# Patient Record
Sex: FEMALE | Race: WHITE | NOT HISPANIC OR LATINO | Employment: OTHER | URBAN - METROPOLITAN AREA
[De-identification: names, ages, dates, MRNs, and addresses within clinical notes are randomized per-mention and may not be internally consistent; named-entity substitution may affect disease eponyms.]

---

## 2017-01-19 ENCOUNTER — GENERIC CONVERSION - ENCOUNTER (OUTPATIENT)
Dept: OTHER | Facility: OTHER | Age: 82
End: 2017-01-19

## 2017-01-27 ENCOUNTER — GENERIC CONVERSION - ENCOUNTER (OUTPATIENT)
Dept: OTHER | Facility: OTHER | Age: 82
End: 2017-01-27

## 2017-01-27 ENCOUNTER — ALLSCRIPTS OFFICE VISIT (OUTPATIENT)
Dept: OTHER | Facility: OTHER | Age: 82
End: 2017-01-27

## 2017-04-11 ENCOUNTER — ALLSCRIPTS OFFICE VISIT (OUTPATIENT)
Dept: OTHER | Facility: OTHER | Age: 82
End: 2017-04-11

## 2017-04-11 DIAGNOSIS — R05.9 COUGH: ICD-10-CM

## 2017-06-28 ENCOUNTER — ALLSCRIPTS OFFICE VISIT (OUTPATIENT)
Dept: OTHER | Facility: OTHER | Age: 82
End: 2017-06-28

## 2017-06-28 ENCOUNTER — GENERIC CONVERSION - ENCOUNTER (OUTPATIENT)
Dept: OTHER | Facility: OTHER | Age: 82
End: 2017-06-28

## 2017-06-28 DIAGNOSIS — R07.9 CHEST PAIN: ICD-10-CM

## 2017-06-28 DIAGNOSIS — I35.0 NONRHEUMATIC AORTIC VALVE STENOSIS: ICD-10-CM

## 2017-06-28 DIAGNOSIS — R06.02 SHORTNESS OF BREATH: ICD-10-CM

## 2017-06-28 DIAGNOSIS — R53.83 OTHER FATIGUE: ICD-10-CM

## 2017-06-28 DIAGNOSIS — R06.09 OTHER FORMS OF DYSPNEA: ICD-10-CM

## 2017-06-28 DIAGNOSIS — R42 DIZZINESS AND GIDDINESS: ICD-10-CM

## 2017-06-28 DIAGNOSIS — R41.3 OTHER AMNESIA: ICD-10-CM

## 2017-07-24 ENCOUNTER — ALLSCRIPTS OFFICE VISIT (OUTPATIENT)
Dept: OTHER | Facility: OTHER | Age: 82
End: 2017-07-24

## 2017-07-26 ENCOUNTER — APPOINTMENT (OUTPATIENT)
Dept: LAB | Facility: OTHER | Age: 82
End: 2017-07-26
Payer: MEDICARE

## 2017-07-26 DIAGNOSIS — R06.09 OTHER FORMS OF DYSPNEA: ICD-10-CM

## 2017-07-26 DIAGNOSIS — I35.0 NONRHEUMATIC AORTIC VALVE STENOSIS: ICD-10-CM

## 2017-07-26 DIAGNOSIS — R07.9 CHEST PAIN: ICD-10-CM

## 2017-07-26 LAB
ALBUMIN SERPL BCP-MCNC: 3.7 G/DL (ref 3.5–5)
ALP SERPL-CCNC: 113 U/L (ref 46–116)
ALT SERPL W P-5'-P-CCNC: 19 U/L (ref 12–78)
ANION GAP SERPL CALCULATED.3IONS-SCNC: 6 MMOL/L (ref 4–13)
AST SERPL W P-5'-P-CCNC: 24 U/L (ref 5–45)
BASOPHILS # BLD AUTO: 0.03 THOUSANDS/ΜL (ref 0–0.1)
BASOPHILS NFR BLD AUTO: 0 % (ref 0–1)
BILIRUB SERPL-MCNC: 0.65 MG/DL (ref 0.2–1)
BUN SERPL-MCNC: 20 MG/DL (ref 5–25)
CALCIUM SERPL-MCNC: 9.3 MG/DL (ref 8.3–10.1)
CHLORIDE SERPL-SCNC: 102 MMOL/L (ref 100–108)
CO2 SERPL-SCNC: 30 MMOL/L (ref 21–32)
CREAT SERPL-MCNC: 0.94 MG/DL (ref 0.6–1.3)
EOSINOPHIL # BLD AUTO: 0.13 THOUSAND/ΜL (ref 0–0.61)
EOSINOPHIL NFR BLD AUTO: 2 % (ref 0–6)
ERYTHROCYTE [DISTWIDTH] IN BLOOD BY AUTOMATED COUNT: 14.2 % (ref 11.6–15.1)
GFR SERPL CREATININE-BSD FRML MDRD: 51 ML/MIN/1.73SQ M
GLUCOSE P FAST SERPL-MCNC: 100 MG/DL (ref 65–99)
HCT VFR BLD AUTO: 42.4 % (ref 34.8–46.1)
HGB BLD-MCNC: 13.9 G/DL (ref 11.5–15.4)
LYMPHOCYTES # BLD AUTO: 2.76 THOUSANDS/ΜL (ref 0.6–4.47)
LYMPHOCYTES NFR BLD AUTO: 37 % (ref 14–44)
MCH RBC QN AUTO: 29.7 PG (ref 26.8–34.3)
MCHC RBC AUTO-ENTMCNC: 32.8 G/DL (ref 31.4–37.4)
MCV RBC AUTO: 91 FL (ref 82–98)
MONOCYTES # BLD AUTO: 0.7 THOUSAND/ΜL (ref 0.17–1.22)
MONOCYTES NFR BLD AUTO: 9 % (ref 4–12)
NEUTROPHILS # BLD AUTO: 3.89 THOUSANDS/ΜL (ref 1.85–7.62)
NEUTS SEG NFR BLD AUTO: 52 % (ref 43–75)
NRBC BLD AUTO-RTO: 0 /100 WBCS
PLATELET # BLD AUTO: 197 THOUSANDS/UL (ref 149–390)
PMV BLD AUTO: 12.5 FL (ref 8.9–12.7)
POTASSIUM SERPL-SCNC: 3.8 MMOL/L (ref 3.5–5.3)
PROT SERPL-MCNC: 7.5 G/DL (ref 6.4–8.2)
RBC # BLD AUTO: 4.68 MILLION/UL (ref 3.81–5.12)
SODIUM SERPL-SCNC: 138 MMOL/L (ref 136–145)
WBC # BLD AUTO: 7.53 THOUSAND/UL (ref 4.31–10.16)

## 2017-07-26 PROCEDURE — 80053 COMPREHEN METABOLIC PANEL: CPT

## 2017-07-26 PROCEDURE — 36415 COLL VENOUS BLD VENIPUNCTURE: CPT

## 2017-07-26 PROCEDURE — 85025 COMPLETE CBC W/AUTO DIFF WBC: CPT

## 2017-08-03 ENCOUNTER — GENERIC CONVERSION - ENCOUNTER (OUTPATIENT)
Dept: OTHER | Facility: OTHER | Age: 82
End: 2017-08-03

## 2017-08-03 ENCOUNTER — HOSPITAL ENCOUNTER (OUTPATIENT)
Dept: NON INVASIVE DIAGNOSTICS | Facility: CLINIC | Age: 82
Discharge: HOME | End: 2017-08-03
Payer: MEDICARE

## 2017-08-03 DIAGNOSIS — I35.0 NONRHEUMATIC AORTIC VALVE STENOSIS: ICD-10-CM

## 2017-08-03 DIAGNOSIS — R06.09 OTHER FORMS OF DYSPNEA: ICD-10-CM

## 2017-08-03 PROCEDURE — 93306 TTE W/DOPPLER COMPLETE: CPT

## 2017-08-04 ENCOUNTER — HOSPITAL ENCOUNTER (OUTPATIENT)
Dept: NON INVASIVE DIAGNOSTICS | Facility: CLINIC | Age: 82
Discharge: HOME | End: 2017-08-04
Payer: MEDICARE

## 2017-08-04 DIAGNOSIS — I35.0 NONRHEUMATIC AORTIC VALVE STENOSIS: ICD-10-CM

## 2017-08-04 DIAGNOSIS — R07.9 CHEST PAIN: ICD-10-CM

## 2017-08-04 DIAGNOSIS — R06.02 SHORTNESS OF BREATH: ICD-10-CM

## 2017-08-04 PROCEDURE — 93017 CV STRESS TEST TRACING ONLY: CPT

## 2017-08-04 PROCEDURE — A9502 TC99M TETROFOSMIN: HCPCS

## 2017-08-04 PROCEDURE — 78452 HT MUSCLE IMAGE SPECT MULT: CPT

## 2017-08-04 RX ORDER — AMINOPHYLLINE DIHYDRATE 25 MG/ML
50 INJECTION, SOLUTION INTRAVENOUS ONCE
Status: COMPLETED | OUTPATIENT
Start: 2017-08-04 | End: 2017-08-04

## 2017-08-04 RX ADMIN — REGADENOSON 0.4 MG: 0.08 INJECTION, SOLUTION INTRAVENOUS at 13:38

## 2017-08-04 RX ADMIN — AMINOPHYLLINE 50 MG: 25 INJECTION, SOLUTION INTRAVENOUS at 14:01

## 2017-08-07 ENCOUNTER — ALLSCRIPTS OFFICE VISIT (OUTPATIENT)
Dept: OTHER | Facility: OTHER | Age: 82
End: 2017-08-07

## 2017-08-07 LAB
MAX DIASTOLIC BP: 84 MMHG
MAX HEART RATE: 71 BPM
MAX PREDICTED HEART RATE: 124 BPM
MAX. SYSTOLIC BP: 136 MMHG
PROTOCOL NAME: NORMAL
REASON FOR TERMINATION: NORMAL
TARGET HR FORMULA: NORMAL
TIME IN EXERCISE PHASE: 180 S

## 2017-08-21 ENCOUNTER — GENERIC CONVERSION - ENCOUNTER (OUTPATIENT)
Dept: OTHER | Facility: OTHER | Age: 82
End: 2017-08-21

## 2017-09-11 ENCOUNTER — ALLSCRIPTS OFFICE VISIT (OUTPATIENT)
Dept: OTHER | Facility: OTHER | Age: 82
End: 2017-09-11

## 2017-10-04 ENCOUNTER — GENERIC CONVERSION - ENCOUNTER (OUTPATIENT)
Dept: OTHER | Facility: OTHER | Age: 82
End: 2017-10-04

## 2017-10-11 ENCOUNTER — GENERIC CONVERSION - ENCOUNTER (OUTPATIENT)
Dept: OTHER | Facility: OTHER | Age: 82
End: 2017-10-11

## 2017-11-02 ENCOUNTER — GENERIC CONVERSION - ENCOUNTER (OUTPATIENT)
Dept: OTHER | Facility: OTHER | Age: 82
End: 2017-11-02

## 2017-11-06 ENCOUNTER — GENERIC CONVERSION - ENCOUNTER (OUTPATIENT)
Dept: OTHER | Facility: OTHER | Age: 82
End: 2017-11-06

## 2017-11-13 ENCOUNTER — GENERIC CONVERSION - ENCOUNTER (OUTPATIENT)
Dept: OTHER | Facility: OTHER | Age: 82
End: 2017-11-13

## 2017-12-04 ENCOUNTER — GENERIC CONVERSION - ENCOUNTER (OUTPATIENT)
Dept: FAMILY MEDICINE CLINIC | Facility: CLINIC | Age: 82
End: 2017-12-04

## 2017-12-04 ENCOUNTER — ALLSCRIPTS OFFICE VISIT (OUTPATIENT)
Dept: OTHER | Facility: OTHER | Age: 82
End: 2017-12-04

## 2017-12-05 NOTE — PROGRESS NOTES
Assessment  Assessed    1  Edema (782 3) (R60 9)   2  Aortic stenosis (424 1) (I35 0)   3  Hypertension (401 9) (I10)   4  Hypertensive heart disease (402 90) (I11 9)   5  Hyperlipidemia (272 4) (E78 5)   6  Diastolic dysfunction (228 5) (I51 9)   7  Non-rheumatic aortic regurgitation (424 1) (I35 1)   8  Non-rheumatic mitral regurgitation (424 0) (I34 0)   9  Non-rheumatic tricuspid valve insufficiency (424 2) (I36 1)    Discussion/Summary  Cardiology Discussion Summary Free Text Note Form [facility]:   2-D echocardiogram Aug 2017 - normal LVEF, mild LVH, grade 1 diastolic dysfunction, mild dilated patient LA, mild MAC, mild MR, mild AS, mild AR, mild TR, mild ND, PASP 35 mmHgnuclear stress Aug 2017 - normal   furosemide 40 mg and potassium chloride 20 mEq daily for now  Once swelling resolves, go prn  Low salt diet  Keep legs elevated while in bed  aggressive risk factor modification and therapeutic lifestyle changes  Low salt, low calorie, low fat, low cholesterol diet  studies were reviewed with the patient in detail  were reviewed and possible side effects discussed  concepts of atherosclerosis, including signs and symptoms of cardiac disease  measures were reviewed  were entertained and answered  was advised to report any problem(s) requiring medical attention  up with appropriate specialists and lab work as discussed  for follow up visit as scheduled or earlier if needed  recommendations will be forthcoming after the above testing is performed and results available  you for allowing me to participate in the care and evaluation of your patient  Should you have any questions, please feel free to contact me  Goals and Barriers: The patient has the current Goals: Edema resolution  The patent has the current Barriers: Old age  Patient's Capacity to Self-Care: Patient is unable to Self-Care: Family  Patient Education: Educational resources provided: Low salt diet      Medication SE Review and Pt Understands Tx: Possible side effects of new medications were reviewed with the patient/guardian today  The treatment plan was reviewed with the patient/guardian  The patient/guardian understands and agrees with the treatment plan    Counseling Documentation With Imm: The patient, patient's family was counseled regarding instructions for management,-- risk factor reductions,-- patient and family education,-- importance of compliance with treatment  Chief Complaint  Chief Complaint Chronic Condition [facility]: Patient is here today for follow up of chronic conditions described in HPI  History of Present Illness  Cardiology HPI Free Text Note [facility]: Patient states she had a bad episode of sciatica due to which she could not move much and was sitting most of the day  That made her lower extremity swelling get worse  No associated calf or chest pain  Now as the sciatica is getting better, she is moving more and so her LE swelling is better  She had to take her furosemide on a daily basis  Denies palpitations, lightheadedness, syncope, orthopnea, PND  HHD - not on any medications currently  - on diet control only  AR, MR, TR - stable      Review of Systems  Cardiology Female ROS:    Cardiac: as noted in HPI  Skin: No complaints of nonhealing sores or skin rash  Genitourinary: No complaints of recurrent urinary tract infections, frequent urination at night, difficult urination, blood in urine, kidney stones, loss of bladder control, kidney problems, denies any birth control or hormone replacement, is not post menopausal, not currently pregnant  Psychological: No complaints of feeling depressed, anxiety, panic attacks, or difficulty concentrating  General: No complaints of trouble sleeping, lack of energy, fatigue, appetite changes, weight changes, fever, frequent infections, or night sweats  Respiratory: as noted in HPI    HEENT: No complaints of serious problems, hearing problems, nose problems, throat problems, or snoring  Gastrointestinal: No complaints of liver problems, nausea, vomiting, heartburn, constipation, bloody stools, diarrhea, problems swallowing, adbominal pain, or rectal bleeding  Hematologic: No complaints of bleeding disorders, anemia, blood clots, or excessive brusing  Neurological: No complaints of numbness, tingling, dizziness, weakness, seizures, headaches, syncope or fainting, AM fatigue, daytime sleepiness, no witnessed apnea episodes  Musculoskeletal: No complaints of arthritis, back pain, or painfull swelling  ROS Reviewed:   ROS reviewed  Active Problems  Problems    1  Abdominal pain, RLQ (right lower quadrant) (789 03) (R10 31)   2  Aortic stenosis (424 1) (I35 0)   3  Arthritis (716 90) (M19 90)   4  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)   5  Back pain (724 5) (M54 9)   6  Changing skin lesion (709 9) (L98 9)   7  Chest pain (786 50) (R07 9)   8  Chronic low back pain (724 2,338 29) (M54 5,G89 29)   9  Cough (786 2) (R05)   10  CVA, old, hemiparesis (438 20) (I69 359)   11  Diastolic dysfunction (902 1) (I51 9)   12  Dizziness (780 4) (R42)   13  Dyspnea on exertion (786 09) (R06 09)   14  Edema (782 3) (R60 9)   15  Epigastric fullness (789 36) (R19 06)   16  Fatigue (780 79) (R53 83)   17  Headache (784 0) (R51)   18  Hyperlipidemia (272 4) (E78 5)   19  Hypertension (401 9) (I10)   20  Hypertensive heart disease (402 90) (I11 9)   21  Ischemic Stroke (434 91)   22  Lumbar facet arthropathy (721 3) (M46 96)   23  Lumbar spinal stenosis (724 02) (M48 061)   24  Memory difficulty (780 93) (R41 3)   25  Non-cardiac chest pain (786 59) (R07 89)   26  Non-rheumatic aortic regurgitation (424 1) (I35 1)   27  Non-rheumatic mitral regurgitation (424 0) (I34 0)   28  Non-rheumatic tricuspid valve insufficiency (424 2) (I36 1)   29  Screening for skin condition (V82 0) (Z13 89)   30  Seborrheic keratosis (702 19) (L82 1)   31  Shortness of breath (786 05) (R06 02)   32   Tinea pedis (110 4) (B35 3)   33  Tongue lesion (529 8) (K14 8)   34  Varicose veins of leg with edema, 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)   35  Vertigo (780 4) (R42)    Past Medical History  Problems    1  History of Acute otitis media, unspecified laterality   2  Arthritis (716 90) (M19 90)   3  Chronic low back pain (724 2,338 29) (M54 5,G89 29)   4  History of chest pain (V13 89) (Z87 898)   5  History of esophageal reflux (V12 79) (Z87 19)   6  History of tendinitis (V13 59) (Z87 39)   7  History of Paronychia of finger, unspecified laterality (681 02) (L03 019)   8  Varicose veins of leg with edema, unspecified laterality (454 8) (K88 221)  Active Problems And Past Medical History Reviewed: The active problems and past medical history were reviewed and updated today  Surgical History  Problems    1  History of Appendectomy   2  History of Cataract Surgery   3  History of Cholecystectomy  Surgical History Reviewed: The surgical history was reviewed and updated today  Family History  Mother    1  Family history of tuberculosis (V18 8) (Z80 3)  Father    2  Family history of myocardial infarction (V17 3) (Z82 49)  Son    3  Family history of hypercholesterolemia (V18 19) (Z83 42)   4  Family history of hypertension (V17 49) (Z82 49)  Brother    5  Family history of Colon cancer   6  Family history of hypertension (V17 49) (Z82 49)  Family History    7  No significant family history  Family History Reviewed: The family history was reviewed and updated today  Social History  Problems    · Denied: History of Alcohol   · Lives with adult children   · Denied: History of Marijuana   · Never a smoker   · Retired   ·   Social History Reviewed: The social history was reviewed and updated today  The social history was reviewed and is unchanged  Current Meds   1  ChoiceFul Multivitamin CAPS; TAKE 1 CAPSULE DAILY; Therapy: (Recorded:43Gtl7751) to Recorded   2   Furosemide 40 MG Oral Tablet; TAKE 1 TABLET DAILY; Therapy: 31ZLG5063 to (Evaluate:24Vku4772)  Requested for: 52Ebe4941; Last Rx:28Aug2017 Ordered   3  HM Vitamin B12 TABS; TAKE 1 TABLET DAILY; Therapy: (Recorded:09Sep2016) to Recorded   4  Ipratropium-Albuterol 0 5-2 5 (3) MG/3ML Inhalation Solution; use one half vial four times daily as needed; Therapy: 21Aug2017 to (Evaluate:20Oct2017)  Requested for: 20Jbg1051; Last Rx:21Aug2017 Ordered   5  Meloxicam 7 5 MG Oral Tablet; TAKE 1 TABLET DAILY; Therapy: 14HIO8796 to (Evaluate:09Mlc0402)  Requested for: 58FCC1654; Last Rx:04Oct2017 Ordered   6  Potassium Chloride ER 20 MEQ Oral Tablet Extended Release; Take 1 tablet daily; Therapy: 63HQH6911 to (Last Rx:51Klh3114)  Requested for: 28Aug2017 Ordered   7  Vitamin D3 5000 UNIT Oral Capsule; take 1 capsule daily; Therapy: (Recorded:09Sep2016) to Recorded  Medication List Reviewed: The medication list was reviewed and updated today  Allergies  Medication    1  Codeine Sulfate TABS  Denied    2  Sulfa Drugs    Vitals  Vital Signs    Recorded: 75WFO6039 11:09AM   Heart Rate 79   Systolic 827   Diastolic 82   Height 4 ft 7 in   Weight 132 lb    BMI Calculated 30 68   BSA Calculated 1 47   O2 Saturation 96       Physical Exam   Constitutional  General appearance: No acute distress, well appearing and well nourished  Eyes  Conjunctiva and Sclera examination: Conjunctiva pink, sclera anicteric  Ears, Nose, Mouth, and Throat - External inspection of ears and nose: Normal without deformities or discharge  -- Oropharynx: Clear, nares are clear, mucous membranes are moist   Neck  Neck and thyroid: Normal, supple, trachea midline, no thyromegaly  Pulmonary  Respiratory effort: No increased work of breathing or signs of respiratory distress  Auscultation of lungs: Clear to auscultation, no rales, no rhonchi, no wheezing, good air movement  Cardiovascular  Palpation of heart: Normal PMI, no thrills  Auscultation of heart: Abnormal  -- Grade 3/6 LEONOR at base  Carotid pulses: Normal, 2+ bilaterally  Examination of extremities for edema and/or varicosities: Abnormal  -- Bilat LE edema ++  Chest - Chest: Normal   Abdomen  Abdomen: Non-tender and no distention  Liver and spleen: No hepatomegaly or splenomegaly  Musculoskeletal Gait and station: Normal gait  -- Digits and nails: Normal without clubbing or cyanosis  Skin - Skin and subcutaneous tissue: Normal without rashes or lesions  Skin is warm and well perfused, normal turgor  Neurologic - Speech: Normal    Psychiatric - Orientation to person, place, and time: Normal -- Mood and affect: Normal       Future Appointments    Date/Time Provider Specialty Site   01/10/2018 12:00 PM DIONE Bray   Pulmonary Medicine [address] [place] [address]       Signatures   Electronically signed by : Claudetta Reichmann, M D ; Dec  4 2017 11:58AM EST                       (Author)

## 2018-01-10 ENCOUNTER — GENERIC CONVERSION - ENCOUNTER (OUTPATIENT)
Dept: OTHER | Facility: OTHER | Age: 83
End: 2018-01-10

## 2018-01-12 VITALS
OXYGEN SATURATION: 91 % | WEIGHT: 128 LBS | DIASTOLIC BLOOD PRESSURE: 90 MMHG | HEIGHT: 55 IN | BODY MASS INDEX: 29.62 KG/M2 | SYSTOLIC BLOOD PRESSURE: 140 MMHG | HEART RATE: 70 BPM

## 2018-01-13 VITALS
DIASTOLIC BLOOD PRESSURE: 80 MMHG | BODY MASS INDEX: 30.66 KG/M2 | HEIGHT: 55 IN | WEIGHT: 132.5 LBS | SYSTOLIC BLOOD PRESSURE: 130 MMHG | HEART RATE: 72 BPM | OXYGEN SATURATION: 95 %

## 2018-01-13 VITALS
HEIGHT: 55 IN | DIASTOLIC BLOOD PRESSURE: 82 MMHG | SYSTOLIC BLOOD PRESSURE: 126 MMHG | WEIGHT: 133 LBS | BODY MASS INDEX: 30.78 KG/M2 | TEMPERATURE: 97.1 F | OXYGEN SATURATION: 98 % | HEART RATE: 68 BPM

## 2018-01-13 VITALS
OXYGEN SATURATION: 95 % | WEIGHT: 130 LBS | SYSTOLIC BLOOD PRESSURE: 120 MMHG | DIASTOLIC BLOOD PRESSURE: 80 MMHG | HEIGHT: 55 IN | HEART RATE: 74 BPM | BODY MASS INDEX: 30.09 KG/M2

## 2018-01-14 VITALS
WEIGHT: 128 LBS | OXYGEN SATURATION: 95 % | HEIGHT: 55 IN | SYSTOLIC BLOOD PRESSURE: 134 MMHG | DIASTOLIC BLOOD PRESSURE: 84 MMHG | BODY MASS INDEX: 29.62 KG/M2 | HEART RATE: 60 BPM

## 2018-01-14 VITALS
HEART RATE: 71 BPM | BODY MASS INDEX: 30.12 KG/M2 | HEIGHT: 55 IN | OXYGEN SATURATION: 95 % | WEIGHT: 130.13 LBS | SYSTOLIC BLOOD PRESSURE: 134 MMHG | DIASTOLIC BLOOD PRESSURE: 88 MMHG | TEMPERATURE: 97.8 F

## 2018-01-14 NOTE — RESULT NOTES
Message   CBC and Metabolic panel normal   Urine has some abnormal findings, will await culture      Verified Results  (1) URINALYSIS w URINE C/S REFLEX (will reflex a microscopy if leukocytes, occult blood, or nitrites are not within normal limits) 04BYM2870 11:18AM Janneth Flores     Test Name Result Flag Reference   COLOR Yellow     CLARITY Clear     PH UA 6 0  4 5-8 0   LEUKOCYTE ESTERASE UA Moderate A Negative   NITRITE UA Negative  Negative   PROTEIN UA Negative mg/dl  Negative   GLUCOSE UA Negative mg/dl  Negative   KETONES UA Negative mg/dl  Negative   UROBILINOGEN UA 0 2 E U /dl  0 2, 1 0 E U /dl   BILIRUBIN UA Negative  Negative   BLOOD UA Negative  Negative   SPECIFIC GRAVITY UA 1 019  1 003-1 030     (1) URINALYSIS w URINE C/S REFLEX (will reflex a microscopy if leukocytes, occult blood, or nitrites are not within normal limits) 15RDC2034 11:18AM Conor Rajput     Test Name Result Flag Reference   BACTERIA None Seen /hpf  None Seen, Occasional   EPITHELIAL CELLS None Seen /hpf  None Seen, Occasional   RBC UA 2-4 /hpf A None Seen   WBC UA 10-20 /hpf A None Seen     (1) COMPREHENSIVE METABOLIC PANEL 31FMB7429 28:08YF Conor Rajput     Test Name Result Flag Reference   GLUCOSE,RANDM 86 mg/dL     If the patient is fasting, the ADA then defines impaired fasting glucose as > 100 mg/dL and diabetes as > or equal to 123 mg/dL     SODIUM 137 mmol/L  136-145   POTASSIUM 4 0 mmol/L  3 5-5 3   CHLORIDE 104 mmol/L  100-108   CARBON DIOXIDE 27 mmol/L  21-32   ANION GAP (CALC) 6 mmol/L  4-13   BLOOD UREA NITROGEN 17 mg/dL  5-25   CREATININE 0 89 mg/dL  0 60-1 30   Standardized to IDMS reference method   CALCIUM 9 8 mg/dL  8 3-10 1   BILI, TOTAL 0 44 mg/dL  0 20-1 00   ALK PHOSPHATAS 111 U/L     ALT (SGPT) 23 U/L  12-78   AST(SGOT) 24 U/L  5-45   ALBUMIN 3 5 g/dL  3 5-5 0   TOTAL PROTEIN 7 5 g/dL  6 4-8 2   eGFR Non-African American 58 9 ml/min/1 73sq [facility] Disease Education Program recommendations are as follows:  GFR calculation is accurate only with a steady state creatinine  Chronic Kidney disease less than 60 ml/min/1 73 sq  meters  Kidney failure less than 15 ml/min/1 73 sq  meters  (1) CBC/PLT/DIFF 03Kyh4121 11:56AM Daysi Underwood     Test Name Result Flag Reference   WBC COUNT 8 40 Thousand/uL  4 31-10 16   RBC COUNT 4 75 Million/uL  3 81-5 12   HEMOGLOBIN 14 3 g/dL  11 5-15 4   HEMATOCRIT 42 7 %  34 8-46  1   MCV 90 fL  82-98   MCH 30 1 pg  26 8-34 3   MCHC 33 5 g/dL  31 4-37 4   RDW 14 2 %  11 6-15 1   MPV 13 2 fL H 8 9-12 7   PLATELET COUNT 039 Thousands/uL  149-390   nRBC AUTOMATED 0 /100 WBCs     NEUTROPHILS RELATIVE PERCENT 61 %  43-75   LYMPHOCYTES RELATIVE PERCENT 29 %  14-44   MONOCYTES RELATIVE PERCENT 9 %  4-12   EOSINOPHILS RELATIVE PERCENT 1 %  0-6   BASOPHILS RELATIVE PERCENT 0 %  0-1   NEUTROPHILS ABSOLUTE COUNT 5 04 Thousands/?L  1 85-7 62   LYMPHOCYTES ABSOLUTE COUNT 2 44 Thousands/?L  0 60-4 47   MONOCYTES ABSOLUTE COUNT 0 76 Thousand/?L  0 17-1 22   EOSINOPHILS ABSOLUTE COUNT 0 11 Thousand/?L  0 00-0 61   BASOPHILS ABSOLUTE COUNT 0 03 Thousands/?L  0 00-0 10   - Patient Instructions: This bloodwork is non-fasting  Please drink two glasses of water morning of bloodwork  - Patient Instructions: This bloodwork is non-fasting  Please drink two glasses of water morning of bloodwork

## 2018-01-15 NOTE — MISCELLANEOUS
Message   Recorded as Task   Date: 09/01/2016 10:29 AM, Created By: Deirdre Ashford   Task Name: Call Patient with results   Assigned To: Erin Flores   Regarding Patient: Merlin Ion, Status: In Progress   PiaStanfordtravon Orf - 01 Sep 2016 10:29 AM     Patient Phone: [phone]      No acute findings on her CT scan  She has chronic ischemic disease which is common in the elderly  Are her symptoms getting any better ? Roberto Medina - 01 Sep 2016 10:40 AM     TASK REASSIGNED: Previously Assigned To Sil Limon - 01 Sep 2016 11:48 AM     TASK REPLIED TO: Previously Assigned To [facility],Team                      bernabeomtcFederica Whelan - 01 Sep 2016 11:48 AM     TASK IN PROGRESS   Jw Russell - 01 Sep 2016 12:02 PM     TASK REPLIED TO: Previously Assigned To [facility],Team  DAUGHTER IN LAW/CLEMENT NOTIFIED ~ HER SYMPTOMS ARE NOT IMPROVING  WHAT TO DO NOW THAT TEST  RESULTS ARE ALL IN  THEY WOULD LIKE IF Brad QUNIN [phone]   Erin Flores - 01 Sep 2016 1:00 PM     TASK EDITED  Pt did not answer   Priscilla Zarate - 02 Sep 2016 9:45 AM     TASK REPLIED TO: Previously Assigned To Erin Flores  please remember to call pancho/daughter in law today [phone] needs clarification on results   Spoke to Alyssa Cosby - discussed results of CT of brain and abdomen with her  CT showed chronic microvascular disease  Her abdominal CT showed attenuation of rectum? stool  Alyssa Cosby states she is still complaining of RLQ abdominal pain  She does have some constipation  She complains of a headache "her temples hurt"  I advised consultation with neurology and GI  Also advised her to start MiraLAX daily  Referrals will be mailed to patient        Plan  Abdominal pain, RLQ (right lower quadrant)    · 1 Real Mock Edwin MCCLOUD (Gastroenterology) Physician Referral  Consult  Status: Active   Requested for: 68SRH4519  Care Summary provided  : Yes  Headache · 1 Betsey Macdonald MD, Ines Menezes  (Neurology) Physician Referral  Consult  Status: Active   Requested for: 99JZS1958  Care Summary provided  : Yes    Signatures   Electronically signed by : Wai Schuler MD; Sep  2 2016  2:28PM EST                       (Author)

## 2018-01-15 NOTE — PROGRESS NOTES
Assessment    1  Viral URI (465 9) (J06 9)    Plan  Viral URI    · Call [phone] if: The cough is not gone in 10 days ; Status:Complete;   Done:  84NFM2116    Discussion/Summary    Viral URI - Supportive care  Discussed concerning symptoms i e  fever,lethargy, unable to tolerate PO for which she should go to ER  Possible side effects of new medications were reviewed with the patient/guardian today  The treatment plan was reviewed with the patient/guardian  The patient/guardian understands and agrees with the treatment plan      Chief Complaint  Patient is here for a cough  History of Present Illness  Coughing, congested  Symptoms started 4 days ago  No fever  She has not tried any treatment  Review of Systems    Constitutional: No fever, no chills, feels well, no tiredness, no recent weight gain or weight loss  ENT: nasal discharge  Cardiovascular: No complaints of slow heart rate, no fast heart rate, no chest pain, no palpitations, no leg claudication, no lower extremity edema  Respiratory: cough, but no shortness of breath and no wheezing  Preventive Quality 65 and Older: Falls Risk: The patient fell 0 times in the past 12 months  Urinary Incontinence Symptoms includes: no urinary incontinence   Glaucoma Screen: Date of last glaucoma screen was, per pt [facility]  Active Problems    1  Aortic stenosis (424 1) (I35 0)   2  Arthritis (716 90) (M19 90)   3  Back pain (724 5) (M54 9)   4  Changing skin lesion (709 9) (L98 9)   5  Chest pain (786 50) (R07 9)   6  Chronic low back pain (724 2,338 29) (M54 5,G89 29)   7  Dizziness (780 4) (R42)   8  Dyspnea on exertion (786 09) (R06 09)   9  Edema (782 3) (R60 9)   10  Epigastric fullness (789 36) (R19 06)   11  Hyperlipidemia (272 4) (E78 5)   12  Hypertension (401 9) (I10)   13  Hypertensive heart disease (402 90) (I11 9)   14  Ischemic Stroke (434 91)   15  Lumbar facet arthropathy (721 3) (M47 816)   16   Lumbar spinal stenosis (724 02) (M48 06)   17  Non-cardiac chest pain (786 59) (R07 89)   18  Screening for skin condition (V82 0) (Z13 89)   19  Seborrheic keratosis (702 19) (L82 1)   20  Tinea pedis (110 4) (B35 3)   21  Varicose veins of leg with edema, unspecified laterality (454 8) (K08 060)    Past Medical History    1  History of Acute otitis media, unspecified laterality   2  Arthritis (716 90) (M19 90)   3  Chronic low back pain (724 2,338 29) (M54 5,G89 29)   4  History of chest pain (V13 89) (Z87 898)   5  History of esophageal reflux (V12 79) (Z87 19)   6  History of tendinitis (V13 59) (Z87 39)   7  History of Paronychia of finger, unspecified laterality (681 02) (L03 019)   8  Varicose veins of leg with edema, unspecified laterality (454 8) (O13 366)    The active problems and past medical history were reviewed and updated today  Surgical History    1  History of Appendectomy    Family History    1  Family history of tuberculosis (V18 8) (Z83 1)    2  Family history of myocardial infarction (V17 3) (Z82 49)    3  Family history of Colon cancer   4  Family history of hypertension (V17 49) (Z82 49)    5  No significant family history    Social History    · Denied: History of Alcohol   · Lives with adult children   · Denied: History of Marijuana   · Never a smoker   · Retired   ·   The social history was reviewed and updated today  Current Meds   1  ChoiceFul Multivitamin CAPS Recorded   2  Furosemide 40 MG Oral Tablet; TAKE 1 TABLET DAILY; Therapy: 64TKF8565 to (Nicholes Learn)  Requested for: 55EUI9184; Last   Rx:05Jun2015 Ordered   3  HM Vitamin B12 TABS Recorded   4  Potassium Chloride ER 20 MEQ Oral Tablet Extended Release; Take 1 tablet daily; Therapy: 67XJU8774 to (Evaluate:69Hyr1038)  Requested for: 83MKK2782; Last   Rx:05Jun2015 Ordered   5  Terbinafine HCl - 1 % External Cream; APPLY 2-3 TIMES DAILY TO AFFECTED AREA(S); Therapy: 86DBU9734 to (Last Rx:07Jan2016) Ordered   6   Vitamin D3 5000 UNIT Oral Capsule Recorded    The medication list was reviewed and updated today  Allergies    1  Codeine Sulfate TABS   2  Sulfa Drugs    Vitals  Vital Signs [Data Includes: Current Encounter]    Recorded: 06DUM2046 01:13PM   Temperature 99 1 F   Heart Rate 82   Systolic 360   Diastolic 80   Height 4 ft 10 in   Weight 132 lb 4 00 oz   BMI Calculated 27 64   BSA Calculated 1 52   O2 Saturation 93     Physical Exam    Constitutional   General appearance: No acute distress, well appearing and well nourished  Eyes   Conjunctiva and lids: No swelling, erythema or discharge  Pupils and irises: Equal, round and reactive to light  Ears, Nose, Mouth, and Throat   Oropharynx: Normal with no erythema, edema, exudate or lesions  Pulmonary   Respiratory effort: No increased work of breathing or signs of respiratory distress  Auscultation of lungs: Clear to auscultation  Cardiovascular   Auscultation of heart: Normal rate and rhythm, normal S1 and S2, without murmurs           Results/Data  Encounter Results   *VB - Fall Risk Assessment  (Dx V80 09 Screen for Neurologic Disorder) 64WGS2639 01:19PM Rollene Rye     Test Name Result Flag Reference   Fall Risk Assessment 00CTY4543       *VB-Urinary Incontinence Screen (Dx V81 6 Screen for UI) 67XKR1162 01:18PM Rollene Rye     Test Name Result Flag Reference   Urinary Incontinence Assessment 10HAB2172         Signatures   Electronically signed by : Mir Keller MD; Jan 22 2016  1:29PM EST                       (Author)

## 2018-01-15 NOTE — RESULT NOTES
Message   HEr abdominal CT scan was negative  I was unable to reach them earlier to discuss her ongoing symptoms     Verified Results  * CT ABDOMEN PELVIS W CONTRAST [id number] 01:08PM Oscar San Order Number: [id number]    - Patient Instructions: To schedule this appointment, please contact Central Scheduling at [phone]  Test Name Result Flag Reference   CT ABDOMEN PELVIS W CONTRAST (Report)     CT ABDOMEN AND PELVIS WITH IV CONTRAST     INDICATION: Right lower quadrant pain  COMPARISON: None  TECHNIQUE: CT examination of the abdomen and pelvis was performed after the administration of intravenous contrast  This examination, like all CT scans performed in the [place], was performed utilizing techniques to minimize    radiation dose exposure, including the use of iterative reconstruction and automated exposure control  Axial, sagittal, and coronal reformatted images were submitted for interpretation  100 cc of intravenous Omnipaque 350 was administered for this    examination  Enteric contrast was given  Delayed images of the abdomen were also obtained  FINDINGS:     ABDOMEN     LOWER CHEST:    Mild dependent hypoventilatory changes  There is a large hiatal hernia  Coronary artery calcifications are noted  LIVER/BILIARY TREE: Unremarkable  GALLBLADDER: Absent  SPLEEN: Unremarkable  PANCREAS: Atrophic without acute findings  ADRENAL GLANDS: Unremarkable  KIDNEYS/URETERS: 11 mm calcification in the lateral right upper pole, possibly calculus within a calyceal diverticulum  No hydronephrosis  2 5 cm anterior left renal cyst and subcentimeter right renal hypodensity too small to characterize     STOMACH AND BOWEL:    The small bowel is normal caliber  No focal inflammatory changes or fluid collections are identified  Slight asymmetric prominence of the left posterior rectal wall may be accentuated by stool     Scattered colonic diverticulosis most prevalent in the descending and sigmoid regions without evidence of focal diverticulitis  APPENDIX: No findings to suggest appendicitis  ABDOMINOPELVIC CAVITY:    No free intraperitoneal air  No lymphadenopathy  There is a small amount of low-density fluid seen deep to the anterior ventral abdominal wall series 201/52  This is of uncertain although doubtful clinical significance  No adjacent inflammatory changes are seen  VESSELS: Unremarkable for patient's age  PELVIS     REPRODUCTIVE ORGANS: Status post hysterectomy  The ovaries are believed to be visualized  URINARY BLADDER: Unremarkable  ABDOMINAL WALL/INGUINAL REGIONS: Unremarkable  OSSEOUS STRUCTURES: No acute fracture or destructive osseous lesion  Scoliosis and multilevel degenerative changes of the spine  Mild loss of axial height of lower thoracic vertebrae appears chronic  IMPRESSION:     No acute intra-abdominal abnormality  Slight asymmetric prominence of the left posterior rectal wall may be accentuated by stool  Correlate with physical examination  Small amount of low-density fluid seen deep to the anterior ventral abdominal wall of uncertain although doubtful clinical significance given absence of any adjacent adjacent changes  Scattered colonic diverticulosis  Additional incidental findings as above         Workstation performed: [id number]     Signed by:   Abdirahman Mcadams DO   9/1/16   100

## 2018-01-16 NOTE — RESULT NOTES
Verified Results  ECHO COMPLETE WITH CONTRAST IF INDICATED [id number] 09:48AM Addy Kellert Order Number: [id number]    - Patient Instructions: To schedule this appointment, please contact Central Scheduling at [phone]  Test Name Result Flag Reference   ECHO COMPLETE WITH CONTRAST IF INDICATED (Report)     [address] [facility], [facility]   [phone]     Transthoracic Echocardiogram   2D, M-mode, and Color Doppler     Study date: 03-Aug-2017     Patient: Thaddeus Summers   MR number: [id number]   Account number: [de-identified]   : 1921   Age: 80 years   Gender: Female   Status: Outpatient   Location: [facility]   Height: 55 in   Weight: 128 lb   BP: 140/ 90 mmHg     Indications: Dyspnea  Diagnoses: R06 09 - Other forms of dyspnea     Sonographer: Rubi RCS   Interpreting Physician: Raquel Garcia MD   Primary Physician: Lars Olson   Referring Physician: Raquel Garcia MD   Group: [facility]     SUMMARY     LEFT VENTRICLE:   Systolic function was normal  Ejection fraction was estimated to be 60 %  There were no regional wall motion abnormalities  Wall thickness was mildly increased  There was mild concentric hypertrophy  Doppler parameters were consistent with abnormal left ventricular relaxation (grade 1 diastolic dysfunction)  RIGHT VENTRICLE:   The size was normal    Systolic function was normal      LEFT ATRIUM:   The atrium was dilated  MITRAL VALVE:   There was mild annular calcification  There was mild regurgitation  AORTIC VALVE:   The valve was trileaflet  Leaflets exhibited increased thickness and calcification with reduced cuspal separation  There was mild stenosis  Peak and mean AV gradients were 24 and 12 mm Hg respectively  Findings similar to echo dated 2014 (22 and 11 mm Hg respectively)  There was mild regurgitation       TRICUSPID VALVE: There was mild regurgitation  Estimated peak PA pressure was at least 35 mmHg  PULMONIC VALVE:   There was mild regurgitation  HISTORY: PRIOR HISTORY: Aortic stenosis  Hyperlipidemia  Hypertension  PROCEDURE: The study was performed in the [address] [place] [address]  This was a routine study  The transthoracic approach was used  The study included complete 2D imaging, M-mode, and color Doppler  The heart rate was 65 bpm, at the   start of the study  Images were obtained from the parasternal, apical, subcostal, and suprasternal notch acoustic windows  Image quality was adequate  LEFT VENTRICLE: Size was normal  Systolic function was normal  Ejection fraction was estimated to be 60 %  There were no regional wall motion abnormalities  Wall thickness was mildly increased  There was mild concentric hypertrophy  No   evidence of apical thrombus  DOPPLER: Doppler parameters were consistent with abnormal left ventricular relaxation (grade 1 diastolic dysfunction)  RIGHT VENTRICLE: The size was normal  Systolic function was normal  Wall thickness was normal      LEFT ATRIUM: The atrium was dilated  RIGHT ATRIUM: Size was normal      MITRAL VALVE: There was mild annular calcification  There was normal leaflet separation  DOPPLER: The transmitral velocity was within the normal range  There was no evidence for stenosis  There was mild regurgitation  AORTIC VALVE: The valve was trileaflet  Leaflets exhibited increased thickness and calcification with reduced cuspal separation  DOPPLER: There was mild stenosis  Peak and mean AV gradients were 24 and 12 mm Hg respectively  Findings   similar to echo dated July 2014 (22 and 11 mm Hg respectively)  There was mild regurgitation  TRICUSPID VALVE: The valve structure was normal  There was normal leaflet separation  DOPPLER: The transtricuspid velocity was within the normal range  There was no evidence for stenosis  There was mild regurgitation  Estimated peak PA   pressure was at least 35 mmHg  PULMONIC VALVE: Leaflets exhibited normal thickness, no calcification, and normal cuspal separation  DOPPLER: The transpulmonic velocity was within the normal range  There was mild regurgitation  PERICARDIUM: There was no pericardial effusion  The pericardium was normal in appearance  AORTA: The root exhibited normal size  SYSTEMIC VEINS: IVC: The inferior vena cava was not well visualized  SYSTEM MEASUREMENT TABLES     Apical four chamber   4 chamber Left Atrium Volume Index; Planimetry; End Systole; Apical four chamber;: 22 71 cm2   Left Ventricular Diastolic Area; Method of Disks, Single Plane; End Diastole; Apical four chamber;: 21 85 cm2   Left Ventricular systolic Area; Method of Disks, Single Plane; End Systole; Apical four chamber;: 15 91 cm2   Right Atrium Systolic Area; Planimetry; End Systole; Apical four chamber;: 9 53 cm2   Right Ventricular Internal Diastolic Dimension; End Diastole; Apical four chamber;: 31 2 mm   TAPSE: 15 8 mm     Unspecified Scan Mode   AR Vmax; Regurgitant Flow; Diastole;: 463 2 cm/s   Aortic Root Diameter; End Systole;: 26 8 mm   Aortic valve Area; Continuity Equation by Velocity Time Integral; Systole;: 1 27 cm2   Cardiovascular Orifice Diameter; End Systole;: 17 8 mm   Gradient Pressure, Peak; Simplified Bernoulli; Antegrade Flow; Systole;: 23 6 mm[Hg]   Gradient pressure, average; Simplified Bernoulli; Antegrade Flow; Systole;: 12 mm[Hg]   Left Atrium to Aortic Root Ratio;: 1 43   Left atrial diameter; End Diastole;: 38 3 mm   Pressure Half Time;: 0 48 s   Cardiac Output; Teichholz; Systole;: 1 93 L/min   Heart rate; Teichholz;: 77 {H  B }/min   Interventricular Septum Diastolic Thickness; Teichholz; End Diastole;: 14 4 mm   Left Ventricle Internal End Diastolic Dimension; Teichholz;: 35 9 mm   Left Ventricle Internal Systolic Dimension; Teichholz; End Systole;: 26 1 mm   Left Ventricle Mass;  Mass AVCube with Teichholz; End Diastole;: 134 g   Left Ventricle Posterior Wall Diastolic Thickness; Teichholz; End Diastole;: 8 8 mm   Left Ventricular Ejection Fraction; Teichholz;: 54 2 %   Left Ventricular End Diastolic Volume; Teichholz;: 54 1 ml   Left Ventricular End Systolic Volume; Teichholz;: 24 8 ml   Left Ventricular Fractional Shortening;: 27 3 %   Stroke volume;  Teichholz; Systole;: 29 3 ml   Mitral Valve Area; Area by Pressure Half-Time; Systole;: 2 89 cm2   Mitral Valve E to A Ratio; Systole;: 0 93   Pressure half time; Diastole;: 0 08 s   Maximum Tricuspid valve regurgitation pressure gradient; Regurgitant Flow; Systole;: 31 7 mm[Hg]     [facility] Accredited [facility]     Prepared and electronically signed by     Lisa Brady MD   Signed 03-Aug-2017 14:58:46

## 2018-01-17 NOTE — RESULT NOTES
Message   her urine culture is negative      Verified Results  (1) URINALYSIS w URINE C/S REFLEX (will reflex a microscopy if leukocytes, occult blood, or nitrites are not within normal limits) [id number] 11:18AM Taylor Wooten     Test Name Result Flag Reference   CLINICAL REPORT (Report)     Test:        Urine culture  Specimen Source:  Urine, Clean Catch  Specimen Type:   Urine  Specimen Date:   8/24/2016 11:18 AM  Result Date:    8/25/2016 6:06 PM  Result Status:   Final result  Resulting Lab:   [facility] [address] [place] [id number]            Tel: [phone]      CULTURE                                       ------------------                                   <10,000 cfu/ml Mixed Contaminants X2

## 2018-01-18 NOTE — RESULT NOTES
Message   No acute findings on her CT scan  She has chronic ischemic disease which is common in the elderly  Are her symptoms getting any better ? Verified Results  * CT HEAD WO CONTRAST [id number] 01:08PM Candie Morse Order Number: [id number]    - Patient Instructions: To schedule this appointment, please contact Central Scheduling at [phone]  Test Name Result Flag Reference   CT HEAD WO CONTRAST (Report)     CT BRAIN - WITHOUT CONTRAST     INDICATION: 22-year-old female, dizziness, giddiness, CVA      COMPARISON: None     TECHNIQUE: Multiple contiguous axial CT images were obtained at 2 5 mm intervals through the posterior fossa followed by 5 mm intervals through the remainder of the brain  Examination was performed utilizing techniques to minimize radiation dose,    including the use of dose reduction software  IMAGE QUALITY: Diagnostic     FINDINGS:      PARENCHYMA:    Severe chronic microvascular ischemic changes are present within the white matter of the frontal and parietal lobes bilaterally  These findings are most pronounced in the right hemisphere  Age-appropriate cerebral atrophy is present        No acute intracranial hemorrhage or mass effect     Dense bilateral globus pallidus calcifications     VENTRICLES AND EXTRA-AXIAL SPACES:  Normal      VISUALIZED ORBITS AND PARANASAL SINUSES: Normal      CALVARIUM AND EXTRACRANIAL SOFT TISSUES: Normal        IMPRESSION:   Severe chronic microvascular ischemic disease     Age-related atrophy     No acute hemorrhage or mass effect       Workstation performed: [id number]     Signed by:   Seth Brown MD   9/1/16   100

## 2018-01-22 VITALS
BODY MASS INDEX: 30.14 KG/M2 | HEART RATE: 67 BPM | DIASTOLIC BLOOD PRESSURE: 84 MMHG | SYSTOLIC BLOOD PRESSURE: 124 MMHG | WEIGHT: 130.25 LBS | HEIGHT: 55 IN | OXYGEN SATURATION: 93 %

## 2018-01-22 VITALS
DIASTOLIC BLOOD PRESSURE: 80 MMHG | HEIGHT: 55 IN | OXYGEN SATURATION: 92 % | WEIGHT: 129.25 LBS | TEMPERATURE: 98.5 F | BODY MASS INDEX: 29.91 KG/M2 | HEART RATE: 76 BPM | SYSTOLIC BLOOD PRESSURE: 146 MMHG

## 2018-01-22 VITALS
DIASTOLIC BLOOD PRESSURE: 84 MMHG | SYSTOLIC BLOOD PRESSURE: 140 MMHG | HEART RATE: 68 BPM | OXYGEN SATURATION: 93 % | BODY MASS INDEX: 30.37 KG/M2 | HEIGHT: 55 IN | WEIGHT: 131.25 LBS

## 2018-01-23 VITALS
SYSTOLIC BLOOD PRESSURE: 140 MMHG | DIASTOLIC BLOOD PRESSURE: 82 MMHG | OXYGEN SATURATION: 96 % | WEIGHT: 132 LBS | BODY MASS INDEX: 30.55 KG/M2 | HEIGHT: 55 IN | HEART RATE: 79 BPM

## 2018-01-24 VITALS
OXYGEN SATURATION: 96 % | SYSTOLIC BLOOD PRESSURE: 126 MMHG | WEIGHT: 134.5 LBS | DIASTOLIC BLOOD PRESSURE: 80 MMHG | BODY MASS INDEX: 31.13 KG/M2 | HEART RATE: 73 BPM | HEIGHT: 55 IN

## 2018-05-23 RX ORDER — FUROSEMIDE 40 MG/1
1 TABLET ORAL DAILY
COMMUNITY
Start: 2014-08-11 | End: 2018-06-05 | Stop reason: SDUPTHER

## 2018-05-23 RX ORDER — POTASSIUM CHLORIDE 1500 MG/1
1 TABLET, FILM COATED, EXTENDED RELEASE ORAL DAILY
COMMUNITY
Start: 2014-08-11 | End: 2018-08-28 | Stop reason: SDUPTHER

## 2018-05-23 RX ORDER — MELOXICAM 7.5 MG/1
1 TABLET ORAL DAILY
COMMUNITY
Start: 2017-10-04 | End: 2018-06-05

## 2018-05-23 RX ORDER — IPRATROPIUM BROMIDE AND ALBUTEROL SULFATE 2.5; .5 MG/3ML; MG/3ML
SOLUTION RESPIRATORY (INHALATION)
COMMUNITY
Start: 2017-08-21 | End: 2018-08-19

## 2018-05-23 RX ORDER — MAG HYDROX/ALUMINUM HYD/SIMETH 400-400-40
1 SUSPENSION, ORAL (FINAL DOSE FORM) ORAL DAILY
COMMUNITY
End: 2018-08-19

## 2018-06-05 ENCOUNTER — APPOINTMENT (OUTPATIENT)
Dept: LAB | Facility: CLINIC | Age: 83
End: 2018-06-05
Payer: MEDICARE

## 2018-06-05 ENCOUNTER — OFFICE VISIT (OUTPATIENT)
Dept: CARDIOLOGY CLINIC | Facility: CLINIC | Age: 83
End: 2018-06-05
Payer: MEDICARE

## 2018-06-05 VITALS
SYSTOLIC BLOOD PRESSURE: 140 MMHG | HEART RATE: 75 BPM | HEIGHT: 58 IN | WEIGHT: 123 LBS | DIASTOLIC BLOOD PRESSURE: 82 MMHG | BODY MASS INDEX: 25.82 KG/M2 | OXYGEN SATURATION: 96 %

## 2018-06-05 DIAGNOSIS — I36.1 NONRHEUMATIC TRICUSPID VALVE REGURGITATION: ICD-10-CM

## 2018-06-05 DIAGNOSIS — R60.0 BILATERAL LEG EDEMA: ICD-10-CM

## 2018-06-05 DIAGNOSIS — I10 ESSENTIAL HYPERTENSION: ICD-10-CM

## 2018-06-05 DIAGNOSIS — I51.89 DIASTOLIC DYSFUNCTION: ICD-10-CM

## 2018-06-05 DIAGNOSIS — I35.1 NONRHEUMATIC AORTIC VALVE INSUFFICIENCY: ICD-10-CM

## 2018-06-05 DIAGNOSIS — R06.02 SHORTNESS OF BREATH ON EXERTION: Primary | ICD-10-CM

## 2018-06-05 DIAGNOSIS — I11.9 HYPERTENSIVE HEART DISEASE WITHOUT HEART FAILURE: ICD-10-CM

## 2018-06-05 DIAGNOSIS — I34.0 NONRHEUMATIC MITRAL VALVE REGURGITATION: ICD-10-CM

## 2018-06-05 DIAGNOSIS — E78.2 MIXED HYPERLIPIDEMIA: ICD-10-CM

## 2018-06-05 DIAGNOSIS — I35.0 NON-RHEUMATIC AORTIC STENOSIS: ICD-10-CM

## 2018-06-05 LAB — NT-PROBNP SERPL-MCNC: 745 PG/ML

## 2018-06-05 PROCEDURE — 99215 OFFICE O/P EST HI 40 MIN: CPT | Performed by: INTERNAL MEDICINE

## 2018-06-05 PROCEDURE — 83880 ASSAY OF NATRIURETIC PEPTIDE: CPT

## 2018-06-05 PROCEDURE — 36415 COLL VENOUS BLD VENIPUNCTURE: CPT

## 2018-06-05 RX ORDER — METOPROLOL SUCCINATE 25 MG/1
25 TABLET, EXTENDED RELEASE ORAL DAILY
Qty: 30 TABLET | Refills: 3 | Status: SHIPPED | OUTPATIENT
Start: 2018-06-05 | End: 2018-08-19

## 2018-06-05 RX ORDER — FUROSEMIDE 40 MG/1
60 TABLET ORAL DAILY
Qty: 135 TABLET | Refills: 1
Start: 2018-06-05 | End: 2018-08-22 | Stop reason: HOSPADM

## 2018-06-05 NOTE — PROGRESS NOTES
CARDIOLOGY OFFICE VISIT  [facility]  [address], [place], [facility] Julisa Mcmillan  Tel: [phone]      NAME: Dima Shafer  AGE: 80 y o  SEX: female  : 1921   MRN: [id number]      Chief Complaint:  Chief Complaint   Patient presents with    Follow-up     AS, SANDRO, JOSE ANTONIO         History of Present Illness:   Patient comes for follow up with her daughter  States she easily gets SOB, even with minimal walking in the house  Denies associated chest pain / pressure, palpitations, lightheadedness, syncope  Also denies orthopnea, PND, claudication  Does have constant LE swelling  Is being treated for asthma with COPD by her pulmonologist    HTN, SANDRO, DD -  Has been hypertensive for many years  Taking medications regularly  Denies lightheadedness, headache, medication side effects  HLP -  Has had hyperlipidemia for many years  Taking statin regularly along with diet control  Denies myalgia  PCP closely monitoring the blood work  AS, AR, MR, TR - stable, mild but together might be contributing to her SOB      Past Medical History:  Past Medical History:   Diagnosis Date    Arthritis     Chest pain     Chronic lower back pain     Esophageal reflux     Varicose veins of leg with edema, unspecified laterality          Past Surgical History:  Past Surgical History:   Procedure Laterality Date    APPENDECTOMY      CATARACT EXTRACTION      CHOLECYSTECTOMY           Family History:  Family History   Problem Relation Age of Onset    Tuberculosis Mother     Heart attack Father     Hypertension Son     Hyperlipidemia Son     Hypertension Brother     Colon cancer Brother          Social History:  Social History     Social History    Marital status:       Spouse name: N/A    Number of children: N/A    Years of education: N/A     Social History Main Topics    Smoking status: Never Smoker    Smokeless tobacco: Not on file  Alcohol use No    Drug use: Unknown    Sexual activity: Not on file     Other Topics Concern    Not on file     Social History Narrative    No narrative on file         The following portions of the patient's history were reviewed and updated as appropriate: past medical history, past surgical history, past family history,  past social history, current medications, allergies list       Review of Systems:  Constitutional: Denies fever, chills  Eyes: Denies eye redness, eye discharge, double vision  ENT: Denies tinnitus, sneezing, nasal discharge, sore throat   Respiratory: Denies cough, expectoration, hemoptysis  +shortness of breath  Cardiovascular: Denies chest pain, palpitations, orthopnea, PND  +lower extremity swelling  Gastrointestinal: Denies abdominal pain, nausea, vomiting, hematemesis, diarrhea, bloody stools  Genito-Urinary: Denies dysuria, incontinence  Neurologic: Denies lightheadedness, syncope, headache, focal weakness, seizures  Endocrine: Denies polyuria, polydipsia, temperature intolerance  Allergy and Immunology: Denies hives, insect bite sensitivity  Hematological and Lymphatic: Denies bleeding problems, swollen glands   Psychological: Denies depression, suicidal ideation, anxiety, panic  Dermatological: Denies pruritus, rash, skin lesion changes      Vitals:  Vitals:    06/05/18 1032   BP: 140/82   Pulse: 75   SpO2: 96%       Body mass index is 25 71 kg/m²  Weight (last 2 days)     Date/Time   Weight    06/05/18 1032  55 8 (123)                Physical Examination:  General: Short elderly female  Using a cane for support  Awake, alert, oriented in time, place and person  Responding to commands  Head: Normocephalic  Atraumatic  Eyes: Nonicteric  ENT: Normal external ear canals  Nares normal, no drainage  Lips and oral mucosa normal  Neck: Supple  JVP not raised   Trachea central  No thyromegaly  Lungs: Bilateral bronchovascular breath sounds with no crackles or rhonchi  Chest wall: No tenderness  Cardiovascular: RRR  S1 and S2 normal  Grade 3/6 LEONOR at base  No rub or gallop  Gastrointestinal: Abdomen soft, nontender  No guarding or rigidity  Liver and spleen not palpable  Bowel sounds present  Neurologic: Patient is awake, alert, oriented in time, place and person  Responding to command  Moving all extremities  Integumentary:  No skin rash  Lymphatic: No cervical lymphadenopathy  Back: Symmetric  No CVA tenderness  Extremities: No clubbing, cyanosis  B/L LE edema+      Laboratory Results:  CBC with diff:   Lab Results   Component Value Date    WBC 7 53 2017    RBC 4 68 2017    HGB 13 9 2017    HCT 42 4 2017    MCV 91 2017    MCH 29 7 2017    RDW 14 2 2017     2017       CMP:  Lab Results   Component Value Date    CREATININE 0 94 2017    BUN 20 2017     2017    K 3 8 2017     2017    CO2 30 2017    GLUCOSE 86 2016    PROT 7 5 2017    ALKPHOS 113 2017    ALT 19 2017    AST 24 2017       Cardiac testing:   Results for orders placed during the hospital encounter of 17   Echo complete with contrast if indicated    Narrative [place] [address] [address] [place] [address]  [phone]    Transthoracic Echocardiogram  2D, M-mode, and Color Doppler    Study date:  03-Aug-2017    Patient: Kim Laughlin  MR number: [id number]  Account number: [de-identified]  : 1921  Age: 80 years  Gender: Female  Status: Outpatient  Location: [facility]  Height: 55 in  Weight: 128 lb  BP: 140/ 90 mmHg    Indications: Dyspnea      Diagnoses: R06 09 - Other forms of dyspnea    Sonographer:  Pam Leggett, RCS  Interpreting Physician:  Mirian Zelaya MD  Primary Physician:  Darylene Arn  Referring Physician:  Mirian Zelaya MD  Group:  [facility]    SUMMARY    LEFT VENTRICLE:  Systolic function was normal  Ejection fraction was estimated to be 60 %  There were no regional wall motion abnormalities  Wall thickness was mildly increased  There was mild concentric hypertrophy  Doppler parameters were consistent with abnormal left ventricular relaxation (grade 1 diastolic dysfunction)  RIGHT VENTRICLE:  The size was normal   Systolic function was normal     LEFT ATRIUM:  The atrium was dilated  MITRAL VALVE:  There was mild annular calcification  There was mild regurgitation  AORTIC VALVE:  The valve was trileaflet  Leaflets exhibited increased thickness and calcification with reduced cuspal separation  There was mild stenosis  Peak and mean AV gradients were 24 and 12 mm Hg respectively  Findings similar to echo dated July 2014 (22 and 11 mm Hg respectively)  There was mild regurgitation  TRICUSPID VALVE:  There was mild regurgitation  Estimated peak PA pressure was at least 35 mmHg  PULMONIC VALVE:  There was mild regurgitation  HISTORY: PRIOR HISTORY: Aortic stenosis  Hyperlipidemia  Hypertension  PROCEDURE: The study was performed in the [address] [place] [address]  This was a routine study  The transthoracic approach was used  The study included complete 2D imaging, M-mode, and color Doppler  The heart rate was 65 bpm, at the  start of the study  Images were obtained from the parasternal, apical, subcostal, and suprasternal notch acoustic windows  Image quality was adequate  LEFT VENTRICLE: Size was normal  Systolic function was normal  Ejection fraction was estimated to be 60 %  There were no regional wall motion abnormalities  Wall thickness was mildly increased  There was mild concentric hypertrophy  No  evidence of apical thrombus  DOPPLER: Doppler parameters were consistent with abnormal left ventricular relaxation (grade 1 diastolic dysfunction)      RIGHT VENTRICLE: The size was normal  Systolic function was normal  Wall thickness was normal     LEFT ATRIUM: The atrium was dilated  RIGHT ATRIUM: Size was normal     MITRAL VALVE: There was mild annular calcification  There was normal leaflet separation  DOPPLER: The transmitral velocity was within the normal range  There was no evidence for stenosis  There was mild regurgitation  AORTIC VALVE: The valve was trileaflet  Leaflets exhibited increased thickness and calcification with reduced cuspal separation  DOPPLER: There was mild stenosis  Peak and mean AV gradients were 24 and 12 mm Hg respectively  Findings  similar to echo dated July 2014 (22 and 11 mm Hg respectively)  There was mild regurgitation  TRICUSPID VALVE: The valve structure was normal  There was normal leaflet separation  DOPPLER: The transtricuspid velocity was within the normal range  There was no evidence for stenosis  There was mild regurgitation  Estimated peak PA  pressure was at least 35 mmHg  PULMONIC VALVE: Leaflets exhibited normal thickness, no calcification, and normal cuspal separation  DOPPLER: The transpulmonic velocity was within the normal range  There was mild regurgitation  PERICARDIUM: There was no pericardial effusion  The pericardium was normal in appearance  AORTA: The root exhibited normal size  SYSTEMIC VEINS: IVC: The inferior vena cava was not well visualized  SYSTEM MEASUREMENT TABLES    Apical four chamber  4 chamber Left Atrium Volume Index; Planimetry; End Systole; Apical four chamber;: 22 71 cm2  Left Ventricular Diastolic Area; Method of Disks, Single Plane; End Diastole; Apical four chamber;: 21 85 cm2  Left Ventricular systolic Area; Method of Disks, Single Plane; End Systole; Apical four chamber;: 15 91 cm2  Right Atrium Systolic Area; Planimetry; End Systole; Apical four chamber;: 9 53 cm2  Right Ventricular Internal Diastolic Dimension; End Diastole; Apical four chamber;: 31 2 mm  TAPSE: 15 8 mm    Unspecified Scan Mode  AR Vmax; Regurgitant Flow; Diastole;: 463 2 cm/s  Aortic Root Diameter;  End Systole;: 26 8 mm  Aortic valve Area; Continuity Equation by Velocity Time Integral; Systole;: 1 27 cm2  Cardiovascular Orifice Diameter; End Systole;: 17 8 mm  Gradient Pressure, Peak; Simplified Bernoulli; Antegrade Flow; Systole;: 23 6 mm[Hg]  Gradient pressure, average; Simplified Bernoulli; Antegrade Flow; Systole;: 12 mm[Hg]  Left Atrium to Aortic Root Ratio;: 1 43  Left atrial diameter; End Diastole;: 38 3 mm  Pressure Half Time;: 0 48 s  Cardiac Output; Teichholz; Systole;: 1 93 L/min  Heart rate; Teichholz;: 66 /min  Interventricular Septum Diastolic Thickness; Teichholz; End Diastole;: 14 4 mm  Left Ventricle Internal End Diastolic Dimension; Teichholz;: 35 9 mm  Left Ventricle Internal Systolic Dimension; Teichholz; End Systole;: 26 1 mm  Left Ventricle Mass; Mass AVCube with Teichholz; End Diastole;: 134 g  Left Ventricle Posterior Wall Diastolic Thickness; Teichholz; End Diastole;: 8 8 mm  Left Ventricular Ejection Fraction; Teichholz;: 54 2 %  Left Ventricular End Diastolic Volume; Teichholz;: 54 1 ml  Left Ventricular End Systolic Volume; Teichholz;: 24 8 ml  Left Ventricular Fractional Shortening;: 27 3 %  Stroke volume;  Teichholz; Systole;: 29 3 ml  Mitral Valve Area; Area by Pressure Half-Time; Systole;: 2 89 cm2  Mitral Valve E to A Ratio; Systole;: 0 93  Pressure half time; Diastole;: 0 08 s  Maximum Tricuspid valve regurgitation pressure gradient; Regurgitant Flow; Systole;: 31 7 mm[Hg]    [facility]    Prepared and electronically signed by    Vy Thakkar MD  Signed 03-Aug-2017 14:58:46         Medications:    Current Outpatient Prescriptions:     Cholecalciferol (VITAMIN D3) 5000 units CAPS, Take 1 capsule by mouth daily, Disp: , Rfl:     Cyanocobalamin (HM VITAMIN B12 PO), Take 1 tablet by mouth daily, Disp: , Rfl:     furosemide (LASIX) 40 mg tablet, Take 1 tablet by mouth daily, Disp: , Rfl:     ipratropium-albuterol (DUO-NEB) 0 5-2 5 mg/3 mL, Inhale, Disp: , Rfl:     Multiple Vitamins-Minerals (CHOICEFUL MULTIVITAMIN) CAPS, Take 1 capsule by mouth daily, Disp: , Rfl:     Potassium Chloride ER 20 MEQ TBCR, Take 1 tablet by mouth daily, Disp: , Rfl:     metoprolol succinate (TOPROL-XL) 25 mg 24 hr tablet, Take 1 tablet (25 mg total) by mouth daily, Disp: 30 tablet, Rfl: 3      Allergies: Allergies   Allergen Reactions    Codeine      Reaction Date: [id number];          Assessment and Plan:  1  Shortness of breath on exertion  Likely multifactorial from her asthma with COPD, ?diastolic CHF (NT-proBNP ordered), 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, multivalvular disease    2  Bilateral leg edema  Continue furosemide (40 mg) but increase to 1 1/2 pills daily and potassium  Keep legs elevated while in bed  Low-salt diet  3  Essential hypertension, Hypertensive heart disease without heart failure, Diastolic dysfunction  Continue furosemide  Low-dose beta-blocker added    4  Mixed hyperlipidemia  Continue diet control  Her PCP closely monitor the blood work    5  Nonrheumatic aortic valve insufficiency, Nonrheumatic mitral valve regurgitation, Non-rheumatic aortic stenosis, Nonrheumatic tricuspid valve regurgitation  Mild  Stable  Follow-up with serial echocardiograms    Recommend aggressive risk factor modification and therapeutic lifestyle changes  Low-salt, low-calorie, low-fat, low-cholesterol diet  Previous studies were reviewed  Safety measures were reviewed  Questions were entertained and answered  Patient was advised to report any problems requiring medical attention  Follow-up with PCP and appropriate specialist and lab work as discussed  Return for follow up visit as scheduled or earlier, if needed  Thank you for allowing me to participate in the care and evaluation of your patient  Should you have any questions, please feel free to contact me        Yuliana Montes MD  6/5/2018,11:07 AM

## 2018-08-19 ENCOUNTER — HOSPITAL ENCOUNTER (INPATIENT)
Facility: HOSPITAL | Age: 83
LOS: 3 days | Discharge: HOME HEALTH | DRG: 065 | End: 2018-08-22
Attending: EMERGENCY MEDICINE | Admitting: INTERNAL MEDICINE
Payer: MEDICARE

## 2018-08-19 ENCOUNTER — APPOINTMENT (EMERGENCY)
Dept: CT IMAGING | Facility: HOSPITAL | Age: 83
DRG: 065 | End: 2018-08-19
Payer: MEDICARE

## 2018-08-19 ENCOUNTER — APPOINTMENT (EMERGENCY)
Dept: RADIOLOGY | Facility: HOSPITAL | Age: 83
DRG: 065 | End: 2018-08-19
Payer: MEDICARE

## 2018-08-19 ENCOUNTER — APPOINTMENT (INPATIENT)
Dept: CT IMAGING | Facility: HOSPITAL | Age: 83
DRG: 065 | End: 2018-08-19
Payer: MEDICARE

## 2018-08-19 DIAGNOSIS — G45.9 TRANSIENT ISCHEMIC ATTACK: Primary | ICD-10-CM

## 2018-08-19 DIAGNOSIS — I63.512 CEREBROVASCULAR ACCIDENT (CVA) DUE TO OCCLUSION OF LEFT MIDDLE CEREBRAL ARTERY (HCC): ICD-10-CM

## 2018-08-19 DIAGNOSIS — I10 ESSENTIAL HYPERTENSION: ICD-10-CM

## 2018-08-19 DIAGNOSIS — I11.9 HYPERTENSIVE HEART DISEASE WITHOUT HEART FAILURE: ICD-10-CM

## 2018-08-19 LAB
ALBUMIN SERPL BCP-MCNC: 3.3 G/DL (ref 3.5–5)
ALP SERPL-CCNC: 103 U/L (ref 46–116)
ALT SERPL W P-5'-P-CCNC: 22 U/L (ref 12–78)
ANION GAP SERPL CALCULATED.3IONS-SCNC: 9 MMOL/L (ref 4–13)
AST SERPL W P-5'-P-CCNC: 22 U/L (ref 5–45)
ATRIAL RATE: 63 BPM
ATRIAL RATE: 65 BPM
ATRIAL RATE: 69 BPM
BASOPHILS # BLD AUTO: 0.03 THOUSANDS/ΜL (ref 0–0.1)
BASOPHILS NFR BLD AUTO: 0 % (ref 0–1)
BILIRUB DIRECT SERPL-MCNC: 0.11 MG/DL (ref 0–0.2)
BILIRUB SERPL-MCNC: 0.4 MG/DL (ref 0.2–1)
BUN SERPL-MCNC: 15 MG/DL (ref 5–25)
CALCIUM SERPL-MCNC: 9.6 MG/DL (ref 8.3–10.1)
CHLORIDE SERPL-SCNC: 99 MMOL/L (ref 100–108)
CO2 SERPL-SCNC: 31 MMOL/L (ref 21–32)
CREAT SERPL-MCNC: 0.86 MG/DL (ref 0.6–1.3)
EOSINOPHIL # BLD AUTO: 0.1 THOUSAND/ΜL (ref 0–0.61)
EOSINOPHIL NFR BLD AUTO: 1 % (ref 0–6)
ERYTHROCYTE [DISTWIDTH] IN BLOOD BY AUTOMATED COUNT: 14.3 % (ref 11.6–15.1)
GFR SERPL CREATININE-BSD FRML MDRD: 57 ML/MIN/1.73SQ M
GLUCOSE SERPL-MCNC: 121 MG/DL (ref 65–140)
HCT VFR BLD AUTO: 39.9 % (ref 34.8–46.1)
HGB BLD-MCNC: 13.1 G/DL (ref 11.5–15.4)
IMM GRANULOCYTES # BLD AUTO: 0.02 THOUSAND/UL (ref 0–0.2)
IMM GRANULOCYTES NFR BLD AUTO: 0 % (ref 0–2)
LYMPHOCYTES # BLD AUTO: 2.74 THOUSANDS/ΜL (ref 0.6–4.47)
LYMPHOCYTES NFR BLD AUTO: 37 % (ref 14–44)
MCH RBC QN AUTO: 29.8 PG (ref 26.8–34.3)
MCHC RBC AUTO-ENTMCNC: 32.8 G/DL (ref 31.4–37.4)
MCV RBC AUTO: 91 FL (ref 82–98)
MONOCYTES # BLD AUTO: 0.74 THOUSAND/ΜL (ref 0.17–1.22)
MONOCYTES NFR BLD AUTO: 10 % (ref 4–12)
NEUTROPHILS # BLD AUTO: 3.77 THOUSANDS/ΜL (ref 1.85–7.62)
NEUTS SEG NFR BLD AUTO: 52 % (ref 43–75)
NRBC BLD AUTO-RTO: 0 /100 WBCS
NT-PROBNP SERPL-MCNC: 721 PG/ML
P AXIS: 20 DEGREES
P AXIS: 24 DEGREES
P AXIS: 27 DEGREES
PLATELET # BLD AUTO: 174 THOUSANDS/UL (ref 149–390)
PMV BLD AUTO: 12.2 FL (ref 8.9–12.7)
POTASSIUM SERPL-SCNC: 3.3 MMOL/L (ref 3.5–5.3)
PR INTERVAL: 148 MS
PR INTERVAL: 156 MS
PR INTERVAL: 164 MS
PROT SERPL-MCNC: 7.4 G/DL (ref 6.4–8.2)
QRS AXIS: 36 DEGREES
QRS AXIS: 42 DEGREES
QRS AXIS: 46 DEGREES
QRSD INTERVAL: 102 MS
QRSD INTERVAL: 126 MS
QRSD INTERVAL: 98 MS
QT INTERVAL: 346 MS
QT INTERVAL: 412 MS
QT INTERVAL: 438 MS
QTC INTERVAL: 359 MS
QTC INTERVAL: 441 MS
QTC INTERVAL: 448 MS
RBC # BLD AUTO: 4.39 MILLION/UL (ref 3.81–5.12)
SODIUM SERPL-SCNC: 139 MMOL/L (ref 136–145)
T WAVE AXIS: -18 DEGREES
T WAVE AXIS: 21 DEGREES
T WAVE AXIS: 33 DEGREES
TROPONIN I SERPL-MCNC: 0.02 NG/ML
TROPONIN I SERPL-MCNC: <0.02 NG/ML
VENTRICULAR RATE: 63 BPM
VENTRICULAR RATE: 65 BPM
VENTRICULAR RATE: 69 BPM
WBC # BLD AUTO: 7.4 THOUSAND/UL (ref 4.31–10.16)

## 2018-08-19 PROCEDURE — 71275 CT ANGIOGRAPHY CHEST: CPT

## 2018-08-19 PROCEDURE — 85025 COMPLETE CBC W/AUTO DIFF WBC: CPT | Performed by: EMERGENCY MEDICINE

## 2018-08-19 PROCEDURE — 70450 CT HEAD/BRAIN W/O DYE: CPT

## 2018-08-19 PROCEDURE — 71046 X-RAY EXAM CHEST 2 VIEWS: CPT

## 2018-08-19 PROCEDURE — 93005 ELECTROCARDIOGRAM TRACING: CPT

## 2018-08-19 PROCEDURE — 74175 CTA ABDOMEN W/CONTRAST: CPT

## 2018-08-19 PROCEDURE — 80076 HEPATIC FUNCTION PANEL: CPT | Performed by: EMERGENCY MEDICINE

## 2018-08-19 PROCEDURE — 99223 1ST HOSP IP/OBS HIGH 75: CPT | Performed by: INTERNAL MEDICINE

## 2018-08-19 PROCEDURE — 84484 ASSAY OF TROPONIN QUANT: CPT | Performed by: EMERGENCY MEDICINE

## 2018-08-19 PROCEDURE — 83880 ASSAY OF NATRIURETIC PEPTIDE: CPT | Performed by: EMERGENCY MEDICINE

## 2018-08-19 PROCEDURE — 36415 COLL VENOUS BLD VENIPUNCTURE: CPT | Performed by: EMERGENCY MEDICINE

## 2018-08-19 PROCEDURE — 93010 ELECTROCARDIOGRAM REPORT: CPT | Performed by: INTERNAL MEDICINE

## 2018-08-19 PROCEDURE — 80048 BASIC METABOLIC PNL TOTAL CA: CPT | Performed by: EMERGENCY MEDICINE

## 2018-08-19 RX ORDER — MAGNESIUM HYDROXIDE/ALUMINUM HYDROXICE/SIMETHICONE 120; 1200; 1200 MG/30ML; MG/30ML; MG/30ML
20 SUSPENSION ORAL ONCE
Status: COMPLETED | OUTPATIENT
Start: 2018-08-19 | End: 2018-08-20

## 2018-08-19 RX ORDER — MORPHINE SULFATE 4 MG/ML
4 INJECTION, SOLUTION INTRAMUSCULAR; INTRAVENOUS ONCE
Status: COMPLETED | OUTPATIENT
Start: 2018-08-19 | End: 2018-08-19

## 2018-08-19 RX ORDER — ONDANSETRON 2 MG/ML
4 INJECTION INTRAMUSCULAR; INTRAVENOUS ONCE
Status: COMPLETED | OUTPATIENT
Start: 2018-08-19 | End: 2018-08-19

## 2018-08-19 RX ADMIN — ONDANSETRON 4 MG: 2 INJECTION INTRAMUSCULAR; INTRAVENOUS at 22:15

## 2018-08-19 RX ADMIN — IOHEXOL 100 ML: 350 INJECTION, SOLUTION INTRAVENOUS at 22:47

## 2018-08-19 RX ADMIN — MORPHINE SULFATE 4 MG: 4 INJECTION INTRAVENOUS at 22:15

## 2018-08-20 ENCOUNTER — APPOINTMENT (INPATIENT)
Dept: NON INVASIVE DIAGNOSTICS | Facility: HOSPITAL | Age: 83
DRG: 065 | End: 2018-08-20
Payer: MEDICARE

## 2018-08-20 ENCOUNTER — APPOINTMENT (INPATIENT)
Dept: MRI IMAGING | Facility: HOSPITAL | Age: 83
DRG: 065 | End: 2018-08-20
Payer: MEDICARE

## 2018-08-20 ENCOUNTER — APPOINTMENT (INPATIENT)
Dept: ULTRASOUND IMAGING | Facility: HOSPITAL | Age: 83
DRG: 065 | End: 2018-08-20
Payer: MEDICARE

## 2018-08-20 PROBLEM — K44.9 HIATAL HERNIA: Status: ACTIVE | Noted: 2018-08-20

## 2018-08-20 PROBLEM — I63.512 CEREBROVASCULAR ACCIDENT (CVA) DUE TO OCCLUSION OF LEFT MIDDLE CEREBRAL ARTERY (HCC): Status: ACTIVE | Noted: 2018-08-19

## 2018-08-20 PROBLEM — E78.00 HYPERCHOLESTEROLEMIA: Status: ACTIVE | Noted: 2018-08-20

## 2018-08-20 LAB
ANION GAP SERPL CALCULATED.3IONS-SCNC: 6 MMOL/L (ref 4–13)
BACTERIA UR QL AUTO: ABNORMAL /HPF
BILIRUB UR QL STRIP: NEGATIVE
BUN SERPL-MCNC: 13 MG/DL (ref 5–25)
CALCIUM SERPL-MCNC: 9.3 MG/DL (ref 8.3–10.1)
CHLORIDE SERPL-SCNC: 103 MMOL/L (ref 100–108)
CHOLEST SERPL-MCNC: 275 MG/DL (ref 50–200)
CLARITY UR: CLEAR
CO2 SERPL-SCNC: 31 MMOL/L (ref 21–32)
COLOR UR: YELLOW
CREAT SERPL-MCNC: 0.84 MG/DL (ref 0.6–1.3)
ERYTHROCYTE [DISTWIDTH] IN BLOOD BY AUTOMATED COUNT: 14.3 % (ref 11.6–15.1)
EST. AVERAGE GLUCOSE BLD GHB EST-MCNC: 126 MG/DL
GFR SERPL CREATININE-BSD FRML MDRD: 58 ML/MIN/1.73SQ M
GLUCOSE SERPL-MCNC: 102 MG/DL (ref 65–140)
GLUCOSE UR STRIP-MCNC: NEGATIVE MG/DL
HBA1C MFR BLD: 6 % (ref 4.2–6.3)
HCT VFR BLD AUTO: 38.8 % (ref 34.8–46.1)
HDLC SERPL-MCNC: 59 MG/DL (ref 40–60)
HGB BLD-MCNC: 12.8 G/DL (ref 11.5–15.4)
HGB UR QL STRIP.AUTO: NEGATIVE
KETONES UR STRIP-MCNC: NEGATIVE MG/DL
LDLC SERPL CALC-MCNC: 196 MG/DL (ref 0–100)
LEUKOCYTE ESTERASE UR QL STRIP: ABNORMAL
MCH RBC QN AUTO: 30 PG (ref 26.8–34.3)
MCHC RBC AUTO-ENTMCNC: 33 G/DL (ref 31.4–37.4)
MCV RBC AUTO: 91 FL (ref 82–98)
NITRITE UR QL STRIP: NEGATIVE
NON-SQ EPI CELLS URNS QL MICRO: ABNORMAL /HPF
PH UR STRIP.AUTO: 7 [PH] (ref 4.5–8)
PLATELET # BLD AUTO: 154 THOUSANDS/UL (ref 149–390)
PMV BLD AUTO: 12.1 FL (ref 8.9–12.7)
POTASSIUM SERPL-SCNC: 3.5 MMOL/L (ref 3.5–5.3)
PROT UR STRIP-MCNC: NEGATIVE MG/DL
RBC # BLD AUTO: 4.26 MILLION/UL (ref 3.81–5.12)
RBC #/AREA URNS AUTO: ABNORMAL /HPF
SODIUM SERPL-SCNC: 140 MMOL/L (ref 136–145)
SP GR UR STRIP.AUTO: 1.01 (ref 1–1.03)
TRIGL SERPL-MCNC: 98 MG/DL
UROBILINOGEN UR QL STRIP.AUTO: 0.2 E.U./DL
WBC # BLD AUTO: 7.56 THOUSAND/UL (ref 4.31–10.16)
WBC #/AREA URNS AUTO: ABNORMAL /HPF

## 2018-08-20 PROCEDURE — 93306 TTE W/DOPPLER COMPLETE: CPT

## 2018-08-20 PROCEDURE — 97167 OT EVAL HIGH COMPLEX 60 MIN: CPT

## 2018-08-20 PROCEDURE — 83036 HEMOGLOBIN GLYCOSYLATED A1C: CPT | Performed by: PHYSICIAN ASSISTANT

## 2018-08-20 PROCEDURE — 94762 N-INVAS EAR/PLS OXIMTRY CONT: CPT

## 2018-08-20 PROCEDURE — G8987 SELF CARE CURRENT STATUS: HCPCS

## 2018-08-20 PROCEDURE — 99223 1ST HOSP IP/OBS HIGH 75: CPT | Performed by: PSYCHIATRY & NEUROLOGY

## 2018-08-20 PROCEDURE — G8978 MOBILITY CURRENT STATUS: HCPCS

## 2018-08-20 PROCEDURE — 93306 TTE W/DOPPLER COMPLETE: CPT | Performed by: INTERNAL MEDICINE

## 2018-08-20 PROCEDURE — 97530 THERAPEUTIC ACTIVITIES: CPT

## 2018-08-20 PROCEDURE — 99285 EMERGENCY DEPT VISIT HI MDM: CPT

## 2018-08-20 PROCEDURE — 80048 BASIC METABOLIC PNL TOTAL CA: CPT | Performed by: PHYSICIAN ASSISTANT

## 2018-08-20 PROCEDURE — 99222 1ST HOSP IP/OBS MODERATE 55: CPT | Performed by: PHYSICAL MEDICINE & REHABILITATION

## 2018-08-20 PROCEDURE — G8979 MOBILITY GOAL STATUS: HCPCS

## 2018-08-20 PROCEDURE — 81001 URINALYSIS AUTO W/SCOPE: CPT | Performed by: INTERNAL MEDICINE

## 2018-08-20 PROCEDURE — 70551 MRI BRAIN STEM W/O DYE: CPT

## 2018-08-20 PROCEDURE — 92610 EVALUATE SWALLOWING FUNCTION: CPT

## 2018-08-20 PROCEDURE — 97110 THERAPEUTIC EXERCISES: CPT

## 2018-08-20 PROCEDURE — 99233 SBSQ HOSP IP/OBS HIGH 50: CPT | Performed by: INTERNAL MEDICINE

## 2018-08-20 PROCEDURE — 85027 COMPLETE CBC AUTOMATED: CPT | Performed by: PHYSICIAN ASSISTANT

## 2018-08-20 PROCEDURE — 97163 PT EVAL HIGH COMPLEX 45 MIN: CPT

## 2018-08-20 PROCEDURE — G8988 SELF CARE GOAL STATUS: HCPCS

## 2018-08-20 PROCEDURE — 80061 LIPID PANEL: CPT | Performed by: PHYSICIAN ASSISTANT

## 2018-08-20 RX ORDER — HEPARIN SODIUM 5000 [USP'U]/ML
5000 INJECTION, SOLUTION INTRAVENOUS; SUBCUTANEOUS EVERY 8 HOURS SCHEDULED
Status: DISCONTINUED | OUTPATIENT
Start: 2018-08-20 | End: 2018-08-22 | Stop reason: HOSPADM

## 2018-08-20 RX ORDER — ONDANSETRON 2 MG/ML
4 INJECTION INTRAMUSCULAR; INTRAVENOUS EVERY 6 HOURS PRN
Status: DISCONTINUED | OUTPATIENT
Start: 2018-08-20 | End: 2018-08-22 | Stop reason: HOSPADM

## 2018-08-20 RX ORDER — ACETAMINOPHEN 325 MG/1
650 TABLET ORAL EVERY 6 HOURS PRN
Status: DISCONTINUED | OUTPATIENT
Start: 2018-08-20 | End: 2018-08-22 | Stop reason: HOSPADM

## 2018-08-20 RX ORDER — POTASSIUM CHLORIDE 20 MEQ/1
20 TABLET, EXTENDED RELEASE ORAL DAILY
Status: DISCONTINUED | OUTPATIENT
Start: 2018-08-20 | End: 2018-08-22 | Stop reason: HOSPADM

## 2018-08-20 RX ORDER — ATORVASTATIN CALCIUM 40 MG/1
40 TABLET, FILM COATED ORAL EVERY EVENING
Status: DISCONTINUED | OUTPATIENT
Start: 2018-08-20 | End: 2018-08-22 | Stop reason: HOSPADM

## 2018-08-20 RX ORDER — ASPIRIN 81 MG/1
81 TABLET, CHEWABLE ORAL DAILY
Status: DISCONTINUED | OUTPATIENT
Start: 2018-08-20 | End: 2018-08-22 | Stop reason: HOSPADM

## 2018-08-20 RX ADMIN — ACETAMINOPHEN 650 MG: 325 TABLET, FILM COATED ORAL at 18:43

## 2018-08-20 RX ADMIN — ONDANSETRON 4 MG: 2 INJECTION INTRAMUSCULAR; INTRAVENOUS at 09:26

## 2018-08-20 RX ADMIN — ATORVASTATIN CALCIUM 40 MG: 40 TABLET, FILM COATED ORAL at 17:10

## 2018-08-20 RX ADMIN — POTASSIUM CHLORIDE 20 MEQ: 1500 TABLET, EXTENDED RELEASE ORAL at 09:25

## 2018-08-20 RX ADMIN — HEPARIN SODIUM 5000 UNITS: 5000 INJECTION, SOLUTION INTRAVENOUS; SUBCUTANEOUS at 14:54

## 2018-08-20 RX ADMIN — HEPARIN SODIUM 5000 UNITS: 5000 INJECTION, SOLUTION INTRAVENOUS; SUBCUTANEOUS at 00:39

## 2018-08-20 RX ADMIN — ASPIRIN 81 MG CHEWABLE TABLET 81 MG: 81 TABLET CHEWABLE at 09:25

## 2018-08-20 RX ADMIN — ALUMINUM HYDROXIDE, MAGNESIUM HYDROXIDE, AND SIMETHICONE 20 ML: 200; 200; 20 SUSPENSION ORAL at 00:36

## 2018-08-20 RX ADMIN — HEPARIN SODIUM 5000 UNITS: 5000 INJECTION, SOLUTION INTRAVENOUS; SUBCUTANEOUS at 23:00

## 2018-08-20 RX ADMIN — HEPARIN SODIUM 5000 UNITS: 5000 INJECTION, SOLUTION INTRAVENOUS; SUBCUTANEOUS at 05:37

## 2018-08-20 RX ADMIN — FUROSEMIDE 60 MG: 40 TABLET ORAL at 09:25

## 2018-08-20 RX ADMIN — ATORVASTATIN CALCIUM 40 MG: 40 TABLET, FILM COATED ORAL at 00:40

## 2018-08-20 NOTE — PLAN OF CARE
Activity Intolerance/Impaired Mobility     Mobility/activity is maintained at optimum level for patient Progressing        Communication Impairment     Ability to express needs and understand communication Carolina Campos Discharge to home or other facility with appropriate resources Progressing        INFECTION - ADULT     Absence or prevention of progression during hospitalization Progressing        Knowledge Deficit     Patient/family/caregiver demonstrates understanding of disease process, treatment plan, medications, and discharge instructions Progressing        Neurological Deficit     Neurological status is stable or improving Progressing        Nutrition     Nutrition/Hydration status is improving Progressing        PAIN - ADULT     Verbalizes/displays adequate comfort level or baseline comfort level Progressing        Potential for Aspiration     Non-ventilated patient's risk of aspiration is minimized Progressing        Potential for Falls     Patient will remain free of falls Progressing        Prexisting or High Potential for Compromised Skin Integrity     Skin integrity is maintained or improved Progressing        SAFETY ADULT     Maintain or return to baseline ADL function Progressing     Maintain or return mobility status to optimal level Progressing     Patient will remain free of falls Progressing

## 2018-08-20 NOTE — ED NOTES
Pt c/o of mid epigastric/ left sided chest pain and SOB  Dr Rj Sepulveda made aware and is at bedside        Blake Yung RN  08/19/18 5029

## 2018-08-20 NOTE — PROGRESS NOTES
[facility] Internal Medicine Progress Note  Patient: Indigo Pascal 80 y o  female   MRN: [id number]  PCP: Anh Nunez MD  Unit/Bed#: -01 Encounter: [id number]  Date Of Visit: 08/20/18    Assessment/Plan:    MRI shows acute small infarct  Patient with acute CVA  Family was updated  She is on baby aspirin  Continue with PT/OT  MRI results as below:    Small acute infarct identified within the posterior aspect of the left lentiform nucleus best seen on series 3 image 12  No mass effect or hemorrhagic transformation      Diffuse cerebral volume loss and extensive chronic microangiopathic change within both cerebral hemispheres

## 2018-08-20 NOTE — PLAN OF CARE
Problem: PHYSICAL THERAPY ADULT  Goal: Performs mobility at highest level of function for planned discharge setting  See evaluation for individualized goals  Treatment/Interventions: Functional transfer training, LE strengthening/ROM, Elevations, Therapeutic exercise, Endurance training, Cognitive reorientation, Patient/family training, Equipment eval/education, Bed mobility, Gait training, Spoke to nursing, Spoke to case management, OT  Equipment Recommended: Pricilla Dueñas       See flowsheet documentation for full assessment, interventions and recommendations  Prognosis: Good  Problem List: Decreased strength, Decreased endurance, Impaired balance, Decreased mobility, Decreased safety awareness, Impaired hearing, Pain  Assessment: Pt is 80 y o  female seen for PT evaluation on 8/20/2018 s/p admit to [facility] on 8/19/2018 w/ TIA (transient ischemic attack)- pt presented to ED w/ c/o episode of slurred speech and R hand weakness  PT consulted to assess pt's functional mobility and d/c needs  Order placed for PT eval and tx, w/ up w/ A and activity as tolerated order  Performed at least 2 patient identifiers during session: Name and wristband  Comorbidities affecting pt's physical performance at time of assessment include: bilateral leg edema, arthritis, asthma, COPD, HTN  PTA, pt was independent w/ all functional mobility w/ SPC, ambulates community distances and elevations, ambulates household distances, has 3 DANIEL, lives w/ son and DIL in 2 level home and retired   Personal factors affecting pt at time of IE include: lives in 2 story house, ambulating w/ assistive device, stairs to enter home, inability to ambulate household distances, inability to navigate community distances, inability to navigate level surfaces w/o external assistance, decreased cognition, positive fall history, hearing impairments, decreased initiation and engagement, unable to perform physical activity, limited insight into impairments, inability to perform IADLs and inability to perform ADLs  Please find objective findings from PT assessment regarding body systems outlined above with impairments and limitations including weakness, impaired balance, decreased endurance, pain, decreased activity tolerance, decreased functional mobility tolerance, decreased safety awareness, fall risk and SOB upon exertion, as well as mobility assessment (need for minimal assist w/ all phases of mobility when usually ambulating independently)  The following objective measures performed on IE also reveal limitations: Barthel Index: 40/100 and Modified Christos: 4 (moderate/severe disability)  Pt's clinical presentation is currently unstable/unpredictable seen in pt's presentation of need for input for task focus and mobility technique and ongoing medical assessment  Pt to benefit from continued PT tx to address deficits as defined above and maximize level of functional independent mobility and consistency  From PT/mobility standpoint, recommendation at time of d/c would be STR vs  Home PT w/ 24/7 S/A, pending progress in order to facilitate return to PLOF  Barriers to Discharge: Inaccessible home environment (pt has 24/7 S/A per reports)     Recommendation: Short-term skilled PT (vs  HHPT, pending progress)     PT - OK to Discharge: Yes (when medically cleared, if to STR)    See flowsheet documentation for full assessment

## 2018-08-20 NOTE — ASSESSMENT & PLAN NOTE
A small infarct is noted in the posterior aspect of the left lentiform nucleus on MRI scanning  Patient reports that she is doing quite well today  She has been started on aspirin, low-dose 81 mg as well as a statin  Her carotids are pending her echocardiogram does not note any atrial enlargement and a good ejection fraction  She does have valvular disease  It is certainly likely that this was a small vessel infarct  All of the above was discussed with her daughter in-law at the bedside  We will she her in the office on Parkinson's wrote in approximately 1 month

## 2018-08-20 NOTE — PHYSICAL THERAPY NOTE
Physical Therapy Treatment Note     08/20/18 6112   Pain Assessment   Pain Assessment 0-10   Pain Score 3   Pain Type Chronic pain  ("cramps", "I've had them for a while now")   Pain Location Leg   Pain Orientation Right; Lower   Pain Descriptors Cramping   Pain Onset Ongoing   Response to Interventions RN aware of pt's pain, notes pt has cramps at baseline   Multiple Pain Sites No   Restrictions/Precautions   Weight Bearing Precautions Per Order No   Braces or Orthoses (none per pt)   Other Precautions Chair Alarm; Bed Alarm;Multiple lines;Telemetry;O2;Fall Risk;Pain;Hard of hearing  (2L O2 NC)   General   Chart Reviewed Yes   Response to Previous Treatment Patient with no complaints from previous session  Family/Caregiver Present No   Cognition   Overall Cognitive Status Impaired   Arousal/Participation Alert; Responsive; Cooperative   Attention Attends with cues to redirect   Orientation Level Oriented to person;Oriented to place; Disoriented to time;Disoriented to situation   Memory Decreased short term memory;Decreased recall of recent events;Decreased recall of precautions   Following Commands Follows one step commands without difficulty   Comments pt agreeable to PT session   Subjective   Subjective "I need to digest my food alittle first"   Transfers   Sit to Stand 4  Minimal assistance   Additional items Assist x 2;Armrests; Increased time required; Impulsive;Verbal cues   Stand to Sit 4  Minimal assistance   Additional items Assist x 2;Armrests; Increased time required; Impulsive;Verbal cues   Toilet transfer 4  Minimal assistance   Additional items Assist x 2;Armrests; Increased time required; Impulsive;Verbal cues   Ambulation/Elevation   Gait pattern Improper Weight shift; Forward Flexion;Decreased foot clearance;Shuffling; Short stride; Step to;Excessively slow   Gait Assistance 4  Minimal assist   Additional items Assist x 2; Tactile cues; Verbal cues   Assistive Device Rolling walker   Distance 5' x2 BSC<>recliner Stair Management Assistance Not tested   Balance   Static Sitting Fair +   Dynamic Sitting Fair   Static Standing Fair -   Dynamic Standing Poor +   Ambulatory Poor +   Endurance Deficit   Endurance Deficit Yes   Activity Tolerance   Activity Tolerance Patient limited by fatigue   Nurse Made Aware Yes, RN Kushal Gonzalez verbalized pt appropriate for PT session, present for assistance   Exercises   Hip Flexion Sitting;10 reps;AROM; Bilateral   Hip Abduction Sitting;10 reps;AROM; Bilateral   Knee AROM Long Arc Quad Sitting;10 reps;AROM; Bilateral   Marching Sitting;10 reps;AROM; Bilateral   Assessment   Prognosis Good   Problem List Decreased strength;Decreased endurance; Impaired balance;Decreased mobility; Decreased safety awareness; Impaired hearing;Pain   Assessment Pt seen for PT treatment session this date with interventions consisting of Therapeutic exercise consisting of: AROM 10 reps B LE in sitting position and therapeutic activity consisting of training: sit<>stand transfers, vc and tactile cues for static standing posture faciliation and short gait trial from chair to [facility]  Pt agreeable to PT treatment session upon arrival, pt found seated OOB in recliner, in no apparent distress, A&O x 2 and responsive  Pt requiring maximal vc for task focus and safety, pt impulsive throughout, high fall risk 2/2 such  Pt able to tolerate seated ther ex in pain free range  Pt reporting chronic lower leg cramps, RN aware of such, no worsening of such with short gait trial to/from [facility]  Post session: pt returned back to recliner, chair alarm engaged, all needs in reach and RN notified of session findings/recommendations and NSG staff educated/encouraged to mobilize A of 2 for safety with RW  Continue to recommend STR vs HHPT with family support at time of d/c in order to maximize pt's functional independence and safety w/ mobility   Pt continues to be functioning below baseline level, and remains limited 2* factors listed above and including at risk for falls  PT will continue to see pt while here in order to address the deficits listed above and provide interventions consistent w/ POC in effort to achieve STGs  Barriers to Discharge Inaccessible home environment   Goals   Patient Goals to return home   STG Expiration Date 08/30/18   Short Term Goal #1 STGs remain appropriate   Treatment Day 1   Plan   Treatment/Interventions Functional transfer training;LE strengthening/ROM; Elevations; Therapeutic exercise; Endurance training;Cognitive reorientation;Patient/family training;Equipment eval/education; Bed mobility;Gait training;Spoke to nursing   Progress Slow progress, decreased activity tolerance   PT Frequency 5x/wk; Weekend   Recommendation   Recommendation Short-term skilled PT  (vs  HHPT, pending progress)   Equipment Recommended Walker   PT - OK to Discharge Yes  (when medically cleared, if to STR)       Sammi Koenig, PT, DPT    Time of PT treatment session: [id number]

## 2018-08-20 NOTE — PROGRESS NOTES
[facility] Internal Medicine Progress Note  Patient: Jennifer Stinson 80 y o  female   MRN: [id number]  PCP: Guerline Sky MD  Unit/Bed#: [facility] Encounter: [id number]  Date Of Visit: 08/20/18    Assessment/Plan:    Principal Problem:    TIA (transient ischemic attack)  Active Problems:    Bilateral leg edema    Essential hypertension    Hiatal hernia    Hypercholesterolemia    Present on Admission:   TIA (transient ischemic attack)   Bilateral leg edema   Essential hypertension   Hiatal hernia   Hypercholesterolemia        Patient tells me that she is here because she had pain all over and did not feel well at home  She herself does not say that she had any trouble talking or any weakness in any her arms  She has chronic swelling of her ankles/legs  She denies any chest pain  Denies any abdominal pain other than generalized pain all over her body  At the moment, she is feeling nauseous  Vital signs reviewed  Patient sitting up in the chair  Blood pressure slightly on the lower side  Feeling nauseous  She is hard of area, however, she is very much oriented, place and person  S1 and S2 audible  Creased breath sounds at the bases  She has bilateral lower extremities/ankle edema  Abdomen is soft  Grossly nontender  Bowel sounds are audible  She had no UA done  Would check for that  Continue with current other treatment  MRI is pending  PT/OT  May need short-term rehab  · Possible TIA  MRI is pending  Would also get carotid ultrasound  She is on aspirin and statin  Continue with PT/OT  Possible short-term rehab  · Nausea and vomiting  She has large hiatal hernia likely causing her symptoms  No further recommendations at this point  · Hypercholesterolemia  On statin  · Leg edema  Chronic on Lasix  Continue to maintain Lasix and potassium    Potassium slightly on the lower side      VTE Pharmacologic Prophylaxis:   Pharmacologic: Heparin  Mechanical VTE Prophylaxis in Place: Yes    Patient Centered Rounds: I have performed bedside rounds with nursing staff today  Discussions with Specialists or Other Care Team Provider:  Yes    Education and Discussions with Family / Patient:  Yes including patient's son over the phone    Time Spent for Care: 35+ minutes  More than 50% of total time spent on counseling and coordination of care as described above  Current Length of Stay: 1 day(s)    Current Patient Status: Inpatient   Certification Statement: The patient will continue to require additional inpatient hospital stay due to Further stroke workup    Discharge Plan:  Possible short-term rehab    Code Status: Level 1 - Full Code      Subjective:   Patient herself is feeling nauseous  I am not sure if she has underlying dementia or not  She tells me that she is here because she had pain all over her body  She was dizzy and also had some off and on pain in her belly  I spoke with patient's son over the phone moved tells me that patient was unsteady on her feet  Then she had hard time using her right hand to eat  She was dizzy although did not follow    Objective:     Vitals:   Temp (24hrs), Av 8 °F (36 6 °C), Min:97 5 °F (36 4 °C), Max:98 6 °F (37 °C)    HR:  [56-67] 62  Resp:  [18-20] 20  BP: (103-145)/(11-77) 125/62  SpO2:  [93 %-99 %] 97 %  Body mass index is 23 29 kg/m²  Input and Output Summary (last 24 hours): Intake/Output Summary (Last 24 hours) at 18 1422  Last data filed at 18 1421   Gross per 24 hour   Intake              540 ml   Output             1300 ml   Net             -760 ml           Physical Exam:     Vital signs reviewed as above  Patient up in the chair  Feeling nauseous  Does feel good at this moment  Decreased breath sounds at the bases bilaterally on auscultation  S1 and S2 audible  Awake and alert  She knows that she is in the hospital   She also knows that she lives with her son  She has bilateral lower extremities is edema  It is chronic and has not changed much as per patient  Abdomen is soft  Grossly nontender  Bowel sounds are audible  Could not make her walk  Has chronic arthritic joints  Skin is warm and dry  Oropharynx are slightly dry      Additional Data:     Labs:      Results from last 7 days  Lab Units 08/20/18 0438 08/19/18 2008   WBC Thousand/uL 7 56 7 40   HEMOGLOBIN g/dL 12 8 13 1   HEMATOCRIT % 38 8 39 9   PLATELETS Thousands/uL 154 174   NEUTROS PCT %  --  52   LYMPHS PCT %  --  37   MONOS PCT %  --  10   EOS PCT %  --  1       Results from last 7 days  Lab Units 08/20/18  0438 08/19/18 2009   SODIUM mmol/L 140 139   POTASSIUM mmol/L 3 5 3 3*   CHLORIDE mmol/L 103 99*   CO2 mmol/L 31 31   BUN mg/dL 13 15   CREATININE mg/dL 0 84 0 86   CALCIUM mg/dL 9 3 9 6   TOTAL PROTEIN g/dL  --  7 4   BILIRUBIN TOTAL mg/dL  --  0 40   ALK PHOS U/L  --  103   ALT U/L  --  22   AST U/L  --  22   GLUCOSE RANDOM mg/dL 102 121           * I Have Reviewed All Lab Data Listed Above  * Additional Pertinent Lab Tests Reviewed: All Labs Within Last 24 Hours Reviewed    MRI results pending      Recent Cultures (last 7 days):           Last 24 Hours Medication List:     Current Facility-Administered Medications:  acetaminophen 650 mg Oral Q6H PRN Carly Jackie, PA-C   aspirin 81 mg Oral Daily Carly Jackie, PA-C   atorvastatin 40 mg Oral QPM Carly Jackie, PA-C   furosemide 60 mg Oral Daily Carly Jackie, PA-C   heparin (porcine) 5,000 Units Subcutaneous [facility] Carly Jackie, PA-C   ondansetron 4 mg Intravenous Q6H PRN Carly Jackie, PA-C   potassium chloride 20 mEq Oral Daily Carly Jackie, PA-C        Today, Patient Was Seen By: Rhys Peralta MD    ** Please Note: Dragon 360 Dictation voice to text software may have been used in the creation of this document   **

## 2018-08-20 NOTE — CASE MANAGEMENT
Initial Clinical Review    Admission: Date/Time/Statement: 8/19/18 @ 2113     Orders Placed This Encounter   Procedures    Inpatient Admission (expected length of stay for this patient is greater than two midnights)     Standing Status:   Standing     Number of Occurrences:   1     Order Specific Question:   Admitting Physician     Answer:   Chad Sevilla [[id number]]     Order Specific Question:   Level of Care     Answer:   Med Surg [16]     Order Specific Question:   Estimated length of stay     Answer:   More than 2 Midnights     Order Specific Question:   Certification     Answer:   I certify that inpatient services are medically necessary for this patient for a duration of greater than two midnights  See H&P and MD Progress Notes for additional information about the patient's course of treatment  ED: Date/Time/Mode of Arrival:   ED Arrival Information     Expected Arrival Acuity Means of Arrival Escorted By Service Admission Type    - 8/19/2018 19:37 Urgent Ambulance [facility] General Medicine Urgent    Arrival Complaint    -          Chief Complaint:   Chief Complaint   Patient presents with    Weakness - Generalized     As per EMS pt son reports pt has been acting different and presents as being weak  Pt denies any complaints at this time  History of Illness:    80 y o  female who presents with complaints of an episode of slurred speech and right hand weakness  Patient states she had difficulty walking due to leg weakness  She states around 6:00 p m  she was walking and then felt as though she could not move her legs very well  So the felt like her fever stuck to the floor  Showed so noted right hand weakness  She difficulty speaking but this at all since resolved        ED Vital Signs:   ED Triage Vitals   Temperature Pulse Respirations Blood Pressure SpO2   08/19/18 1944 08/19/18 1944 08/19/18 1944 08/19/18 1944 08/19/18 1944   97 6 °F (36 4 °C) 67 20 139/77 96 %      Temp Source Heart Rate Source Patient Position - Orthostatic VS BP Location FiO2 (%)   08/19/18 1944 08/19/18 1944 08/19/18 1944 08/19/18 1944 --   Oral Monitor Sitting Right arm       Pain Score       08/19/18 2215       Worst Possible Pain        Wt Readings from Last 1 Encounters:   08/20/18 54 1 kg (119 lb 4 3 oz)       Abnormal Labs/Diagnostic Test Results:   K  3 3   3 5  Cl   99   103  bnp  721    CT head -  Neg    MRI brain    Small focus of acute ischemia identified within the posterior aspect of the left lentiform nucleus  No mass effect or hemorrhagic transformation    CTAP - no aortic dissection or intramural hematoma    No pneumothorax     hypoventilatory changes in the lower lobes  ED Treatment:   Medication Administration from 08/19/2018 1937 to 08/20/2018 0006       Date/Time Order Dose Route Action Action by Comments     08/19/2018 2215 morphine (PF) 4 mg/mL injection 4 mg 4 mg Intravenous Given Michi Mas RN      08/19/2018 2215 ondansetron (ZOFRAN) injection 4 mg 4 mg Intravenous Given Michi Mas RN                      Past Medical/Surgical History:    Active Ambulatory Problems     Diagnosis Date Noted    Shortness of breath on exertion 06/05/2018    Bilateral leg edema 06/05/2018    Essential hypertension 06/05/2018    Hypertensive heart disease without heart failure 83/11/2028    Diastolic dysfunction 48/16/4156    Mixed hyperlipidemia 06/05/2018    Nonrheumatic aortic valve insufficiency 06/05/2018    Nonrheumatic mitral valve regurgitation 06/05/2018    Non-rheumatic aortic stenosis 06/05/2018    Nonrheumatic tricuspid valve regurgitation 06/05/2018     Resolved Ambulatory Problems     Diagnosis Date Noted    No Resolved Ambulatory Problems     Past Medical History:   Diagnosis Date    Aortic stenosis     Arthritis     Asthma     Chest pain     Chronic lower back pain     COPD (chronic obstructive pulmonary disease) (HCC)     Esophageal reflux     Hyperlipidemia  Hypertension     Varicose veins of leg with edema, unspecified laterality        Admitting Diagnosis: Transient ischemic attack [G45 9]  Generalized weakness [R53 1]    Assessment/Plan:       * TIA (transient ischemic attack)   Assessment & Plan     Admit telemetry  Consult Neurology  MRI brain pending  Echo pending  Lipid panel hemoglobin A1c pending  Lipitor 40 mg daily, aspirin 325 mg daily          Bilateral leg edema   Assessment & Plan     Continue Lasix             VTE Prophylaxis: Heparin  / sequential compression device       Patient will be admitted on an Inpatient basis with an anticipated length of stay of  more than 2 midnights  Justification for Hospital Stay:  Neurology evaluation     Admission Orders:  [facility] neurology, rehab  PT/OT/speech evals & treat  VS/Neuro cks q 1 hr x4;  q 2 hr x4;  q 4 hrs   Dysphagia assessment prior to po  Carotid doppler study  CT head   MRI head  I/O q shift  Up w/assist     Scheduled Meds:   Current Facility-Administered Medications:  acetaminophen 650 mg Oral Q6H PRN Little Brittany PA-C   aspirin 81 mg Oral Daily Little [place]any, PA-C   atorvastatin 40 mg Oral QPM Carmita Andresa PA-C   furosemide 60 mg Oral Daily Little Brittany PA-C   heparin (porcine) 5,000 Units Subcutaneous Q8H [address] Little Brittany PA-C   ondansetron 4 mg Intravenous Q6H PRN Little Brittany PA-C   potassium chloride 20 mEq Oral Daily Little Brittany PA-C       8/20  INTERNAL MEDICINE  MRI shows acute small infarct  Patient with acute CVA  Family was updated  She is on baby aspirin  Continue with PT/OT    MRI results as below:   Small acute infarct identified within the posterior aspect of the left lentiform nucleus best seen on series 3 image 12   No mass effect or hemorrhagic transformation    Diffuse cerebral volume loss and extensive chronic microangiopathic change within both cerebral hemispheres

## 2018-08-20 NOTE — ASSESSMENT & PLAN NOTE
[facility] Neurology  MRI brain pending  Echo pending  Lipid panel hemoglobin A1c pending  Lipitor 40 mg daily, aspirin 325 mg daily

## 2018-08-20 NOTE — SPEECH THERAPY NOTE
Speech-Language Pathology Bedside Swallow Evaluation      Patient Name: Loly Tripathi    MPOVU'U Date: 8/20/2018       Impression  Pt presents with oral and pharyngeal swallow function WNL  Pt is able to answer questions, follow commands, and express wants, needs and ideas via speech  Per family at b/s, pt appears to be back to baseline mental and cognitive status  (Note that pt is hard of hearing and one of her hearing aids is not currently working )      Recommendations  Continue Regular/Thin Liquid diet  No ST needs at this time  Problem List  Patient Active Problem List   Diagnosis    Shortness of breath on exertion    Bilateral leg edema    Essential hypertension    Hypertensive heart disease without heart failure    Diastolic dysfunction    Mixed hyperlipidemia    Nonrheumatic aortic valve insufficiency    Nonrheumatic mitral valve regurgitation    Non-rheumatic aortic stenosis    Nonrheumatic tricuspid valve regurgitation    Cerebrovascular accident (CVA) due to occlusion of left middle cerebral artery ([address] )    Hiatal hernia    Hypercholesterolemia       Past Medical History  Past Medical History:   Diagnosis Date    Aortic stenosis     Arthritis     Asthma     Chest pain     Chronic lower back pain     COPD (chronic obstructive pulmonary disease) (HCC)     Esophageal reflux     Hyperlipidemia     Hypertension     Varicose veins of leg with edema, unspecified laterality        Past Surgical History  Past Surgical History:   Procedure Laterality Date    APPENDECTOMY      CATARACT EXTRACTION      CHOLECYSTECTOMY             Current Medical Status  Pt is a 80 y o  female who presented to [facility] with small L posterior lentiform nucleus infarct per MRI performed today  She has no prior ST hx within [facility]  At the time of this evaluation, the pt was A+A, pleasant, cooperative, denied complaints   Her dtr-in-law Shant Forte was present for the evaluation and stated that she felt the pt's mental status was at baseline this evening  Imaging Studies:  MRI 8/20 Small acute infarct identified within the posterior aspect of the left lentiform nucleus best seen on series 3 image 12  No mass effect or hemorrhagic transformation  Diffuse cerebral volume loss and extensive chronic microangiopathic change within both cerebral hemispheres  CT Head 8/19 No acute intracranial abnormality  Stable severe microangiopathic changes  CXR 8/19 No acute cardiopulmonary disease  Cardiomegaly  Large hiatal hernia  Swallow Information   Current Risks for Dysphagia & Aspiration: CVA     Current Symptoms/Concerns: none per pt, family, and RN Zak Grandchild; pt passed screen yesterday    Current Diet: regular diet and thin liquids      Baseline Diet: regular diet and thin liquids      Baseline Assessment   Behavior/Cognition: alert, oriented x4    Speech/Language Status: able to participate in conversation and able to follow commands    Patient Positioning: upright in bed    Pain Status/Interventions/Response to Interventions:  No report of or nonverbal indications of pain  Swallow Mechanism Exam   Facial: symmetrical  Labial: WFL  Lingual: WFL  Velum: symmetrical  Mandible: adequate ROM  Dentition: full dentures  Vocal quality: clear/adequate   Volitional Cough: strong/productive   Swallow Mechanics: timely and coordinated hyolaryngeal elevation with dry swallow        Consistencies Assessed and Performance   Consistencies Administered: thin liquids, soft solids and hard solids  Specific materials administered included soda via straw, steamed vegetables, and grilled chicken (cut into pieces by pt's dtr-in-law)  Oral Stage: WFL  Mastication was adequate with the materials administered today  Bolus formation and transfer were functional with no significant oral residue noted  No overt s/sx reduced oral control  Pharyngeal Stage: [facility]  Swallowing initiation was prompt    Laryngeal rise was palpated and judged to be within functional limits  No coughing, throat clearing, change in vocal quality or respiratory status noted today       Esophageal Concerns: hx GERD and hiatal hernia    Risk for Aspiration:  Low    Recommendations: regular diet and thin liquids     Recommended Form of Meds: whole with liquid     Aspiration precautions and compensatory swallowing strategies: upright posture and only feed when fully alert    Results Reviewed with: patient, RN and family       [Impressions/Recommendations located at top]

## 2018-08-20 NOTE — PHYSICAL THERAPY NOTE
Physical Therapy Evaluation     Patient's Name: Nicole Carvajal    Admitting Diagnosis  Transient ischemic attack [G45 9]  Generalized weakness [R53 1]    Problem List  Patient Active Problem List   Diagnosis    Shortness of breath on exertion    Bilateral leg edema    Essential hypertension    Hypertensive heart disease without heart failure    Diastolic dysfunction    Mixed hyperlipidemia    Nonrheumatic aortic valve insufficiency    Nonrheumatic mitral valve regurgitation    Non-rheumatic aortic stenosis    Nonrheumatic tricuspid valve regurgitation    TIA (transient ischemic attack)       Past Medical History  Past Medical History:   Diagnosis Date    Aortic stenosis     Arthritis     Asthma     Chest pain     Chronic lower back pain     COPD (chronic obstructive pulmonary disease) (HCC)     Esophageal reflux     Hyperlipidemia     Hypertension     Varicose veins of leg with edema, unspecified laterality        Past Surgical History  Past Surgical History:   Procedure Laterality Date    APPENDECTOMY      CATARACT EXTRACTION      CHOLECYSTECTOMY          08/20/18 0832   Note Type   Note type Eval only   Pain Assessment   Pain Assessment 0-10   Pain Score 3   Pain Location Chest   Pain Orientation Left; Lower   Pain Descriptors Dull;Aching   Pain Frequency Intermittent   Pain Onset Sudden  (since waking up)   Clinical Progression Not changed   Home Living   Type of [address] [place] [address] Two level;Bed/bath upstairs;Stairs to enter with rails  (3 DANIEL , full flight to 2nd floor (13 steps))   Bathroom Shower/Tub Tub/shower unit   Bathroom Toilet Standard   Bathroom Equipment Grab bars in shower;Grab bars around toilet   [address] Walker;Cane   Additional Comments pt amb's w/ SPC   Prior Function   Level of [place] Needs assistance with ADLs and functional mobility; Needs assistance with IADLs  (dtr-in -law able to S/A as needed)   Lives With [facility] Receives Help From Family  (DIL home w/ pt during day)   ADL Assistance Independent  (daughter in law helps with showers)   IADLs Needs assistance   Falls in the last 6 months 0  ((+) h/o of falls )   Vocational Retired   Restrictions/Precautions   Wells Tati Bearing Precautions Per Order No   Braces or Orthoses (none)   Other Precautions Chair Alarm; Bed Alarm;Multiple lines;Telemetry;O2;Fall Risk;Pain;Hard of hearing  (2 L O2 via NC)   General   Family/Caregiver Present No   Cognition   Arousal/Participation Alert   Orientation Level Oriented to person;Oriented to place   Memory Decreased short term memory;Decreased recall of recent events;Decreased recall of precautions   Following Commands Follows one step commands without difficulty   Comments pt agreeable to PT session   RUE Assessment   RUE Assessment (refer to OT eval for details)   LUE Assessment   LUE Assessment (refer to OT eval for details)   RLE Assessment   RLE Assessment WFL   LLE Assessment   LLE Assessment WFL   Coordination   Movements are Fluid and Coordinated 1   Sensation WFL  (pt denies numbness/tingling)   Light Touch   RLE Light Touch Grossly intact   LLE Light Touch Grossly intact   Bed Mobility   Rolling R 4  Minimal assistance   Additional items Assist x 1;HOB elevated; Bedrails; Increased time required   Supine to Sit 4  Minimal assistance   Additional items Assist x 1;HOB elevated; Bedrails; Increased time required  (posterior LOB)   Transfers   Sit to Stand 4  Minimal assistance   Additional items Assist x 2; Increased time required;Verbal cues  (pt requiring momentum to achieve upright stance x 2 trials)   Stand to Sit 4  Minimal assistance   Additional items Assist x 2;Armrests; Impulsive;Verbal cues   Ambulation/Elevation   Gait pattern Improper Weight shift; Forward Flexion;Decreased foot clearance;Shuffling; Short stride; Step to;Excessively slow   Gait Assistance 4  Minimal assist   Additional items Assist x 2;Verbal cues; Tactile cues Assistive Device Rolling walker   Distance 3' from bed>recliner   Stair Management Assistance Not tested   Balance   Static Sitting Fair +   Dynamic Sitting Fair   Static Standing Fair -   Dynamic Standing Poor +   Ambulatory Poor +   Endurance Deficit   Endurance Deficit Yes   Endurance Deficit Description (+) SOB throughout mobility assessment; BP taken pre mobility - 122/78   Activity Tolerance   Activity Tolerance Patient limited by fatigue   Medical Staff Made Aware CM made aware of PT recs   Nurse Made Aware Yes, RN Kathy Felipe verbalized pt appropriate for PT session   Assessment   Prognosis Good   Problem List Decreased strength;Decreased endurance; Impaired balance;Decreased mobility; Decreased safety awareness; Impaired hearing;Pain   Assessment Pt is 80 y o  female seen for PT evaluation on 8/20/2018 s/p admit to [facility] on 8/19/2018 w/ TIA (transient ischemic attack)- pt presented to ED w/ c/o episode of slurred speech and R hand weakness  PT consulted to assess pt's functional mobility and d/c needs  Order placed for PT eval and tx, w/ up w/ A and activity as tolerated order  Performed at least 2 patient identifiers during session: Name and wristband  Comorbidities affecting pt's physical performance at time of assessment include: bilateral leg edema, arthritis, asthma, COPD, HTN  PTA, pt was independent w/ all functional mobility w/ SPC, ambulates community distances and elevations, ambulates household distances, has 3 DANIEL, lives w/ son and DIL in 2 level home and retired   Personal factors affecting pt at time of IE include: lives in 2 story house, ambulating w/ assistive device, stairs to enter home, inability to ambulate household distances, inability to navigate community distances, inability to navigate level surfaces w/o external assistance, decreased cognition, positive fall history, hearing impairments, decreased initiation and engagement, unable to perform physical activity, limited insight into impairments, inability to perform IADLs and inability to perform ADLs  Please find objective findings from PT assessment regarding body systems outlined above with impairments and limitations including weakness, impaired balance, decreased endurance, pain, decreased activity tolerance, decreased functional mobility tolerance, decreased safety awareness, fall risk and SOB upon exertion, as well as mobility assessment (need for minimal assist w/ all phases of mobility when usually ambulating independently)  The following objective measures performed on IE also reveal limitations: Barthel Index: 40/100 and Modified Christos: 4 (moderate/severe disability)  Pt's clinical presentation is currently unstable/unpredictable seen in pt's presentation of need for input for task focus and mobility technique and ongoing medical assessment  Pt to benefit from continued PT tx to address deficits as defined above and maximize level of functional independent mobility and consistency  From PT/mobility standpoint, recommendation at time of d/c would be STR vs  Home PT w/ 24/7 S/A, pending progress in order to facilitate return to PLOF     Barriers to Discharge Inaccessible home environment  (pt has 24/7 S/A per reports)   Goals   Patient Goals "to return home"   [facility] Expiration Date 08/30/18   Short Term Goal #1 In 7-10 days: Perform all bed mobility tasks independently to decrease caregiver burden, Perform all transfers modified independent to improve independence, Ambulate > 150 ft  with least restrictive assistive device modified independent w/o LOB and w/ normalized gait pattern 100% of the time, Navigate 13 stairs modified independent with unilateral handrail to facilitate return to previous living environment, Increase all balance 1/2 grade to decrease risk for falls, Complete exercise program independently, Tolerate 4 hr OOB to faciliate upright tolerance, Tolerate seated at EOB 25 minutes to facilitate functional task performance and Tolerate standing 5 minutes to facilitate functional task performance   Treatment Day 0   Plan   Treatment/Interventions Functional transfer training;LE strengthening/ROM; Elevations; Therapeutic exercise; Endurance training;Cognitive reorientation;Patient/family training;Equipment eval/education; Bed mobility;Gait training;Spoke to nursing;Spoke to case management;OT   PT Frequency 5x/wk; Weekend  (+1x/weekend)   Recommendation   Recommendation Short-term skilled PT  (vs  HHPT, pending progress)   Equipment Recommended Walker   PT - OK to Discharge Yes  (when medically cleared, if to STR)   Modified Christos Scale   Modified Edwards Scale 4   Barthel Index   Feeding 5   Bathing 0   Grooming Score 0   Dressing Score 5   Bladder Score 10   Bowels Score 10   Toilet Use Score 5   Transfers (Bed/Chair) Score 5   Mobility (Level Surface) Score 0   Stairs Score 0   Barthel Index Score 40         Mary Unger, PT

## 2018-08-20 NOTE — ED PROVIDER NOTES
History  Chief Complaint   Patient presents with    Weakness - Generalized     As per EMS pt son reports pt has been acting different and presents as being weak  Pt denies any complaints at this time  HPI patient is a 66-year-old female son reports patient came out of the bathroom tonight and was unable to walk, apparently son reports he with thought the patient's shoes were somewhat stuck to the floor so he removed issues but she was still unable to walk  Patient's daughter-in-law reports that the patient seemed to be slumped over to the right-hand side  Son reports she thought that his mother might be hypoglycemic so he brought her to the table and had her try to eat  She was unable to hold fork she can't dropping with her right hand  She seemed to be able to use her left hand normally  He reports he seemed confused  Paramedics report that the patient seems somewhat intoxicated  Patient arrives awake and alert  Patient denies any alcohol consumption  She is cogent, knows her name and circumstance  No she lives with her son  She does not remember the event it being home  Denies any chest pain  There is no headache  She denies any weakness at this time  Son reports she seems back to baseline  Past medical history of congestive heart failure, leg edema, valvular disease, no anticoagulants  Family history noncontributory,  Social history, lives with her son, nonsmoker denies alcohol abuse    Prior to Admission Medications   Prescriptions Last Dose Informant Patient Reported? Taking?    Multiple Vitamins-Minerals (CHOICEFUL MULTIVITAMIN) CAPS   Yes Yes   Sig: Take 1 capsule by mouth daily   Potassium Chloride ER 20 MEQ TBCR   Yes Yes   Sig: Take 1 tablet by mouth daily   furosemide (LASIX) 40 mg tablet   No Yes   Sig: Take 1 5 tablets (60 mg total) by mouth daily      Facility-Administered Medications: None       Past Medical History:   Diagnosis Date    Aortic stenosis     Arthritis     Asthma  Chest pain     Chronic lower back pain     COPD (chronic obstructive pulmonary disease) (HCC)     Esophageal reflux     Hyperlipidemia     Hypertension     Varicose veins of leg with edema, unspecified laterality        Past Surgical History:   Procedure Laterality Date    APPENDECTOMY      CATARACT EXTRACTION      CHOLECYSTECTOMY         Family History   Problem Relation Age of Onset    Tuberculosis Mother     Heart attack Father     Hypertension Son     Hyperlipidemia Son     Hypertension Brother     Colon cancer Brother      I have reviewed and agree with the history as documented  Social History   Substance Use Topics    Smoking status: Never Smoker    Smokeless tobacco: Never Used    Alcohol use No        Review of Systems   Constitutional: Negative for diaphoresis, fatigue and fever  HENT: Negative for congestion, ear pain, nosebleeds and sore throat  Eyes: Negative for photophobia, pain, discharge and visual disturbance  Respiratory: Negative for cough, choking, chest tightness, shortness of breath and wheezing  Cardiovascular: Negative for chest pain and palpitations  Gastrointestinal: Negative for abdominal distention, abdominal pain, diarrhea and vomiting  Genitourinary: Negative for dysuria, flank pain and frequency  Musculoskeletal: Negative for back pain, gait problem and joint swelling  Skin: Negative for color change and rash  Neurological: Negative for dizziness, syncope and headaches  Psychiatric/Behavioral: Negative for behavioral problems and confusion  The patient is not nervous/anxious  All other systems reviewed and are negative  Confusion, right-sided weakness now resolved    Physical Exam  Physical Exam   Constitutional: She is oriented to person, place, and time  She appears well-developed and well-nourished  HENT:   Head: Normocephalic     Right Ear: External ear normal    Left Ear: External ear normal    Nose: Nose normal  Mouth/Throat: Oropharynx is clear and moist    Eyes: EOM and lids are normal  Pupils are equal, round, and reactive to light  Neck: Normal range of motion  Neck supple  Cardiovascular: Normal rate, regular rhythm and normal heart sounds  Pulmonary/Chest: Effort normal and breath sounds normal  No respiratory distress  Abdominal: Soft  Bowel sounds are normal    Musculoskeletal: Normal range of motion  She exhibits no deformity  Neurological: She is alert and oriented to person, place, and time  She has normal strength  No cranial nerve deficit or sensory deficit  Coordination abnormal  GCS eye subscore is 4  GCS verbal subscore is 5  GCS motor subscore is 6  Skin: Skin is warm and dry  Psychiatric: She has a normal mood and affect  Nursing note and vitals reviewed     Pulse oximetry 96% on room air adequate oxygenation, there is no hypoxia    Vital Signs  ED Triage Vitals   Temperature Pulse Respirations Blood Pressure SpO2   08/19/18 1944 08/19/18 1944 08/19/18 1944 08/19/18 1944 08/19/18 1944   97 6 °F (36 4 °C) 67 20 139/77 96 %      Temp Source Heart Rate Source Patient Position - Orthostatic VS BP Location FiO2 (%)   08/19/18 1944 08/19/18 1944 08/19/18 1944 08/19/18 1944 --   Oral Monitor Sitting Right arm       Pain Score       08/19/18 2215       Worst Possible Pain           Vitals:    08/19/18 1944 08/19/18 2156   BP: 139/77 (!) 145/11   Pulse: 67 64   Patient Position - Orthostatic VS: Sitting Lying       Visual Acuity  Visual Acuity      Most Recent Value   L Pupil Size (mm)  3   R Pupil Size (mm)  2 [Pt had cataracts surgery in right eye]          ED Medications  Medications   morphine (PF) 4 mg/mL injection 4 mg (4 mg Intravenous Given 8/19/18 2215)   ondansetron (ZOFRAN) injection 4 mg (4 mg Intravenous Given 8/19/18 2215)       Diagnostic Studies  Results Reviewed     Procedure Component Value Units Date/Time    Troponin I [[id number]]     Lab Status:  No result Specimen:  Blood Hepatic function panel [[id number]]  (Abnormal) Collected:  08/19/18 2009    Lab Status:  Final result Specimen:  Blood from Arm, Left Updated:  08/19/18 2043     Total Bilirubin 0 40 mg/dL      Bilirubin, Direct 0 11 mg/dL      Alkaline Phosphatase 103 U/L      AST 22 U/L      ALT 22 U/L      Total Protein 7 4 g/dL      Albumin 3 3 (L) g/dL     BNP [[id number]]  (Abnormal) Collected:  08/19/18 2009    Lab Status:  Final result Specimen:  Blood from Arm, Left Updated:  08/19/18 2043     NT-proBNP 721 (H) pg/mL     Troponin I [[id number]]  (Normal) Collected:  08/19/18 2008    Lab Status:  Final result Specimen:  Blood from Arm, Left Updated:  08/19/18 2039     Troponin I <0 02 ng/mL     Basic metabolic panel [[id number]]  (Abnormal) Collected:  08/19/18 2009    Lab Status:  Final result Specimen:  Blood from Arm, Left Updated:  08/19/18 2037     Sodium 139 mmol/L      Potassium 3 3 (L) mmol/L      Chloride 99 (L) mmol/L      CO2 31 mmol/L      Anion Gap 9 mmol/L      BUN 15 mg/dL      Creatinine 0 86 mg/dL      Glucose 121 mg/dL      Calcium 9 6 mg/dL      eGFR 57 ml/min/1 73sq m     Narrative:         National Kidney Disease Education Program recommendations are as follows:  GFR calculation is accurate only with a steady state creatinine  Chronic Kidney disease less than 60 ml/min/1 73 sq  meters  Kidney failure less than 15 ml/min/1 73 sq  meters      CBC and differential [[id number]] Collected:  08/19/18 2008    Lab Status:  Final result Specimen:  Blood from Arm, Left Updated:  08/19/18 2019     WBC 7 40 Thousand/uL      RBC 4 39 Million/uL      Hemoglobin 13 1 g/dL      Hematocrit 39 9 %      MCV 91 fL      MCH 29 8 pg      MCHC 32 8 g/dL      RDW 14 3 %      MPV 12 2 fL      Platelets 385 Thousands/uL      nRBC 0 /100 WBCs      Neutrophils Relative 52 %      Immat GRANS % 0 %      Lymphocytes Relative 37 %      Monocytes Relative 10 %      Eosinophils Relative 1 %      Basophils Relative 0 %      Neutrophils Absolute 3 77 Thousands/µL      Immature Grans Absolute 0 02 Thousand/uL      Lymphocytes Absolute 2 74 Thousands/µL      Monocytes Absolute 0 74 Thousand/µL      Eosinophils Absolute 0 10 Thousand/µL      Basophils Absolute 0 03 Thousands/µL                  CT head without contrast   Final Result by John Carey MD (08/19 2046)      No acute intracranial abnormality  Stable severe microangiopathic changes  Workstation performed: [id number]         XR chest pa & lateral    (Results Pending)   CTA dissection protocol chest and abdomen    (Results Pending)              Procedures  ECG 12 Lead Documentation  Date/Time: 8/19/2018 8:27 PM  Performed by: Lyric Rivera  Authorized by: Lyric Rivera     Indications / Diagnosis:  Transient weakness  ECG reviewed by me, the ED Provider: yes    Patient location:  ED  Previous ECG:     Previous ECG:  Unavailable  Interpretation:     Interpretation: non-specific    Rate:     ECG rate:  69    ECG rate assessment: normal    Rhythm:     Rhythm: sinus rhythm    Comments:      Normal sinus rhythm nonspecific ST-T wave changes no old EKGs available           Phone Contacts  ED Phone Contact    ED Course           Identification of Seniors at Risk      Most Recent Value   (ISAR) Identification of Seniors at Risk   Before the illness or injury that brought you to the Emergency, did you need someone to help you on a regular basis? 1 Filed at: 08/19/2018 1946   In the last 24 hours, have you needed more help than usual?  0 Filed at: 08/19/2018 1946   Have you been hospitalized for one or more nights during the past 6 months? 0 Filed at: 08/19/2018 1946   In general, do you see well? 1 Filed at: 08/19/2018 1946   In general, do you have serious problems with your memory? 0 Filed at: 08/19/2018 1946   Do you take more than three different medications every day?   1 Filed at: 08/19/2018 1946   ISAR Score  3 Filed at: 08/19/2018 1946              Stroke Assessment     Row Name 08/19/18 3971             NIH Stroke Scale    Interval        Level of Consciousness (1a ) 0      LOC Questions (1b ) 0      LOC Commands (1c ) 0      Best Gaze (2 ) 0      Visual (3 ) 0      Facial Palsy (4 ) 0      Motor Arm, Left (5a ) 0      Motor Arm, Right (5b ) 0      Motor Leg, Left (6a ) 0      Motor Leg, Right (6b ) 0      Limb Ataxia (7 ) 0      Sensory (8 ) 0      Best Language (9 ) 0      Dysarthria (10 ) 0      Extinction and Inattention (11 ) (Formerly Neglect) 0      Total 0             CT showed no acute pathology  Chest x-ray showed increased cardiac silhouette, increased pulmonary markings, consistent with mild congestive heart failure, patient diet pox sick, she appears to be at baseline  patient is NIH score is 0, no sign of CVA at this time no indication for tPA, tPA would be contraindicated  other diagnostic testing showed normal liver functions no sign hepatitis, BNP was minimally elevated at 721 I believe this is patient's baseline, cardiac troponin was negative no sign of cardiac ischemia, patient's electrolytes showed some mild hypokalemia at 3 3, otherwise renal function was normal  White count was normal at 7 4 no sign of inflammation, hemoglobin was normal at 13 no sign of anemia  approximately 10:00 p m  called into the room because the patient developed sudden onset of left chest pain patient reports a fairly sharp left chest pain she is holding her chest, reports as somewhat stabbing  There is no radiation to the back  She reports she is not short of breath  She denies any nausea  Family reports she does have a hiatal hernia, does get pain at home but it is different than this  Patient seems fairly uncomfortable, repeat EKG showed no acute ST-T wave changes there is artifact in lead 2 2  Patient's initial troponin was negative   Because this is new pain will repeat her troponin, may require serial troponins, will CT her chest to rule out intrathoracic pathology such as dissection  CT showed no dissection, large hiatal hernia, discussed with the hospitalist service  [facility] medical decision making 80year-old female apparently episode of leaning to the right side, unable to walk at home followed by right arm weakness and some confusion, now seems completely resolved  Discussed with family most consistent with transient ischemic attack, the patient will require the admission for further evaluation, concerned about developing symptoms again and possibly having a CVA  Due the patient's age I felt evaluation in the hospital was the best option  CritCare Time    Disposition  Final diagnoses:   Transient ischemic attack     Time reflects when diagnosis was documented in both MDM as applicable and the Disposition within this note     Time User Action Codes Description Comment    8/19/2018  9:11 PM Leigh Huertas [G45 9] Transient ischemic attack       ED Disposition     ED Disposition Condition Comment    Admit  Case was discussed with hospitalist service and the patient's admission status was agreed to be 2 midnights the service of Dr Charlotte Garcia         Patient's Medications   Discharge Prescriptions    No medications on file     No discharge procedures on file      ED Provider  Electronically Signed by           Lasha Sánchez MD  08/19/18 [address] Oscar Blackman MD  08/20/18 9859

## 2018-08-20 NOTE — OCCUPATIONAL THERAPY NOTE
Occupational Therapy Evaluation        Patient Name: Mikki Alvarado  LGCGL'W Date: 8/20/2018 08/20/18 9694   Note Type   Note type Eval only   Restrictions/Precautions   Weight Bearing Precautions Per Order No   Braces or Orthoses Other (Comment)  (none reported)   Other Precautions Chair Alarm; Bed Alarm;Multiple lines;Telemetry;O2;Fall Risk;Hard of hearing   Pain Assessment   Pain Assessment 0-10   Pain Score 3   Pain Location Chest   Pain Orientation Left; Lower   Pain Descriptors Aching;Dull   Pain Frequency Intermittent   Clinical Progression Not changed   Home Living   Type of [address] [place] [address] Two level;Bed/bath upstairs  (3 DANIEL , full flight to 2nd floor (13 steps))   Bathroom Shower/Tub Tub/shower unit   Bathroom Toilet Standard   Bathroom Equipment Grab bars in shower;Grab bars around toilet   [address] Walker;Cane   Additional Comments ambulatory with [facility]   Prior Function   Level of [place] Needs assistance with ADLs and functional mobility   Lives With [employer] Help From Family   ADL Assistance Independent  (daughter in law helps with showers)   IADLs Needs assistance   Falls in the last 6 months 0  ((+) h/o of falls )   Vocational Retired   Comments daughter in law is home during the day   Lifestyle   Autonomy Patient reporting independnet with basic self care, transfers and ambulation  Patient lives with her Son and family in a 2 story house, 3 DANIEL and full flight to bedroom  Patient ambulates with SPC, patient has assist and supervision 24 hrs  Reciprocal Relationships Supportive family   Psychosocial   Psychosocial (WDL) WDL   ADL   Eating Assistance 5  Supervision/Setup   Eating Deficit Setup; Increased time to complete   Grooming Assistance 4  Minimal Assistance   UB Bathing Assistance 3  Moderate Assistance   LB Bathing Assistance 3  Moderate Assistance   [address] 3 Moderate Assistance   Toileting Assistance  3  Moderate Assistance   Functional Assistance 3  Moderate Assistance   Bed Mobility   Rolling R 4  Minimal assistance   Additional items Assist x 1;HOB elevated; Increased time required;Verbal cues; Impulsive   Supine to Sit 4  Minimal assistance   Additional items Assist x 1; Increased time required; Impulsive;Verbal cues   Transfers   Sit to Stand 4  Minimal assistance   Additional items Assist x 2; Increased time required;HOB elevated;Verbal cues   Stand to Sit 4  Minimal assistance   Additional items Assist x 2; Increased time required;Verbal cues; Impulsive   Functional Mobility   Functional Mobility 4  Minimal assistance   Additional items Rolling walker   Balance   Static Sitting Fair +   Dynamic Sitting Fair   Static Standing Fair -   Dynamic Standing Poor +   Activity Tolerance   Activity Tolerance Patient limited by fatigue   Nurse Made Aware Yes, JESSICA Ramirez verbalized pt appropriate for OT/PT session   RUE Assessment   RUE Assessment X  (limited sh flexion ~ 70 degrees)   RUE Overall AROM   R Mass Grasp severe arthritic changes, incomplete fist by 1/3 with poor  strength  RUE Strength   RUE Overall Strength Deficits  (2+/5)   LUE Assessment   LUE Assessment X  (limited sh flexion ~ 70 degrees)   LUE Overall AROM   L Mass Grasp severe arthritic changes, incomplete fist by 1/3 with poor  strength  LUE Strength   LUE Overall Strength Deficits  (2+/5)   Hand Function   Gross Motor Coordination Impaired   Fine Motor Coordination Impaired   Sensation   Light Touch No apparent deficits  (BUEs)   Proprioception   Proprioception No apparent deficits  (BUEs)   Vision-Basic Assessment   Current Vision Wears glasses only for reading   Patient Visual Report Other (Comment)  (No significant changes reported)   Cognition   Overall Cognitive Status Impaired   Arousal/Participation Alert; Responsive; Cooperative   Attention Attends with cues to redirect   Orientation Level Oriented to person;Oriented to place; Disoriented to time;Disoriented to situation   Memory Decreased short term memory;Decreased recall of recent events   Following Commands Follows one step commands with increased time or repetition   AssessmentPatient is a 80 y o  female seen for OT evaluation s/p admit to [address] [place] [address] on 8/19/2018 w/TIA (transient ischemic attack)  Patient  presented to ED w/ c/o episode of slurred speech and R hand weakness  Commorbidities affecting patient's functional performance at time of assessment include: bilateral leg edema, arthritis, asthma, COPD, HTN  Orders placed for OT evaluation and treatment  Performed at least two patient identifiers during session including name and wristband  Prior to admission, Patient reporting independnet with basic self care, transfers and ambulation  Patient lives with her Son and family in a 2 story house, 3 DANIEL and full flight to bedroom  Patient ambulates with SPC, patient has assist and supervision 24 hrs  Personal factors affecting patient at time of initial evaluation include: inaccesible home environment, steps to enter, decreased initiation and engagement, difficulty performing ADLs and difficulty performing IADLs  Upon evaluation, patient requires minimal  and moderate assist for UB ADLs, moderate assist for LB ADLs, transfers and functional ambulation in room and bathroom with minimal  assist x 2, with the use of Rolling Walker   Occupational performance is affected by the following deficits: orientation, decreased functional use of BUEs, in hand manipulation deficit with impaired reach, grasp and coordination, limited active ROM, decreased muscle strength, degenerative arthritic joint changes, impaired gross motor coordination, impaired fine motor coordination, dynamic sit/ stand balance deficit with poor standing tolerance time for self care and functional mobility, decreased activity tolerance, impaired problem solving, decreased safety awareness and postural control and postural alignment deficit, requiring external assistance to complete transitional movements  Therapist completed  extensive additional review of medical records and additional review of physical, cognitive or psychosocial history, clinical examination identifying 5 or more performance deficits, clinical decision making of a high complexity , consistent with a high complexity level evaluation  Patient to benefit from continued Occupational Therapy treatment while in the hospital to address deficits as defined above and maximize level of functional independence with ADLs and functional mobility  Occupational Performance areas to address include: bathing/ shower, dressing, toilet hygiene, transfer to all surfaces, functional mobility, emergency response, IADLs: safety procedures, IADLs: meal prep/ clean up, Leisure Participation and Social participation  From OT standpoint, recommendation at time of d/c would be Short Term Rehab  Limitation Decreased ADL status; Decreased UE ROM; Decreased UE strength;Decreased Safe judgement during ADL;Decreased endurance;Decreased cognition;Decreased fine motor control;Decreased self-care trans;Decreased high-level ADLs   Prognosis Good   Goals   Patient Goals "just go back home"   Plan   Treatment Interventions ADL retraining;Functional transfer training;UE strengthening/ROM; Endurance training;Patient/family training;Equipment evaluation/education; Fine motor coordination activities; Compensatory technique education;Continued evaluation; Energy conservation; Activityengagement   Goal Expiration Date 09/03/18   OT Frequency 3-5x/wk   Recommendation   Recommendation Physiatry Consult   OT Discharge Recommendation Short Term Rehab   Equipment Recommended Bedside commode   OT - OK to Discharge Yes  (STR when medically cleared)   Barthel Index   Feeding 5   Bathing 0   Grooming Score 0   Dressing Score 5   Bladder Score 10   Bowels Score 10 Toilet Use Score 5   Transfers (Bed/Chair) Score 5   Mobility (Level Surface) Score 0   Stairs Score 0   Barthel Index Score 40   Modified Christos Scale   Modified Christos Scale 4       Occupational Therapy goals: In 7-14 days:     1- Patient will verbalize and demonstrate use of energy conservation/ deep breathing technique and work simplification skills during functional activity with no verbal cues  2-Patient will verbalize and demonstrate good body mechanics and joint protection techniques during  ADLs/ IADLs with no verbal cues  3- Patient will increase OOB/ sitting tolerance to 2-4 hours per day for increased participation in self care and leisure tasks with no s/s of exertion  4-Patient will increase standing tolerance time to 5  minutes with unilateral UE support to complete sink level ADLs@ mod I level   5- Patient will increase sitting tolerance at edge of bed to 20 minutes to complete UB ADLs @ set up assist level  6- Patient will transfer bed to Chair / toilet at Set up assist level with AD as indicated  7- Patient will complete UB ADLs with set up assist  8- Patient will complete LB ADLs with min assist with the use of adaptive equipment  9- Patient will complete toileting hygiene with set up assist/ supervision for thoroughness    10-Patient/ Family  will demonstrate competency with UE Home Exercise Program

## 2018-08-20 NOTE — PLAN OF CARE
Problem: PHYSICAL THERAPY ADULT  Goal: Performs mobility at highest level of function for planned discharge setting  See evaluation for individualized goals  Treatment/Interventions: Functional transfer training, LE strengthening/ROM, Elevations, Therapeutic exercise, Endurance training, Cognitive reorientation, Patient/family training, Equipment eval/education, Bed mobility, Gait training, Spoke to nursing, Spoke to case management, OT  Equipment Recommended: David Bellamy       See flowsheet documentation for full assessment, interventions and recommendations  Outcome: Progressing  Prognosis: Good  Problem List: Decreased strength, Decreased endurance, Impaired balance, Decreased mobility, Decreased safety awareness, Impaired hearing, Pain  Assessment: Pt seen for PT treatment session this date with interventions consisting of Therapeutic exercise consisting of: AROM 10 reps B LE in sitting position and therapeutic activity consisting of training: sit<>stand transfers, vc and tactile cues for static standing posture faciliation and short gait trial from chair to [facility]  Pt agreeable to PT treatment session upon arrival, pt found seated OOB in recliner, in no apparent distress, A&O x 2 and responsive  Pt requiring maximal vc for task focus and safety, pt impulsive throughout, high fall risk 2/2 such  Pt able to tolerate seated ther ex in pain free range  Pt reporting chronic lower leg cramps, RN aware of such, no worsening of such with short gait trial to/from [facility]  Post session: pt returned back to recliner, chair alarm engaged, all needs in reach and RN notified of session findings/recommendations and NSG staff educated/encouraged to mobilize A of 2 for safety with RW  Continue to recommend STR vs HHPT with family support at time of d/c in order to maximize pt's functional independence and safety w/ mobility   Pt continues to be functioning below baseline level, and remains limited 2* factors listed above and including at risk for falls  PT will continue to see pt while here in order to address the deficits listed above and provide interventions consistent w/ POC in effort to achieve STGs  Barriers to Discharge: Inaccessible home environment     Recommendation: Short-term skilled PT (vs  HHPT, pending progress)     PT - OK to Discharge: Yes (when medically cleared, if to STR)    See flowsheet documentation for full assessment

## 2018-08-20 NOTE — PLAN OF CARE
Problem: OCCUPATIONAL THERAPY ADULT  Goal: Performs self-care activities at highest level of function for planned discharge setting  See evaluation for individualized goals  Treatment Interventions: ADL retraining, Functional transfer training, UE strengthening/ROM, Endurance training, Patient/family training, Equipment evaluation/education, Fine motor coordination activities, Compensatory technique education, Continued evaluation, Energy conservation, Activityengagement  Equipment Recommended: Bedside commode       See flowsheet documentation for full assessment, interventions and recommendations  Limitation: Decreased ADL status, Decreased UE ROM, Decreased UE strength, Decreased Safe judgement during ADL, Decreased endurance, Decreased cognition, Decreased fine motor control, Decreased self-care trans, Decreased high-level ADLs  Prognosis: Good  Assessment: Patient is a 80 y o  female seen for OT evaluation s/p admit to [facility] on 8/19/2018 w/TIA (transient ischemic attack)  Patient  presented to ED w/ c/o episode of slurred speech and R hand weakness  Commorbidities affecting patient's functional performance at time of assessment include: bilateral leg edema, arthritis, asthma, COPD, HTN  Orders placed for OT evaluation and treatment  Performed at least two patient identifiers during session including name and wristband  Prior to admission, Patient reporting independnet with basic self care, transfers and ambulation  Patient lives with her Son and family in a 2 story house, 3 DANIEL and full flight to bedroom  Patient ambulates with SPC, patient has assist and supervision 24 hrs  Personal factors affecting patient at time of initial evaluation include: inaccesible home environment, steps to enter, decreased initiation and engagement, difficulty performing ADLs and difficulty performing IADLs   Upon evaluation, patient requires minimal  and moderate assist for UB ADLs, moderate assist for LB ADLs, transfers and functional ambulation in room and bathroom with minimal  assist x 2, with the use of Rolling Walker  Occupational performance is affected by the following deficits: orientation, decreased functional use of BUEs, in hand manipulation deficit with impaired reach, grasp and coordination, limited active ROM, decreased muscle strength, degenerative arthritic joint changes, impaired gross motor coordination, impaired fine motor coordination, dynamic sit/ stand balance deficit with poor standing tolerance time for self care and functional mobility, decreased activity tolerance, impaired problem solving, decreased safety awareness and postural control and postural alignment deficit, requiring external assistance to complete transitional movements  Therapist completed  extensive additional review of medical records and additional review of physical, cognitive or psychosocial history, clinical examination identifying 5 or more performance deficits, clinical decision making of a high complexity , consistent with a high complexity level evaluation  Patient to benefit from continued Occupational Therapy treatment while in the hospital to address deficits as defined above and maximize level of functional independence with ADLs and functional mobility     Recommendation: Physiatry Consult  OT Discharge Recommendation: Short Term Rehab  OT - OK to Discharge: Yes (STR when medically cleared)

## 2018-08-20 NOTE — CONSULTS
Neurology Consult- Ranjit Trivedi 2/8/1921, 80 y o  female   MRN: [id number] Unit/Bed#: -01 Encounter: [id number]      Inpatient consult to Neurology  Consult performed by: Matti CALDERON ordered by: Isa Quijano          * Cerebrovascular accident (CVA) due to occlusion of left middle cerebral artery ([address] )   Assessment & Plan    A small infarct is noted in the posterior aspect of the left lentiform nucleus on MRI scanning  Patient reports that she is doing quite well today  She has been started on aspirin, low-dose 81 mg as well as a statin  Her carotids are pending her echocardiogram does not note any atrial enlargement and a good ejection fraction  She does have valvular disease  It is certainly likely that this was a small vessel infarct  All of the above was discussed with her daughter in-law at the bedside  We will she her in the office on Parkinson's wrote in approximately 1 month  Hypercholesterolemia   Assessment & Plan    Even though she is at advanced age she would likely benefit from a standard dose statin for the immediate post stroke period  Essential hypertension   Assessment & Plan    It appears as if her hypertension is controlled on just a simple diuretic at home  Reason for Consult / Principal Problem:  Right-sided weakness question TIA  Hx and PE limited by:  None  Review of previous medical records was completed  Family, the patient's daughter-in-law with whom she lives, was present at the bedside for history and examination        HPI: Ranjit Trivedi is a right handed  80 y o  female who neurology is asked to see for a question of a right-sided TIA when she presented yesterday on the 19th in the early evening  Patient's daughter-in-law reports that they had a normal day yesterday she was able to use her right arm affectively in the early part of the day    The patient lives on the 2nd floor and she is able to negotiate this stair case every day independently with her cane  The daughter-in-law reports that they were at dinner last evening when she noticed that her mother in-law was having difficulty getting her arm and hand with her fork up to her mouth  She clearly was able to use her extremity earlier at lunch  When she asked her if she was okay and she got up from the chair at dinner she also noticed that she was having difficulty navigating the short distance to the bathroom and then she was leaning against the wall  She felt as if her mother-in-law's leg was weak  Based on that she was brought to the emergency room via EMS  Overnight both the patient as well as the daughter-in-law feel that she is doing better  ROS: 12 system cued query: The staff nurse reports that she was able to support her weight on her right lower extremity and that they did a stand pivot transfer to the bedside commode  Patient reports that she is using her hand well again  The daughter reports that she did not see any facial droop yesterday at the time of the event and although her arm and leg was weak yesterday she feels that they both appear to be stronger today  The patient denies any headache or visual disturbances  No chest pain palpitations or shortness of breath that is unusual or out of character  She reports that she sometimes is tired when she walks up a flight of stairs but she knows that it is important  No other complaints of lateralizing weakness no numbness and tingling no constitutional symptoms        Historical Information     Past Medical History:   Diagnosis Date    Aortic stenosis     Arthritis     Asthma     Chest pain     Chronic lower back pain     COPD (chronic obstructive pulmonary disease) (HCC)     Esophageal reflux     Hyperlipidemia     Hypertension     Varicose veins of leg with edema, unspecified laterality      Past Surgical History:   Procedure Laterality Date    APPENDECTOMY      CATARACT EXTRACTION      CHOLECYSTECTOMY         Social History  she is  she currently lives with her son and daughter-in-law in the family home  She ambulates independently with a cane  She is able negotiate a full flight of stairs  No smoking alcohol or recreational is  Family History:   Family History   Problem Relation Age of Onset    Tuberculosis Mother     Heart attack Father     Hypertension Son     Hyperlipidemia Son     Hypertension Brother     Colon cancer Brother          Allergies   Allergen Reactions    Codeine      Reaction Date: [id number];      Meds:all current active meds have been reviewed and She is not on any 81 mg aspirin at home she uses only a diuretic and potassium replacement  They would like to keep her meds minimal     Scheduled Meds:  Current Facility-Administered Medications:  acetaminophen 650 mg Oral Q6H PRN   aspirin 81 mg Oral Daily   atorvastatin 40 mg Oral QPM   furosemide 60 mg Oral Daily   heparin (porcine) 5,000 Units Subcutaneous Q8H [facility]   ondansetron 4 mg Intravenous Q6H PRN   potassium chloride 20 mEq Oral Daily     PRN Meds:   acetaminophen    ondansetron      Physical Exam:   Objective   Vitals:Blood pressure 125/62, pulse 62, temperature 97 5 °F (36 4 °C), temperature source Oral, resp  rate 20, height 5' (1 524 m), weight 54 1 kg (119 lb 4 3 oz), SpO2 99 %  ,Body mass index is 23 29 kg/m²  Patient was examined in bed she has just been assisted back to bed from the bedside commode    General: alert, appears stated age and cooperative  Head: Normocephalic, without obvious abnormality, atraumatic  Oral exam: lips, mucosa, and tongue moist;   Neck: no carotid bruit,   Lungs: clear to auscultation ant  bilaterally  Heart: regular rate and rhythm, S1, S2 normal, no murmur appreciated,   Abdomen: soft, +BS    Extremities: atraumatic, no cyanosis or edema    Neurologic:   Mental status: Alert, oriented, thought content appropriate, speech and language are without dysarthria or aphasia  She was able to follow 1 and 2 part commands  She is hard of hearing  CN Exam: NELSY, EOM's I, VF full, Gaze conjugate No sensory or motor lateralizations (No PP on face), Hearing I but decreased B, CNIX-XII I B  Motor:  She has bilateral shoulder limitations premorbidly  There was a slight half a grade power weakness at her right triceps and her hand  Her left have full power, age appropriate she had a again a slight half a grade weakness at the hip on the right compared to the left otherwise the knee flexion extension dorsiflexion great toe extension were all equal and symmetric   Sensory:  Grossly intact  X 4 limbs, 4 mod inc lt, temp, (not PP tested)  Cerebellar:  no ataxia w maneuvers from a seated in bed position, Fine motor & finger taps, age appropriate, WNL  DTR's: Age appropriate, WNL; Plantars: downgoing B  Gait:  She was not gaited at this time  Lab Results:   I have personally reviewed pertinent reports    , CBC:   Results from last 7 days  Lab Units 08/20/18 0438 08/19/18 2008   WBC Thousand/uL 7 56 7 40   RBC Million/uL 4 26 4 39   HEMOGLOBIN g/dL 12 8 13 1   HEMATOCRIT % 38 8 39 9   MCV fL 91 91   PLATELETS Thousands/uL 154 174   , BMP/CMP:   Results from last 7 days  Lab Units 08/20/18 0438 08/19/18 2009   SODIUM mmol/L 140 139   POTASSIUM mmol/L 3 5 3 3*   CHLORIDE mmol/L 103 99*   CO2 mmol/L 31 31   ANION GAP mmol/L 6 9   BUN mg/dL 13 15   CREATININE mg/dL 0 84 0 86   GLUCOSE RANDOM mg/dL 102 121   CALCIUM mg/dL 9 3 9 6   AST U/L  --  22   ALT U/L  --  22   ALK PHOS U/L  --  103   TOTAL PROTEIN g/dL  --  7 4   BILIRUBIN TOTAL mg/dL  --  0 40   EGFR ml/min/1 73sq m 58 57   , Vitamin B12:   , HgBA1C:   Results from last 7 days  Lab Units 08/20/18 0438   HEMOGLOBIN A1C % 6 0   , TSH:   , Coagulation:   , Lipid Profile:   Results from last 7 days  Lab Units 08/20/18 0438   HDL mg/dL 59   LDL CALC mg/dL 196*   TRIGLYCERIDES mg/dL 98        Imaging Studies: I have personally reviewed pertinent films in PACS and On her MRI there is noted a small area of ischemia in the basal ganglia area on the left  It was not noted on the CAT scan  There is a I thought in age-appropriate degree of chronic small vessel disease noted  EEG, Echo, Pathology, and Other Studies: Her echo indicates a generally good cardiac output there is no atrial enlargement, and there is valvular disease of a moderate nature  Counseling / Coordination of Care  Total time spent today 45 minutes  Greater than 50% of total time was spent with the patient and / or family counseling and / or coordination of care  A description of the counseling / coordination of care: All of the above was discussed with the patient and her daughter in-law who is at the bedside     They had several questions and concerns about prevent in the patient's neck stroke and how to keep her mobile as he would like to keep her room on the 2nd floor  She will be following up with us in the office  I believe all of their questions were answered to their satisfaction at this point  Dictation voice to text software has been used in the creation of this document  Please consider this in light of any contextual or grammatical errors

## 2018-08-20 NOTE — CONSULTS
PHYSICAL MEDICINE AND REHABILITATION CONSULT NOTE  Jose Bhardwaj 80 y o  female MRN: [id number]  Unit/Bed#: -01 Encounter: [id number]    Requested by (Physician/Service): Angelito Lara MD  Reason for Consultation:  Assessment of rehabilitation needs  Chief Complaint:  Slurred speech and right hand weakness  History of Present Illness:  Jose Bhardwaj is a 80 y o  female who  has a past medical history of Aortic stenosis; Arthritis; Asthma; Chest pain; Chronic lower back pain; COPD (chronic obstructive pulmonary disease) ([address] ); Esophageal reflux; Hyperlipidemia; Hypertension; and Varicose veins of leg with edema, unspecified laterality  and presented to the [facility] [address] the with episode of slurred speech and right hand weakness  Patient states she had difficulty walking due to leg weakness  She noted that on 8/19 around 6:00 p m  she was walking and then felt as though she could not move the legs very well  Also noted some difficulty in speech which has now resolved  Patient was brought to the hospital for further evaluation  CT of the head revealed no signs of acute infarction  MRI of the brain is pending        Restrictions include:  none    Hospital Course/Comorbidities:   Comorbidities:  As above      Last Weight:    Wt Readings from Last 1 Encounters:   08/20/18 54 1 kg (119 lb 4 3 oz)     Last BMI:  Body mass index is 23 29 kg/m²  Functional History:     Prior to Admission:  Needs assistance with ADLs and functional mobility  Needs assistance with IADLs  Present:  Physical Therapy:  Minimal assistance with bed mobility, transfers sit-to-stand and stand to sit, minimal assistance with ambulation  She ambulated 3 feet from bed to recliner with improper weight shift            Occupational Therapy:, minimal assistance with grooming, moderate assistance with upper body bathing, supervision with eating,, moderate assistance with lower body dressing, minimal assistance with upper body dressing and moderate assistance with toileting  Speech Therapy:  Evaluation is pending             Past Medical History:    Past Medical History:   Diagnosis Date    Aortic stenosis     Arthritis     Asthma     Chest pain     Chronic lower back pain     COPD (chronic obstructive pulmonary disease) (HCC)     Esophageal reflux     Hyperlipidemia     Hypertension     Varicose veins of leg with edema, unspecified laterality         Past Surgical History:    Past Surgical History:   Procedure Laterality Date    APPENDECTOMY      CATARACT EXTRACTION      CHOLECYSTECTOMY          Medications:    Current Facility-Administered Medications:     acetaminophen (TYLENOL) tablet 650 mg, 650 mg, Oral, Q6H PRN, Cassandra Card, PA-C    aspirin chewable tablet 81 mg, 81 mg, Oral, Daily, Cassandra Card, PA-C, 81 mg at 08/20/18 0925    atorvastatin (LIPITOR) tablet 40 mg, 40 mg, Oral, QPM, Cassandra Card, PA-C, 40 mg at 08/20/18 0040    furosemide (LASIX) tablet 60 mg, 60 mg, Oral, Daily, Cassandra Card, PA-C, 60 mg at 08/20/18 0925    heparin (porcine) subcutaneous injection 5,000 Units, 5,000 Units, Subcutaneous, Q8H [facility], 5,000 Units at 08/20/18 0537 **AND** Platelet count, , , Once, Cassandra Card, PA-C    ondansetron [facility]) injection 4 mg, 4 mg, Intravenous, Q6H PRN, Cassandra Card, PA-C, 4 mg at 08/20/18 0926    potassium chloride (K-DUR,KLOR-CON) CR tablet 20 mEq, 20 mEq, Oral, Daily, Cassandra Card, PA-C, 20 mEq at 08/20/18 4916    Allergies: Allergies   Allergen Reactions    Codeine      Reaction Date: 29Apr2011;         Family History:    Family History   Problem Relation Age of Onset    Tuberculosis Mother     Heart attack Father     Hypertension Son     Hyperlipidemia Son     Hypertension Brother     Colon cancer Brother        Social History:    Social History     Social History    Marital status:       Spouse name: N/A    Number of children: N/A  Years of education: N/A     Social History Main Topics    Smoking status: Never Smoker    Smokeless tobacco: Never Used    Alcohol use No    Drug use: No    Sexual activity: No     Other Topics Concern    None     Social History Narrative    None        Ranjit Trivedi lives in a two-level house with 3 steps to enter  Review of systems:  Neurologic:  Positive for right hand weakness and slurred speech  All other systems were reviewed and were negative  Physical Exam:  /62 (BP Location: Right arm)   Pulse 62   Temp 97 5 °F (36 4 °C) (Oral)   Resp 20   Ht 5' (1 524 m)   Wt 54 1 kg (119 lb 4 3 oz)   SpO2 97%   BMI 23 29 kg/m²        Intake/Output Summary (Last 24 hours) at 08/20/18 1221  Last data filed at 08/20/18 0900   Gross per 24 hour   Intake              300 ml   Output              525 ml   Net             -225 ml       Body mass index is 23 29 kg/m²  General:  Not in acute distress  Heent:  Mucous membranes are moist  Pulmonary:  Good air entry bilaterally  Cardiovascular:  RRR  Abdomen: soft, nontender, nondistended, no masses or organomegaly  Skin/Extremity: no rashes, no erythema, no peripheral edema  Neurologic:  No aphasia or dysarthria, follows simple commands, she is hard of hearing, sensations are intact to light touch in all 4 extremities, deep tendon reflexes are age appropriate, within normal limits, plantars are downgoing bilaterally  Psych: Appropriate affect, alert and oriented to person, place and time   Musculoskeletal - Strength:   Right  Left  Site  Right  Left  Site    4+ 5  S Ab: Shoulder Abductors  4+  5  HF: Hip Flexors    5 5  EF: Elbow Flexors  4+  4+ 5/   5  H Ab:  Hip Abductors/   H Ad: Hip Adductors    5  5  EE: Elbow Extensors  5  5  KF: Knee Flexors    5  5  WE: Wrist Extensors  5  5  KE: Knee Extensors    4+ 5  FF: Finger Flexors  5  5  DR: Dorsi Flexors    4+  5  HI: Hand Intrinsics  5  5  PF: Plantar Flexors          Laboratory:    Lab Results Component Value Date    HGB 12 8 08/20/2018    HCT 38 8 08/20/2018    WBC 7 56 08/20/2018      Lab Results   Component Value Date    BUN 13 08/20/2018     08/20/2018    K 3 5 08/20/2018     08/20/2018    GLUCOSE 102 08/20/2018    CREATININE 0 84 08/20/2018      No results found for: PROTIME, INR    Imaging:  Xr Chest Pa & Lateral    Result Date: 8/20/2018  Impression: No acute cardiopulmonary disease  Cardiomegaly  Large hiatal hernia  Workstation performed: [id number]     Ct Head Without Contrast    Result Date: 8/19/2018  Impression: No acute intracranial abnormality  Stable severe microangiopathic changes  Workstation performed: [id number]     Cta Dissection Protocol Chest And Abdomen    Result Date: 8/20/2018  Impression: Mild diffuse aortoiliac and branch vessel calcification  No dissection  Mild ectasia of bilateral common iliac arteries up to 1 4 cm on the right and 1 2 cm on the left and right brachiocephalic artery at 1 2 cm  CTA chest: Dependent hypoventilatory changes in the lower lobes  Otherwise, no acute intrathoracic process or significant interval change  Cardiomegaly  No pericardial effusion  Large grossly stable hiatal hernia  Punctate bilateral lung nodules and 1 cm short axis right paratracheal lymph node unchanged since March 2013 and as such attributed to a benign process  No further surveillance is warranted  CTA abdomen: No acute intra-abdominal process or significant interval change  Colonic diverticulosis  1 1 cm nonobstructing right renal calculus  Findings are consistent with the preliminary report from [facility] which was provided shortly after completion of the exam  Workstation performed: [id number]       Assessment and Recommendations:  59-year-old female admitted with stroke-like symptoms    Impairments:  Impaired functional mobility and ability to perform ADL's  Impaired speech    Recommendations:  - Continue PT/OT/SLP while inpatient      - Pt is unable to safely return home until she is at the supervision/contact-guard functional level  ,  - Based upon patient's current functional level, we recommend referral to acute rehabilitation facility  to allow for achievement of modified-independent functional level      - Disposition unclear at this point in time but inpatient rehab a possibility in the near future pending achievement of clinically stability, potential for functional progress  Thank you for allowing the PM&R service to participate in the care of this patient  We will continue to follow Dawit Leger progress with you   Please do not hesitate to call with questions or concerns    Brianna Fishman MD   Physical Medicine and Rehabilitation

## 2018-08-20 NOTE — H&P
H&P- Isabel Scott 2/8/1921, 80 y o  female MRN: [id number]    Unit/Bed#: ED 13 Encounter: [id number]    Primary Care Provider: Brea Haro MD   Date and time admitted to hospital: 8/19/2018  7:37 PM        * TIA (transient ischemic attack)   Assessment & Plan    Admit telemetry  Consult Neurology  MRI brain pending  Echo pending  Lipid panel hemoglobin A1c pending  Lipitor 40 mg daily, aspirin 325 mg daily        Bilateral leg edema   Assessment & Plan    Continue Lasix          VTE Prophylaxis: Heparin  / sequential compression device   Code Status:  Full  POLST: There is no POLST form on file for this patient (pre-hospital)  Discussion with family:  Family is present for the entire discussion  Anticipated Length of Stay:  Patient will be admitted on an Inpatient basis with an anticipated length of stay of  more than 2 midnights  Justification for Hospital Stay:  Neurology evaluation    Total Time for Visit, including Counseling / Coordination of Care: 30 minutes  Greater than 50% of this total time spent on direct patient counseling and coordination of care  Chief Complaint:   Extremity weakness and slurred speech    History of Present Illness:    Isabel Scott is a 80 y o  female who presents with complaints of an episode of slurred speech and right hand weakness  Patient states she had difficulty walking due to leg weakness  She states around 6:00 p m  she was walking and then felt as though she could not move her legs very well  So the felt like her fever stuck to the floor  Showed so noted right hand weakness  She difficulty speaking but this at all since resolved  Patient denies any chest pain, shortness of breath, fever, chills  Review of Systems:    Review of Systems   Neurological: Positive for speech difficulty and weakness  All other systems reviewed and are negative        Past Medical and Surgical History:     Past Medical History:   Diagnosis Date    Aortic stenosis     Arthritis     Asthma     Chest pain     Chronic lower back pain     COPD (chronic obstructive pulmonary disease) (HCC)     Esophageal reflux     Hyperlipidemia     Hypertension     Varicose veins of leg with edema, unspecified laterality        Past Surgical History:   Procedure Laterality Date    APPENDECTOMY      CATARACT EXTRACTION      CHOLECYSTECTOMY         Meds/Allergies:    Prior to Admission medications    Medication Sig Start Date End Date Taking? Authorizing Provider   furosemide (LASIX) 40 mg tablet Take 1 5 tablets (60 mg total) by mouth daily 6/5/18  Yes Claudetta Reichmann, MD   Multiple Vitamins-Minerals (CHOICEFUL MULTIVITAMIN) CAPS Take 1 capsule by mouth daily   Yes Historical Provider, MD   Potassium Chloride ER 20 MEQ TBCR Take 1 tablet by mouth daily 8/11/14  Yes Historical Provider, MD   Cholecalciferol (VITAMIN D3) 5000 units CAPS Take 1 capsule by mouth daily  8/19/18 Yes Historical Provider, MD   Cyanocobalamin (HM VITAMIN B12 PO) Take 1 tablet by mouth daily  8/19/18 Yes Historical Provider, MD   ipratropium-albuterol (DUO-NEB) 0 5-2 5 mg/3 mL Inhale 8/21/17 8/19/18 Yes Historical Provider, MD   metoprolol succinate (TOPROL-XL) 25 mg 24 hr tablet Take 1 tablet (25 mg total) by mouth daily 6/5/18 8/19/18 Yes Claudetta Reichmann, MD     I have reviewed home medications with patient personally  Allergies: Allergies   Allergen Reactions    Codeine      Reaction Date: 29Apr2011;        Social History:     Marital Status:     Occupation:  Retired  Patient Pre-hospital Living Situation:  Lives at home  Patient Pre-hospital Level of Mobility:  Ambulatory with cane and walker  Patient Pre-hospital Diet Restrictions:  None  Substance Use History:   History   Alcohol Use No     History   Smoking Status    Never Smoker   Smokeless Tobacco    Never Used     History   Drug Use No       Family History:    Family History   Problem Relation Age of Onset    Tuberculosis Mother     Heart attack Father     Hypertension Son     Hyperlipidemia Son     Hypertension Brother     Colon cancer Brother        Physical Exam:     Vitals:   Blood Pressure: (!) 145/11 (08/19/18 2156)  Pulse: 64 (08/19/18 2156)  Temperature: 97 6 °F (36 4 °C) (08/19/18 1944)  Temp Source: Oral (08/19/18 1944)  Respirations: 18 (08/19/18 2156)  Height: 4' 10" (147 3 cm) (08/19/18 1944)  Weight - Scale: 61 9 kg (136 lb 7 4 oz) (08/19/18 1944)  SpO2: 96 % (08/19/18 2156)    Physical Exam   Constitutional: She is oriented to person, place, and time  She appears well-developed and well-nourished  HENT:   Head: Normocephalic and atraumatic  Right Ear: External ear normal    Left Ear: External ear normal    Nose: Nose normal    Mouth/Throat: Oropharynx is clear and moist    Eyes: Conjunctivae and EOM are normal  Pupils are equal, round, and reactive to light  Neck: Normal range of motion  Cardiovascular: Normal rate and regular rhythm  Murmur heard  Pulmonary/Chest: Effort normal and breath sounds normal    Abdominal: Soft  Bowel sounds are normal    Musculoskeletal: She exhibits edema  Neurological: She is alert and oriented to person, place, and time  No cranial nerve deficit  Coordination normal    Skin: Skin is warm and dry  Psychiatric: She has a normal mood and affect  Her behavior is normal  Judgment and thought content normal    Vitals reviewed  Additional Data:     Lab Results: I have personally reviewed pertinent reports          Results from last 7 days  Lab Units 08/19/18 2008   WBC Thousand/uL 7 40   HEMOGLOBIN g/dL 13 1   HEMATOCRIT % 39 9   PLATELETS Thousands/uL 174   NEUTROS PCT % 52   LYMPHS PCT % 37   MONOS PCT % 10   EOS PCT % 1       Results from last 7 days  Lab Units 08/19/18 2009   SODIUM mmol/L 139   POTASSIUM mmol/L 3 3*   CHLORIDE mmol/L 99*   CO2 mmol/L 31   BUN mg/dL 15   CREATININE mg/dL 0 86   CALCIUM mg/dL 9 6   TOTAL PROTEIN g/dL 7 4   BILIRUBIN TOTAL mg/dL 0 40   ALK PHOS U/L 103   ALT U/L 22   AST U/L 22   GLUCOSE RANDOM mg/dL 121                   Imaging: I have personally reviewed pertinent reports  CT head without contrast   Final Result by Tyra Tavarez MD (08/19 2046)      No acute intracranial abnormality  Stable severe microangiopathic changes  Workstation performed: [id number]         XR chest pa & lateral    (Results Pending)   CTA dissection protocol chest and abdomen    (Results Pending)       EKG, Pathology, and Other Studies Reviewed on Admission:   · EKG:  NSR    Allscripts / Epic Records Reviewed: Yes     ** Please Note: This note has been constructed using a voice recognition system   **

## 2018-08-20 NOTE — ASSESSMENT & PLAN NOTE
Even though she is at advanced age she would likely benefit from a standard dose statin for the immediate post stroke period

## 2018-08-21 ENCOUNTER — APPOINTMENT (INPATIENT)
Dept: ULTRASOUND IMAGING | Facility: HOSPITAL | Age: 83
DRG: 065 | End: 2018-08-21
Payer: MEDICARE

## 2018-08-21 LAB
ATRIAL RATE: 61 BPM
ATRIAL RATE: 64 BPM
P AXIS: 57 DEGREES
PR INTERVAL: 150 MS
QRS AXIS: -17 DEGREES
QRS AXIS: 51 DEGREES
QRSD INTERVAL: 100 MS
QRSD INTERVAL: 102 MS
QT INTERVAL: 386 MS
QT INTERVAL: 412 MS
QTC INTERVAL: 398 MS
QTC INTERVAL: 428 MS
T WAVE AXIS: -6 DEGREES
T WAVE AXIS: 116 DEGREES
VENTRICULAR RATE: 64 BPM
VENTRICULAR RATE: 65 BPM

## 2018-08-21 PROCEDURE — 94762 N-INVAS EAR/PLS OXIMTRY CONT: CPT

## 2018-08-21 PROCEDURE — 93010 ELECTROCARDIOGRAM REPORT: CPT | Performed by: INTERNAL MEDICINE

## 2018-08-21 PROCEDURE — 97116 GAIT TRAINING THERAPY: CPT

## 2018-08-21 PROCEDURE — 99232 SBSQ HOSP IP/OBS MODERATE 35: CPT | Performed by: INTERNAL MEDICINE

## 2018-08-21 PROCEDURE — 97110 THERAPEUTIC EXERCISES: CPT

## 2018-08-21 PROCEDURE — 97535 SELF CARE MNGMENT TRAINING: CPT

## 2018-08-21 PROCEDURE — 97530 THERAPEUTIC ACTIVITIES: CPT

## 2018-08-21 PROCEDURE — 93880 EXTRACRANIAL BILAT STUDY: CPT

## 2018-08-21 PROCEDURE — 93005 ELECTROCARDIOGRAM TRACING: CPT

## 2018-08-21 PROCEDURE — 93880 EXTRACRANIAL BILAT STUDY: CPT | Performed by: SURGERY

## 2018-08-21 RX ADMIN — HEPARIN SODIUM 5000 UNITS: 5000 INJECTION, SOLUTION INTRAVENOUS; SUBCUTANEOUS at 06:07

## 2018-08-21 RX ADMIN — ATORVASTATIN CALCIUM 40 MG: 40 TABLET, FILM COATED ORAL at 17:06

## 2018-08-21 RX ADMIN — HEPARIN SODIUM 5000 UNITS: 5000 INJECTION, SOLUTION INTRAVENOUS; SUBCUTANEOUS at 17:00

## 2018-08-21 RX ADMIN — ASPIRIN 81 MG CHEWABLE TABLET 81 MG: 81 TABLET CHEWABLE at 09:22

## 2018-08-21 RX ADMIN — POTASSIUM CHLORIDE 20 MEQ: 1500 TABLET, EXTENDED RELEASE ORAL at 09:22

## 2018-08-21 RX ADMIN — FUROSEMIDE 60 MG: 40 TABLET ORAL at 09:22

## 2018-08-21 RX ADMIN — HEPARIN SODIUM 5000 UNITS: 5000 INJECTION, SOLUTION INTRAVENOUS; SUBCUTANEOUS at 21:49

## 2018-08-21 NOTE — SOCIAL WORK
CM spoke with Osman Calvo, pt's daughter via telephone at [phone]  She asked if CM can speak with pt without brother being around so that pt can make her own decisions  She said that she had POA but believes her brother took it over  She reported that her brother has not been allowing pt to have family interaction  In addition, she reported that pt has had limited stimulation  She stated that she lives in [address] [place] [address]  She stated that there are a lot of grandchildren in that area  She said that she has another sister that lives in [place] as well

## 2018-08-21 NOTE — SOCIAL WORK
Contacted Cristian by telephone and left vm to discuss discharge plans  Met with pt at bedside  Pt alert  Pt reported that she lives at home with her son  She said that he does not allow her to see her daughter in [place] much  Pt reported that there are 13 steps to get to the second floor  She reported that she does well with stairs  She said that she uses a cane as needed  She reported that she is ADL independent  No hx of VNA or SNF  Pt agreeable to [facility]  During this time, pt's son enters the room  He reported that he is legal guardian and POA  He stated that he would like pt to receive Home PT  He said that he is waiting for his wife to come so that they can discuss as a family  Pt's son said that pt uses [address] in [place] as well  Pt's son provides transportation  CM reviewed discharge planning process including the following: identifying help at home, patient preference for discharge planning needs, pharmacy preference, and availability of treatment team to discuss questions or concerns patient and/or family may have regarding understanding medications and recognizing signs and symptoms once discharged  CM also encouraged patient to follow up with all recommended appointments after discharge  Patient advised of importance for patient and family to participate in managing patients medical well being  CM name and role reviewed  Discharge Checklist reviewed and CM will continue to monitor for progress toward discharge goals in nursing and provider rounds

## 2018-08-21 NOTE — PLAN OF CARE
Problem: DISCHARGE PLANNING - CARE MANAGEMENT  Goal: Discharge to post-acute care or home with appropriate resources  INTERVENTIONS:  - Conduct assessment to determine patient/family and health care team treatment goals, and need for post-acute services based on payer coverage, community resources, and patient preferences, and barriers to discharge  - Address psychosocial, clinical, and financial barriers to discharge as identified in assessment in conjunction with the patient/family and health care team  - Arrange appropriate level of post-acute services according to patients   needs and preference and payer coverage in collaboration with the physician and health care team  - Communicate with and update the patient/family, physician, and health care team regarding progress on the discharge plan  - Arrange appropriate transportation to post-acute venues  Outcome: Progressing  Pt and her family will consider STR vs VNA  CM will continue to discuss with pt and her family regarding aftercare

## 2018-08-21 NOTE — PHYSICAL THERAPY NOTE
Physical Therapy Treatment Note       08/21/18 2944   Pain Assessment   Pain Assessment 0-10   Pain Score 3   Pain Type Chronic pain   Pain Location Abdomen   Pain Orientation Left;Upper   Pain Descriptors Aching   Pain Frequency Intermittent   Restrictions/Precautions   Weight Bearing Precautions Per Order No   Braces or Orthoses (none per pt)   Other Precautions Chair Alarm; Bed Alarm;Multiple lines;Telemetry;O2;Fall Risk;Pain;Hard of hearing   General   Chart Reviewed Yes   Response to Previous Treatment Patient with no complaints from previous session  Family/Caregiver Present Yes  (DIL)   Cognition   Overall Cognitive Status Impaired   Arousal/Participation Alert; Responsive; Cooperative   Attention Attends with cues to redirect   Orientation Level Oriented to person;Oriented to place; Disoriented to time;Disoriented to situation   Memory Decreased short term memory;Decreased recall of recent events;Decreased recall of precautions   Following Commands Follows one step commands without difficulty   Comments pt agreeable to PT session   Subjective   Subjective "I am waiting for my lunch"   Bed Mobility   Rolling R Unable to assess  (pt received OOB in recliner)   Additional Comments SpO2 on O2 at rest = 98%, following activity = 96% on O2   Transfers   Sit to Stand 4  Minimal assistance   Additional items Assist x 1; Armrests; Increased time required;Verbal cues  ((+) SOB)   Stand to Sit 4  Minimal assistance   Additional items Assist x 1; Increased time required;Verbal cues   Toilet transfer 3  Moderate assistance   Additional items Assist x 1; Increased time required;Commode;Verbal cues  (BSC over toilet; (+) LOB)   Ambulation/Elevation   Gait pattern Improper Weight shift; Forward Flexion;Decreased foot clearance;Shuffling; Short stride; Step to;Excessively slow  ((+) SOB noted)   Gait Assistance 3  Moderate assist   Additional items Assist x 1;Verbal cues; Tactile cues   Assistive Device Rolling walker   Distance 15' x2   Stair Management Assistance Not tested   Balance   Static Sitting Fair +   Dynamic Sitting Fair   Static Standing Fair -   Dynamic Standing Poor +   Ambulatory Poor +   Endurance Deficit   Endurance Deficit Yes   Activity Tolerance   Activity Tolerance Patient limited by fatigue   Nurse Made Aware Yes, RN Geo Iyer verbalized pt appropriate for PT session   Exercises   Hip Abduction Sitting;10 reps;AROM; Right;Left   Knee AROM Long Arc Quad Sitting;10 reps;AROM; Right;Left   Ankle Pumps Sitting;10 reps;AROM; Right;Left   Marching Sitting;10 reps;AROM; Right;Left   Assessment   Prognosis Good   Problem List Decreased strength;Decreased endurance; Impaired balance;Decreased mobility; Decreased safety awareness; Impaired hearing;Pain   Assessment Pt seen for PT treatment session this date with interventions consisting of gait training w/ emphasis on improving pt's ability to ambulate level surfaces x 2 x 15 ft with mod A provided by therapist with RW, Therapeutic exercise consisting of: AROM 10 reps B LE in sitting position and therapeutic activity consisting of training: sit<>stand transfers, static standing tolerance for 1 minutes w/ B UE support and vc and tactile cues for static standing posture faciliation  Pt agreeable to PT treatment session upon arrival, pt found seated OOB in recliner, in no apparent distress and responsive  In comparison to previous session, pt with improvements in tolerating greater amb distances, however pt w/ continued SOB and fatigue limiting her overall tolerance to activity  Pt w/ fair carryover for LB Renee at this time  Post session: pt returned back to recliner, chair alarm engaged, all needs in reach and RN notified of session findings/recommendations  Continue to recommend STR at time of d/c in order to maximize pt's functional independence and safety w/ mobility  Pt continues to be functioning below baseline level, and remains limited 2* factors listed above   PT will continue to see pt while here in order to address the deficits listed above and provide interventions consistent w/ POC in effort to achieve STGs  Barriers to Discharge Inaccessible home environment   Goals   Patient Goals "to go home"   STG Expiration Date 08/30/18   Short Term Goal #1 STGs remain appropriate   Treatment Day 2   Plan   Treatment/Interventions Functional transfer training;LE strengthening/ROM; Elevations; Therapeutic exercise; Endurance training;Cognitive reorientation;Patient/family training;Equipment eval/education; Bed mobility;Gait training;Spoke to nursing;OT   Progress Slow progress, decreased activity tolerance   PT Frequency 5x/wk; Weekend   Recommendation   Recommendation Rehab consult; Short-term skilled PT   Equipment Recommended Walker   PT - OK to Discharge Yes  (when medically cleared, if to STR)       Corin Hyman, PT    Time of PT treatment session: [id number]

## 2018-08-21 NOTE — PLAN OF CARE
Problem: OCCUPATIONAL THERAPY ADULT  Goal: Performs self-care activities at highest level of function for planned discharge setting  See evaluation for individualized goals  Treatment Interventions: ADL retraining, Functional transfer training, UE strengthening/ROM, Endurance training, Patient/family training, Equipment evaluation/education, Fine motor coordination activities, Compensatory technique education, Continued evaluation, Energy conservation, Activityengagement  Equipment Recommended: Bedside commode       See flowsheet documentation for full assessment, interventions and recommendations  Outcome: Progressing  Limitation: Decreased ADL status, Decreased UE ROM, Decreased UE strength, Decreased Safe judgement during ADL, Decreased endurance, Decreased cognition, Decreased fine motor control, Decreased self-care trans, Decreased high-level ADLs  Prognosis: Good  Assessment: Pt cooperative and agreeable to skilled OT services with focus on improving functional mobility and self care skills  Pt can become easily distracted requiring redirection to remain on-task  Pt seated in recliner  upon start of session  Pt and DIL provided with education on use of AE for LB ADL with pt successfully donning sock using sock aide   Pt trialed LHR with request for additional information on where to obtain items  Therapist provided written information to DIL  Pt performed sit<> stand recliner to RW with Min A and ambulated in room and short distance in hallway using RW  Therapist providing Min A to tyron and RN present for SBA and monitor management  Pt c/o of lower back pain and requested to return to room  No LOB noted  Pt demonstrated increased activity tolerance requiring fewer rest breaks during presented tasks  Pt  educated in safe  mobility technique   No cues needed for hand placement   Pt performance demonstrated good carryover of learned techniques and strategies to facilitate safety during self care and daily routine, however pt continues to demonstrate deficits in the areas of self care and functional mobility  Pt would continue to benefit from skilled OT POC to facilitate return to PLOF     Recommendation: Physiatry Consult  OT Discharge Recommendation: Short Term Rehab (HHA vs STR)  OT - OK to Discharge: Yes (when medically cleared)

## 2018-08-21 NOTE — OCCUPATIONAL THERAPY NOTE
OCCUPATIONAL THERAPY TREATMENT  NOTE         08/21/18 1700   Restrictions/Precautions   Weight Bearing Precautions Per Order No   Other Precautions Chair Alarm; Bed Alarm; Fall Risk;Hard of hearing   Pain Assessment   Pain Assessment No/denies pain   Pain Score No Pain   ADL   Where Assessed (Pt practiced using AE for LB  ADL)   Equipment Provided Reacher;Sock aid;Long-handled shoe horn;Long-handled sponge   Eating Assistance (info provided to DIL  for AE for LB dressing )   Toileting Assistance  Unable to assess   Toileting Deficit Steadying;Verbal cueing;Supervison/safety; Increased time to complete;Grab bar use   Toileting Comments A needed for hygeine    Functional Standing Tolerance   Time 3 mins  (stood chair side using RW with S- approx 3 mins)   Activity stood chair side    Transfers   Sit to Stand 4  Minimal assistance   Additional items Assist x 1; Armrests; Increased time required;Verbal cues   Stand to Sit 4  Minimal assistance   Additional items Assist x 1; Armrests; Increased time required;Verbal cues   Toilet transfer 4  Minimal assistance   Additional items Assist x 1; Increased time required;Standard toilet; Other  (grab bar)   Functional Mobility   Functional Mobility 4  Minimal assistance   Additional Comments A +1 with SBA for monitor    Additional items Rolling walker   Cognition   Overall Cognitive Status Impaired   Arousal/Participation Alert; Responsive; Cooperative   Attention Attends with cues to redirect   Memory Decreased short term memory;Decreased recall of precautions   Following Commands Follows one step commands without difficulty   Assessment   Assessment Pt cooperative and agreeable to skilled OT services with focus on improving functional mobility and self care skills  Pt can become easily distracted requiring redirection to remain on-task  Pt seated in recliner  upon start of session   Pt and DIL provided with education on use of AE for LB ADL with pt successfully donning sock using sock aide   Pt trialed LHR with request for additional information on where to obtain items  Therapist provided written information to DIL  Pt performed sit<> stand recliner to RW with Min A and ambulated in room and short distance in hallway using RW  Therapist providing Min A to tyron and RN present for SBA and monitor management  Pt c/o of lower back pain and requested to return to room  No LOB noted  Pt demonstrated increased activity tolerance requiring fewer rest breaks during presented tasks  Pt  educated in safe  mobility technique   No cues needed for hand placement  Pt performance demonstrated good carryover of learned techniques and strategies to facilitate safety during self care and daily routine, however pt continues to demonstrate deficits in the areas of self care and functional mobility  Pt would continue to benefit from skilled OT POC to facilitate return to PLOF  Plan   Treatment Interventions ADL retraining;Visual perceptual retraining;Functional transfer training;UE strengthening/ROM; Endurance training;Cognitive reorientation;Patient/family training;Equipment evaluation/education; Fine motor coordination activities; Compensatory technique education;Continued evaluation; Energy conservation; Activityengagement   Goal Expiration Date 08/30/18   OT Frequency 3-5x/wk   Recommendation   Recommendation Physiatry Consult   OT Discharge Recommendation Short Term Rehab  (HHA vs STR)   Equipment Recommended Bedside commode   OT - OK to Discharge Yes  (when medically cleared)                                                     DENNIS Hernández/KLAUS

## 2018-08-21 NOTE — PLAN OF CARE
Problem: PHYSICAL THERAPY ADULT  Goal: Performs mobility at highest level of function for planned discharge setting  See evaluation for individualized goals  Treatment/Interventions: Functional transfer training, LE strengthening/ROM, Elevations, Therapeutic exercise, Endurance training, Cognitive reorientation, Patient/family training, Equipment eval/education, Bed mobility, Gait training, Spoke to nursing, Spoke to case management, OT  Equipment Recommended: Christina Nguyen       See flowsheet documentation for full assessment, interventions and recommendations  Outcome: Progressing  Prognosis: Good  Problem List: Decreased strength, Decreased endurance, Impaired balance, Decreased mobility, Decreased safety awareness, Impaired hearing, Pain  Assessment: Pt seen for PT treatment session this date with interventions consisting of gait training w/ emphasis on improving pt's ability to ambulate level surfaces x 2 x 15 ft with mod A provided by therapist with RW, Therapeutic exercise consisting of: AROM 10 reps B LE in sitting position and therapeutic activity consisting of training: sit<>stand transfers, static standing tolerance for 1 minutes w/ B UE support and vc and tactile cues for static standing posture faciliation  Pt agreeable to PT treatment session upon arrival, pt found seated OOB in recliner, in no apparent distress and responsive  In comparison to previous session, pt with improvements in tolerating greater amb distances, however pt w/ continued SOB and fatigue limiting her overall tolerance to activity  Pt w/ fair carryover for LB Renee at this time  Post session: pt returned back to recliner, chair alarm engaged, all needs in reach and RN notified of session findings/recommendations  Continue to recommend STR at time of d/c in order to maximize pt's functional independence and safety w/ mobility  Pt continues to be functioning below baseline level, and remains limited 2* factors listed above   PT will continue to see pt while here in order to address the deficits listed above and provide interventions consistent w/ POC in effort to achieve STGs  Barriers to Discharge: Inaccessible home environment     Recommendation: Rehab consult, Short-term skilled PT     PT - OK to Discharge: Yes (when medically cleared, if to STR)    See flowsheet documentation for full assessment

## 2018-08-21 NOTE — PROGRESS NOTES
[address] Internal Medicine Progress Note  Patient: Enrique Ramon 80 y o  female   MRN: [id number]  PCP: Wai Schuler MD  Unit/Bed#: MS [facility] Encounter: [id number]  Date Of Visit: 18    Assessment:    Principal Problem:    Cerebrovascular accident (CVA) due to occlusion of left middle cerebral artery ([address] )  Active Problems:    Bilateral leg edema    Essential hypertension    Hiatal hernia    Hypercholesterolemia      Plan:  Acute CVA due to occlusion of left middle cerebral artery  MRI of the brain did show small infarct in the posterior aspect of left lentiform nucleus  Patient started on aspirin and statin  Carotid ultrasounds did show less than 50% stenosis bilaterally  Echocardiogram good ejection fraction and no atrial enlargement seen  Continue modification of risk factors including BP control  PT/OT    Hyperlipidemia  Even though she is at advanced age she would likely benefit from statin due to CVA    Hypertension  Continue current regimen      VTE Pharmacologic Prophylaxis:   Pharmacologic: Heparin  Mechanical VTE Prophylaxis in Place: Yes    Patient Centered Rounds: I have performed bedside rounds with nursing staff today  Discussions with Specialists or Other Care Team Provider:  Neurology    Education and Discussions with Family / Patient:  Son and daughter    Time Spent for Care: 30 minutes  More than 50% of total time spent on counseling and coordination of care as described above  Current Length of Stay: 2 day(s)    Current Patient Status: Inpatient   Certification Statement: The patient will continue to require additional inpatient hospital stay due to Placement    Discharge Plan:  Likely to discharge next 24 hr    Code Status: Level 1 - Full Code      Subjective:   Patient seen and examined  Feels better  No complaints at this time      Objective:     Vitals:   Temp (24hrs), Av 9 °F (36 6 °C), Min:97 6 °F (36 4 °C), Max:98 1 °F (36 7 °C)    HR:  [61-67] 67  Resp:  [18-20] 18  BP: (108-144)/(56-74) 108/59  SpO2:  [92 %-99 %] 92 %  Body mass index is 23 29 kg/m²  Input and Output Summary (last 24 hours): Intake/Output Summary (Last 24 hours) at 08/21/18 1557  Last data filed at 08/21/18 1314   Gross per 24 hour   Intake              540 ml   Output              825 ml   Net             -285 ml       Physical Exam:     Elderly female not in acute distress hard-of-hearing present  Mucous membranes are moist  Lungs are clear  Heart sounds regular   Abdomen soft nontender  Extremities no edema  Neurological no gross focal motor sensory deficits seen    Additional Data:     Labs:      Results from last 7 days  Lab Units 08/20/18  0438 08/19/18 2008   WBC Thousand/uL 7 56 7 40   HEMOGLOBIN g/dL 12 8 13 1   HEMATOCRIT % 38 8 39 9   PLATELETS Thousands/uL 154 174   NEUTROS PCT %  --  52   LYMPHS PCT %  --  37   MONOS PCT %  --  10   EOS PCT %  --  1       Results from last 7 days  Lab Units 08/20/18  0438 08/19/18 2009   SODIUM mmol/L 140 139   POTASSIUM mmol/L 3 5 3 3*   CHLORIDE mmol/L 103 99*   CO2 mmol/L 31 31   BUN mg/dL 13 15   CREATININE mg/dL 0 84 0 86   CALCIUM mg/dL 9 3 9 6   TOTAL PROTEIN g/dL  --  7 4   BILIRUBIN TOTAL mg/dL  --  0 40   ALK PHOS U/L  --  103   ALT U/L  --  22   AST U/L  --  22   GLUCOSE RANDOM mg/dL 102 121           * I Have Reviewed All Lab Data Listed Above  * Additional Pertinent Lab Tests Reviewed:  Ping 66 Admission Reviewed    Imaging:    Imaging Reports Reviewed Today Include:  MRI of the brain  Imaging Personally Reviewed by Myself Includes:     Recent Cultures (last 7 days):           Last 24 Hours Medication List:     Current Facility-Administered Medications:  acetaminophen 650 mg Oral Q6H PRN Sarthak Fortune, PA-C   aspirin 81 mg Oral Daily Sarthak Fortune, PA-C   atorvastatin 40 mg Oral QPM Sarthak Fortune, PA-C   furosemide 60 mg Oral Daily Sarthak Fortune, PA-C   heparin (porcine) 5,000 Units Subcutaneous Q8H [facility] Jennifer Fowler PA-C   ondansetron 4 mg Intravenous Q6H PRN Jennifer Fowler PA-C   potassium chloride 20 mEq Oral Daily Jennifer Fowler PA-C        Today, Patient Was Seen By: Carlito Noyola MD    ** Please Note: Dragon 360 Dictation voice to text software may have been used in the creation of this document   **

## 2018-08-22 VITALS
BODY MASS INDEX: 23.42 KG/M2 | HEART RATE: 61 BPM | TEMPERATURE: 97.9 F | SYSTOLIC BLOOD PRESSURE: 98 MMHG | WEIGHT: 119.27 LBS | HEIGHT: 60 IN | OXYGEN SATURATION: 94 % | DIASTOLIC BLOOD PRESSURE: 63 MMHG | RESPIRATION RATE: 18 BRPM

## 2018-08-22 PROCEDURE — 99239 HOSP IP/OBS DSCHRG MGMT >30: CPT | Performed by: INTERNAL MEDICINE

## 2018-08-22 PROCEDURE — 97530 THERAPEUTIC ACTIVITIES: CPT

## 2018-08-22 RX ORDER — ASPIRIN 81 MG/1
81 TABLET, CHEWABLE ORAL DAILY
Qty: 30 TABLET | Refills: 0 | Status: SHIPPED | OUTPATIENT
Start: 2018-08-23 | End: 2019-01-22

## 2018-08-22 RX ORDER — ATORVASTATIN CALCIUM 40 MG/1
40 TABLET, FILM COATED ORAL EVERY EVENING
Qty: 30 TABLET | Refills: 0 | Status: SHIPPED | OUTPATIENT
Start: 2018-08-22 | End: 2018-08-28 | Stop reason: SDUPTHER

## 2018-08-22 RX ORDER — FUROSEMIDE 20 MG/1
60 TABLET ORAL DAILY
Qty: 30 TABLET | Refills: 0 | Status: SHIPPED | OUTPATIENT
Start: 2018-08-23 | End: 2018-08-28 | Stop reason: SDUPTHER

## 2018-08-22 RX ADMIN — FUROSEMIDE 60 MG: 40 TABLET ORAL at 09:23

## 2018-08-22 RX ADMIN — ACETAMINOPHEN 650 MG: 325 TABLET, FILM COATED ORAL at 06:42

## 2018-08-22 RX ADMIN — HEPARIN SODIUM 5000 UNITS: 5000 INJECTION, SOLUTION INTRAVENOUS; SUBCUTANEOUS at 14:19

## 2018-08-22 RX ADMIN — ASPIRIN 81 MG CHEWABLE TABLET 81 MG: 81 TABLET CHEWABLE at 09:23

## 2018-08-22 RX ADMIN — HEPARIN SODIUM 5000 UNITS: 5000 INJECTION, SOLUTION INTRAVENOUS; SUBCUTANEOUS at 06:42

## 2018-08-22 RX ADMIN — POTASSIUM CHLORIDE 20 MEQ: 1500 TABLET, EXTENDED RELEASE ORAL at 09:23

## 2018-08-22 NOTE — PLAN OF CARE
Problem: DISCHARGE PLANNING - CARE MANAGEMENT  Goal: Discharge to post-acute care or home with appropriate resources  INTERVENTIONS:  - Conduct assessment to determine patient/family and health care team treatment goals, and need for post-acute services based on payer coverage, community resources, and patient preferences, and barriers to discharge  - Address psychosocial, clinical, and financial barriers to discharge as identified in assessment in conjunction with the patient/family and health care team  - Arrange appropriate level of post-acute services according to patient's   needs and preference and payer coverage in collaboration with the physician and health care team  - Communicate with and update the patient/family, physician, and health care team regarding progress on the discharge plan  - Arrange appropriate transportation to post-acute venues   Outcome: Completed Date Met: 08/22/18  Kirstin from [facility] reported that she spoke with pt and son  She informed CM that pt and son does not want rehab at this time  CM confirmed with pt's son and he said Home PT is what they want  Referral sent to [facility] accepted  Pt and son agreeable to [facility] cannot provide PT until 12-14 days after discharge  Pt's son agreeable to [facility] and Attending recommends SN as well  SN can be provided by [facility] within 48 hours

## 2018-08-22 NOTE — DISCHARGE SUMMARY
Discharge Summary - [facility]    Patient Information: Danna Schmidt 80 y o  female MRN: [id number]  Unit/Bed#: -01 Encounter: [id number]    Discharging Physician / Practitioner: Kim Cervantes MD  PCP: Vida Roth MD  Admission Date: 8/19/2018  Discharge Date: 08/22/18    Disposition:     Home with VNA Services (Reminder: Complete face to face encounter)    Reason for Admission:  CVA    Discharge Diagnoses:     Principal Problem:    Cerebrovascular accident (CVA) due to occlusion of left middle cerebral artery ([address] )  Active Problems:    Bilateral leg edema    Essential hypertension    Hiatal hernia    Hypercholesterolemia  Resolved Problems:    * No resolved hospital problems  *      Consultations During Hospital Stay:  Neurology  PMR    Procedures Performed:     · none    Significant Findings / Test Results:   MRI of the brain  Small acute infarct identified within the posterior aspect of the left lentiform nucleus best seen on series 3 image 12  No mass effect or hemorrhagic transformation    Diffuse cerebral volume loss and extensive chronic microangiopathic change within both cerebral hemispheres    Carotid vascular study less than50% stenosis bilaterally    Incidental Findings:   · None    Test Results Pending at Discharge (will require follow up): · None     Outpatient Tests Requested:  · None    Complications:  None    Hospital Course:     Danna Schmidt is a 80 y o  female patient with past medical history of hypertension, hyperlipidemia who originally presented to the hospital on 8/19/2018 due to signs of right-sided weakness and slurred speech  CT of the head on admission did not show any evidence of acute abnormality  Subsequent MRI did show evidence of an acute infarct in the left lentiform nucleus  Patient was evaluated by Neurology, started on aspirin and statin for risk factor modification    Patient was also evaluated by PT/OT recommended short-term rehab however patient and the family refused stating that they would like to go home  Patient will have PT/Ot services at home  Condition at Discharge: stable     Discharge Day Visit / Exam:     Subjective:  Patient seen and examined  No complaints at this time she is extremely hard of hearing  Vitals: Blood Pressure: 98/63 (08/22/18 1100)  Pulse: 61 (08/22/18 1100)  Temperature: 97 9 °F (36 6 °C) (08/22/18 1100)  Temp Source: Axillary (08/22/18 1100)  Respirations: 18 (08/22/18 1100)  Height: 5' (152 4 cm) (08/20/18 0000)  Weight - Scale: 54 1 kg (119 lb 4 3 oz) (08/20/18 0000)  SpO2: 94 % (08/22/18 1100)  Exam:   Physical Exam   Constitutional: She is oriented to person, place, and time  She appears well-developed and well-nourished  HENT:   Head: Normocephalic and atraumatic  Eyes: EOM are normal  Pupils are equal, round, and reactive to light  Neck: Normal range of motion  Neck supple  Cardiovascular: Normal rate and regular rhythm  Pulmonary/Chest: Effort normal and breath sounds normal    Abdominal: Soft  Bowel sounds are normal    Musculoskeletal: Normal range of motion  Neurological: She is alert and oriented to person, place, and time  Skin: Skin is warm  Discussion with Family:  Patient's son    Discharge instructions/Information to patient and family:   See after visit summary for information provided to patient and family  Provisions for Follow-Up Care:  See after visit summary for information related to follow-up care and any pertinent home health orders  Planned Readmission: none   Discharge Statement:  I spent 45 minutes discharging the patient  This time was spent on the day of discharge  I had direct contact with the patient on the day of discharge  Greater than 50% of the total time was spent examining patient, answering all patient questions, arranging and discussing plan of care with patient as well as directly providing post-discharge instructions    Additional time then spent on discharge activities  Discharge Medications:  See after visit summary for reconciled discharge medications provided to patient and family        ** Please Note: This note has been constructed using a voice recognition system **

## 2018-08-22 NOTE — OCCUPATIONAL THERAPY NOTE
Occupational Therapy Treatment Note      Renny Huston    8/22/2018    Patient Active Problem List   Diagnosis    Shortness of breath on exertion    Bilateral leg edema    Essential hypertension    Hypertensive heart disease without heart failure    Diastolic dysfunction    Mixed hyperlipidemia    Nonrheumatic aortic valve insufficiency    Nonrheumatic mitral valve regurgitation    Non-rheumatic aortic stenosis    Nonrheumatic tricuspid valve regurgitation    Cerebrovascular accident (CVA) due to occlusion of left middle cerebral artery ([address] )    Hiatal hernia    Hypercholesterolemia       Past Medical History:   Diagnosis Date    Aortic stenosis     Arthritis     Asthma     Chest pain     Chronic lower back pain     COPD (chronic obstructive pulmonary disease) (HCC)     Esophageal reflux     Hyperlipidemia     Hypertension     Varicose veins of leg with edema, unspecified laterality        Past Surgical History:   Procedure Laterality Date    APPENDECTOMY      CATARACT EXTRACTION      CHOLECYSTECTOMY          Pt participated in skilled OT session today addressing the following interventions ADL retraining with proper body mechanics, patient / Family Education, [employer] Conservation Training, Therapeutic Activity  Deep and/or pursed lipped breathing, Safety Awareness, Fall Prevention, Activity tolerance training  Patient agreeable to OT treatment session, upon arrival patient was found alert, responsive  and in no apparent distress  Patient demonstrated ability to follow 1-2 step directions (loudly) and attend to task  Patient requiring edu and training from OT for breath support and act erik training  Pt completed toileting, mobility with RW, and grooming with CG by OT and vcs for safety  Patient continues to be functioning below baseline level, occupational performance remains limited secondary to factors listed above and increased risk for falls and injury   From OT standpoint, recommendation at time of d/c would be STR  Patient to benefit from continued Occupational Therapy treatment while in the hospital to address deficits as defined above and maximize level of functional independence with ADLs and functional mobility      Loreto Sheets MS OTR/L

## 2018-08-22 NOTE — SOCIAL WORK
Met with pt and pt's son at pt's bedside  Pt's son accompanied by wife as well  Pt and family agreeable to STR  Referral sent to [facility]  Pt's son said that they want pt to receive only about 3 days of rehab  CM informed him of typically LOS acute rehab which is about 10-14 days  He said that he would like to speak with someone from [facility]  Kirstin from [facility] made aware and plans to speak with pt and family  Pt's son said that if pt does not go to [facility], pt will go home with Home PT  Received a vm from Naheed Marie, pt's daughter regarding discharge plan  CM contacted her back via telephone and left vm

## 2018-08-22 NOTE — SOCIAL WORK
Met with pt and pt's son to review IMM  Both understanding of Medicare rights  IMM placed in Medical chart  Katelynlima Record, pt's daughter present at this time  She stated that her brother does not allow her to see pt so she came to visit pt  She said that she believes pt is "scared" of pt's brother, Aaron Fajardo Record said that she was able to show pt a picture of pt's grandchildren  She mentioned that she wanted pt to live with her but pt's son will not allow it  She reported that pt's son took POA by allowing pt to sign something she did not know what she was signing

## 2018-08-23 ENCOUNTER — TRANSITIONAL CARE MANAGEMENT (OUTPATIENT)
Dept: FAMILY MEDICINE CLINIC | Facility: CLINIC | Age: 83
End: 2018-08-23

## 2018-08-27 ENCOUNTER — TELEPHONE (OUTPATIENT)
Dept: FAMILY MEDICINE CLINIC | Facility: CLINIC | Age: 83
End: 2018-08-27

## 2018-08-27 NOTE — TELEPHONE ENCOUNTER
[facility]    FYI  They admitted her to there services  She was in hospital last week for CVA is now home with nursing ang therapy, will call later this week for an appt  She is home with family

## 2018-08-28 ENCOUNTER — OFFICE VISIT (OUTPATIENT)
Dept: CARDIOLOGY CLINIC | Facility: CLINIC | Age: 83
End: 2018-08-28
Payer: MEDICARE

## 2018-08-28 VITALS
OXYGEN SATURATION: 90 % | DIASTOLIC BLOOD PRESSURE: 64 MMHG | SYSTOLIC BLOOD PRESSURE: 100 MMHG | BODY MASS INDEX: 25.13 KG/M2 | WEIGHT: 128 LBS | HEIGHT: 60 IN | HEART RATE: 76 BPM

## 2018-08-28 DIAGNOSIS — I35.1 NONRHEUMATIC AORTIC VALVE INSUFFICIENCY: ICD-10-CM

## 2018-08-28 DIAGNOSIS — I11.9 HYPERTENSIVE HEART DISEASE WITHOUT HEART FAILURE: ICD-10-CM

## 2018-08-28 DIAGNOSIS — I35.0 NON-RHEUMATIC AORTIC STENOSIS: ICD-10-CM

## 2018-08-28 DIAGNOSIS — I34.0 NONRHEUMATIC MITRAL VALVE REGURGITATION: ICD-10-CM

## 2018-08-28 DIAGNOSIS — I36.1 NONRHEUMATIC TRICUSPID VALVE REGURGITATION: ICD-10-CM

## 2018-08-28 DIAGNOSIS — I10 ESSENTIAL HYPERTENSION: ICD-10-CM

## 2018-08-28 DIAGNOSIS — I63.512 CEREBROVASCULAR ACCIDENT (CVA) DUE TO OCCLUSION OF LEFT MIDDLE CEREBRAL ARTERY (HCC): ICD-10-CM

## 2018-08-28 DIAGNOSIS — R60.0 BILATERAL LEG EDEMA: ICD-10-CM

## 2018-08-28 DIAGNOSIS — E78.2 MIXED HYPERLIPIDEMIA: ICD-10-CM

## 2018-08-28 DIAGNOSIS — R06.02 SHORTNESS OF BREATH ON EXERTION: Primary | ICD-10-CM

## 2018-08-28 DIAGNOSIS — I51.89 DIASTOLIC DYSFUNCTION: ICD-10-CM

## 2018-08-28 PROCEDURE — 99214 OFFICE O/P EST MOD 30 MIN: CPT | Performed by: INTERNAL MEDICINE

## 2018-08-28 RX ORDER — FUROSEMIDE 20 MG/1
20 TABLET ORAL 3 TIMES DAILY
Qty: 90 TABLET | Refills: 11 | Status: SHIPPED | OUTPATIENT
Start: 2018-08-28 | End: 2018-09-25 | Stop reason: SDUPTHER

## 2018-08-28 RX ORDER — ATORVASTATIN CALCIUM 40 MG/1
40 TABLET, FILM COATED ORAL EVERY EVENING
Qty: 30 TABLET | Refills: 11 | Status: SHIPPED | OUTPATIENT
Start: 2018-08-28 | End: 2019-01-22

## 2018-08-28 RX ORDER — POTASSIUM CHLORIDE 1500 MG/1
1 TABLET, FILM COATED, EXTENDED RELEASE ORAL DAILY
Qty: 30 TABLET | Refills: 11 | Status: SHIPPED | OUTPATIENT
Start: 2018-08-28 | End: 2018-09-25 | Stop reason: SDUPTHER

## 2018-08-28 NOTE — PROGRESS NOTES
CARDIOLOGY OFFICE VISIT  [facility]  [address], [facility] Julisa Mcmillan  Tel: [phone]      NAME: Jayla Devries  AGE: 80 y o  SEX: female  : 1921   MRN: [id number]      Chief Complaint:  Chief Complaint   Patient presents with    Follow-up     HHD w/o HF         History of Present Illness:   Patient comes for follow up  States she recently had a left MCA CVA which has left some weakness in her right side of the body  She continues to be SOB with activity as before  Is being treated for asthma with COPD by her pulmonologist  However, she denies chest pain / pressure, palpitations, lightheadedness, syncope, orthopnea, PND, claudication  Does have constant LE swelling    HTN, HHD, DD -  Has been hypertensive for many years  Taking medications regularly  Denies lightheadedness, headache, medication side effects  HLP -  Has had hyperlipidemia for many years  Taking statin regularly along with diet control  Denies myalgia  PCP closely monitoring the blood work  AS, AR, MR, TR - stable, mild but together might be contributing to her SOB      Past Medical History:  Past Medical History:   Diagnosis Date    Aortic stenosis     Arthritis     Asthma     Chest pain     Chronic lower back pain     COPD (chronic obstructive pulmonary disease) (HCC)     Esophageal reflux     Hyperlipidemia     Hypertension     Varicose veins of leg with edema, unspecified laterality          Past Surgical History:  Past Surgical History:   Procedure Laterality Date    APPENDECTOMY      CATARACT EXTRACTION      CHOLECYSTECTOMY           Family History:  Family History   Problem Relation Age of Onset    Tuberculosis Mother     Heart attack Father     Hypertension Son     Hyperlipidemia Son     Hypertension Brother     Colon cancer Brother          Social History:  Social History     Social History    Marital status:   Spouse name: N/A    Number of children: N/A    Years of education: N/A     Social History Main Topics    Smoking status: Never Smoker    Smokeless tobacco: Never Used    Alcohol use No    Drug use: No    Sexual activity: No     Other Topics Concern    Not on file     Social History Narrative    No narrative on file         Active Problems:  Patient Active Problem List   Diagnosis    Shortness of breath on exertion    Bilateral leg edema    Essential hypertension    Hypertensive heart disease without heart failure    Diastolic dysfunction    Mixed hyperlipidemia    Nonrheumatic aortic valve insufficiency    Nonrheumatic mitral valve regurgitation    Non-rheumatic aortic stenosis    Nonrheumatic tricuspid valve regurgitation    Cerebrovascular accident (CVA) due to occlusion of left middle cerebral artery (HCC)    Hiatal hernia    Hypercholesterolemia         The following portions of the patient's history were reviewed and updated as appropriate: past medical history, past surgical history, past family history,  past social history, current medications, allergies and problem list       Review of Systems:  Constitutional: Denies fever, chills  Eyes: Denies eye redness, eye discharge, double vision  ENT: Denies hearing loss, tinnitus, sneezing, nasal discharge, sore throat   Respiratory: Denies cough, expectoration, hemoptysis   +shortness of breath  Cardiovascular: Denies chest pain, palpitations, orthopnea, PND  +lower extremity swelling  Gastrointestinal: Denies abdominal pain, nausea, vomiting, hematemesis, diarrhea, bloody stools  Genito-Urinary: Denies dysuria, incontinence  Musculoskeletal: Denies back pain, joint pain, muscle pain  Neurologic: Denies confusion, lightheadedness, syncope, headache, focal weakness, sensory changes, seizures  Endocrine: Denies polyuria, polydipsia, temperature intolerance  Allergy and Immunology: Denies hives, insect bite sensitivity  Hematological and Lymphatic: Denies bleeding problems, swollen glands   Psychological: Denies depression, suicidal ideation, anxiety, panic  Dermatological: Denies pruritus, rash, skin lesion changes      Vitals:  Vitals:    08/28/18 1111   BP: 100/64   Pulse: 76   SpO2: 90%       Body mass index is 25 kg/m²  Weight (last 2 days)     Date/Time   Weight    08/28/18 1111  58 1 (128)                Physical Examination:  General:   Short elderly female  Using a cane for support  Patient is not in acute distress  Awake, alert, oriented in time, place and person  Responding to commands  Head: Normocephalic  Atraumatic  Eyes: Nonicteric  ENT: Normal external ear canals  Nares normal, no drainage  Lips and oral mucosa normal  Neck: Supple  JVP not raised  Trachea central  No thyromegaly  Lungs: Bilateral bronchovascular breath sounds with no crackles or rhonchi  Chest wall: Kyphosis  Cardiovascular: RRR  S1 and S2 normal  Grade 3/6 LEONOR at base  Gastrointestinal: Abdomen soft, nontender  No guarding or rigidity  Liver and spleen not palpable  Bowel sounds present  Neurologic: Patient is awake, alert, oriented in time, place and person  Responding to command  Moving all extremities  Integumentary:  No skin rash  Lymphatic: No cervical lymphadenopathy  Back: Symmetric  No CVA tenderness  Extremities: No clubbing, cyanosis   B/L LE edema+      Laboratory Results:  CBC with diff:   Lab Results   Component Value Date    WBC 7 56 08/20/2018    RBC 4 26 08/20/2018    HGB 12 8 08/20/2018    HCT 38 8 08/20/2018    MCV 91 08/20/2018    MCH 30 0 08/20/2018    RDW 14 3 08/20/2018     08/20/2018       CMP:  Lab Results   Component Value Date    CREATININE 0 84 08/20/2018    BUN 13 08/20/2018     08/20/2018    K 3 5 08/20/2018     08/20/2018    CO2 31 08/20/2018    ALKPHOS 103 08/19/2018    ALT 22 08/19/2018    AST 22 08/19/2018    BILIDIR 0 11 08/19/2018       Lab Results   Component Value Date    HGBA1C 6 0 08/20/2018       Lab Results   Component Value Date    TROPONINI 0 02 2018    TROPONINI <0 02 2018       Lipid Profile:   No results found for: CHOL  Lab Results   Component Value Date    HDL 59 2018     Lab Results   Component Value Date    LDLCALC 196 (H) 2018     Lab Results   Component Value Date    TRIG 98 2018       Cardiac testing:   Results for orders placed during the hospital encounter of 18   Echo complete with contrast if indicated    Narrative [address] [place] [address]  [phone]    Transthoracic Echocardiogram  2D, M-mode, Doppler, and Color Doppler    Study date:  20-Aug-2018    Patient: Deonte Osullivan  MR number: [id number]  Account number: [de-identified]  : 1921  Age: 80 years  Gender: Female  Status: Inpatient  Location: Bedside  Height: 60 in  Weight: 119 lb  BP: 103/ 62 mmHg    Indications: TIA, Evaluate for suspected cardiac source of embolism  Assess left ventricular function  Diagnoses: G45 9 - Transient cerebral ischemic attack, unspecified    Sonographer:   [facility] , RDCS  Interpreting Physician:  Jeyson Kelly MD  Referring Physician:  Delmer Simon PA-C  Group:  [facility]    SUMMARY    LEFT VENTRICLE:  Ejection fraction was estimated to be 60 %  There were no regional wall motion abnormalities  Concentric hypertrophy was present  RIGHT VENTRICLE:  Estimated peak pressure was at least 30 mmHg  MITRAL VALVE:  There was mild regurgitation  AORTIC VALVE:  There was mild stenosis  There was mild regurgitation  TRICUSPID VALVE:  There was mild regurgitation  PULMONIC VALVE:  There was trace regurgitation  HISTORY: Symptoms: chest pain  Consistent with transient ischemic attack or stroke  PRIOR HISTORY: Shortness of breath, Hypertensive heart disease, Risk factors: hypertension and hypercholesterolemia  Chronic lung disease      PROCEDURE: The procedure was performed at the bedside  This was a routine study  The transthoracic approach was used  The study included complete 2D imaging, M-mode, complete spectral Doppler, and color Doppler  The heart rate was 52 bpm,  at the start of the study  Images were obtained from the parasternal, apical, subcostal, and suprasternal notch acoustic windows  Image quality was adequate  LEFT VENTRICLE: Size was normal  Ejection fraction was estimated to be 60 %  There were no regional wall motion abnormalities  Concentric hypertrophy was present  DOPPLER: There was an increased relative contribution of atrial contraction  to ventricular filling  RIGHT VENTRICLE: The size was normal  Systolic function was normal  Wall thickness was normal  DOPPLER: Estimated peak pressure was at least 30 mmHg  LEFT ATRIUM: Size was normal     RIGHT ATRIUM: Size was normal     MITRAL VALVE: There was annular calcification  Valve structure was normal  There was normal leaflet separation  DOPPLER: The transmitral velocity was within the normal range  There was no evidence for stenosis  There was mild  regurgitation  AORTIC VALVE: The valve was probably trileaflet  Leaflets exhibited calcification  The valve was not well visualized  DOPPLER: There was mild stenosis  There was mild regurgitation  TRICUSPID VALVE: The valve structure was normal  There was normal leaflet separation  DOPPLER: The transtricuspid velocity was within the normal range  There was no evidence for stenosis  There was mild regurgitation  PULMONIC VALVE: Leaflets exhibited normal thickness, no calcification, and normal cuspal separation  DOPPLER: The transpulmonic velocity was within the normal range  There was trace regurgitation  PERICARDIUM: There was no pericardial effusion  The pericardium was normal in appearance  AORTA: The root exhibited normal size      SYSTEM MEASUREMENT TABLES    2D  %FS: 26 9 %  Ao Diam: 2 7 cm  EDV(Teich): 74 7 ml  EF(Teich): 53 %  ESV(Teich): 35 1 ml  IVSd: 1 3 cm  LA Area: 16 9 cm2  LA Diam: 3 3 cm  LVEDV MOD A4C: 104 ml  LVEF MOD A4C: 52 7 %  LVESV MOD A4C: 49 2 ml  LVIDd: 4 1 cm  LVIDs: 3 cm  LVLd A4C: 6 7 cm  LVLs A4C: 5 7 cm  LVOT Diam: 2 cm  LVPWd: 0 8 cm  RA Area: 14 8 cm2  RVIDd: 3 5 cm  SV MOD A4C: 54 8 ml  SV(Teich): 39 5 ml    CW  AR Dec [place]: 2 m/s2  AR Dec Time: 2479 8 ms  AR PHT: 719 1 ms  AR Vmax: 5 m/s  AR maxP 7 mmHg  AV Env  Ti: 333 7 ms  AV VTI: 52 3 cm  AV Vmax: 2 2 m/s  AV Vmax: 2 4 m/s  AV Vmean: 1 6 m/s  AV maxP 6 mmHg  AV maxP 6 mmHg  AV meanP 2 mmHg  HR: 59 BPM  MV VTI: 37 3 cm  MV Vmax: 1 1 m/s  MV Vmean: 0 7 m/s  MV maxP 9 mmHg  MV meanP 2 mmHg  TR Vmax: 2 8 m/s  TR maxP 5 mmHg    MM  TAPSE: 2 4 cm    PW  NICOLE (VTI): 1 5 cm2  NICOLE Vmax: 1 3 cm2  NICOLE Vmax: 1 4 cm2  E': 0 m/s  E/E': 29 5  LVOT Env  Ti: 338 7 ms  LVOT VTI: 24 6 cm  LVOT Vmax: 1 m/s  LVOT Vmean: 0 7 m/s  LVOT maxP mmHg  LVOT meanP 3 mmHg  LVSV Dopp: 78 7 ml  MV A [place]: 0 7 m/s  MV Dec [place]: 4 5 m/s2  MV DecT: 262 7 ms  MV E Selvin: 1 2 m/s  MV E/A Ratio: 1 7  MV PHT: 76 2 ms  MVA (VTI): 2 1 cm2  MVA By PHT: 2 9 cm2    [facility]    Prepared and electronically signed by    Nila Locke MD  Signed 20-Aug-2018 14:39:45         Medications:    Current Outpatient Prescriptions:     aspirin 81 mg chewable tablet, Chew 1 tablet (81 mg total) daily, Disp: 30 tablet, Rfl: 0    atorvastatin (LIPITOR) 40 mg tablet, Take 1 tablet (40 mg total) by mouth every evening, Disp: 30 tablet, Rfl: 0    furosemide (LASIX) 20 mg tablet, Take 3 tablets (60 mg total) by mouth daily (Patient taking differently: Take 20 mg by mouth daily Take 3 tabs daily to equal 60mg ), Disp: 30 tablet, Rfl: 0    Multiple Vitamins-Minerals (CHOICEFUL MULTIVITAMIN) CAPS, Take 1 capsule by mouth daily, Disp: , Rfl:     Potassium Chloride ER 20 MEQ TBCR, Take 1 tablet by mouth daily, Disp: , Rfl: Allergies: Allergies   Allergen Reactions    Codeine      Reaction Date: [id number];          Assessment and Plan:  1  Shortness of breath on exertion  Likely multifactorial from her asthma with COPD, 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, multivalvular disease     2  Bilateral leg edema  Continue furosemide (20 mg) but increase to 2 + 1 pills daily and potassium  Keep legs elevated while in bed  Low-salt diet      3  Essential hypertension, Hypertensive heart disease without heart failure, Diastolic dysfunction  Continue furosemide     4  Mixed hyperlipidemia  Continue statin and diet control  Her PCP closely monitor the blood work     5  Nonrheumatic aortic valve insufficiency, Nonrheumatic mitral valve regurgitation, Non-rheumatic aortic stenosis, Nonrheumatic tricuspid valve regurgitation  Mild  Stable  Follow-up with serial echocardiograms    Recommend aggressive risk factor modification and therapeutic lifestyle changes  Low-salt, low-calorie, low-fat, low-cholesterol diet with regular exercise and to optimize weight  I will defer the ordering and monitoring of necessity lab studies to you, but I am available and happy to review and manage any of the data at your request in the future  Discussed concepts of atherosclerosis, including signs and symptoms of cardiac disease  Previous studies were reviewed  Safety measures were reviewed  Questions were entertained and answered  Patient was advised to report any problems requiring medical attention  Follow-up with PCP and appropriate specialist and lab work as discussed  Return for follow up visit as scheduled or earlier, if needed  Thank you for allowing me to participate in the care and evaluation of your patient  Should you have any questions, please feel free to contact me        Chayo Claros MD  8/28/2018,12:21 PM

## 2018-09-07 ENCOUNTER — TELEPHONE (OUTPATIENT)
Dept: CARDIOLOGY CLINIC | Facility: CLINIC | Age: 83
End: 2018-09-07

## 2018-09-07 NOTE — TELEPHONE ENCOUNTER
RAMON FROM [facility] SAID PT TOLD HER THAT DR ELIZABETH VERBALLY TOLD PT TO TAKE 2 ASPIRIN A DAY WHICH IS CAUSING NOSE BLEEDS  RAMON WANTS TO KNOW IF PT CAN TAKE ONE ASPIRIN A DAY  PLEASE CALL RAMON TO CONFIRM   [address] 71

## 2018-09-13 ENCOUNTER — TELEPHONE (OUTPATIENT)
Dept: CARDIOLOGY CLINIC | Facility: CLINIC | Age: 83
End: 2018-09-13

## 2018-09-13 NOTE — TELEPHONE ENCOUNTER
Dizziness likely not due to lipitor, but please schedule patient to see Dr Brandy Ramírez at nearest availability  There could be other causes for dizziness

## 2018-09-13 NOTE — TELEPHONE ENCOUNTER
Elroy Resendiz called patient's nurse & stated that patient is has increased dizziness & is having a hard time functioning  Patient family is wondering if it's  the Lipitor? Elroy Resendiz also would like to know would Dr Cameron Arredondo consider giving patient something as needed for the dizziness   Please call back at [phone]

## 2018-09-19 ENCOUNTER — TELEPHONE (OUTPATIENT)
Dept: CARDIOLOGY CLINIC | Facility: CLINIC | Age: 83
End: 2018-09-19

## 2018-09-19 NOTE — TELEPHONE ENCOUNTER
PT'S BP IS 97/60 & IS COMPLAINING OF DIZZINESS & TIRED   SPOKE TO JONAH & SHE SAID TO TRANSFER THE CALL TO HER

## 2018-09-19 NOTE — TELEPHONE ENCOUNTER
S/w Pt's Physical Therapist Sarita Rajput, states that her BP is 97/60 sitting and 90/60 standing and that pt is complaining of dizziness and being tired  She has an sophie with you on 9/25/18  If there is any new orders, please call pt and Sarita Rajput at [phone]  Please advise

## 2018-09-19 NOTE — TELEPHONE ENCOUNTER
S/w Bibi and informed her that per AS that furosemide and potassium discontinued  Bibi stated she will inform pt's family

## 2018-09-25 ENCOUNTER — OFFICE VISIT (OUTPATIENT)
Dept: CARDIOLOGY CLINIC | Facility: CLINIC | Age: 83
End: 2018-09-25
Payer: MEDICARE

## 2018-09-25 VITALS
BODY MASS INDEX: 25.6 KG/M2 | OXYGEN SATURATION: 97 % | DIASTOLIC BLOOD PRESSURE: 70 MMHG | HEART RATE: 65 BPM | HEIGHT: 60 IN | SYSTOLIC BLOOD PRESSURE: 90 MMHG | WEIGHT: 130.4 LBS

## 2018-09-25 DIAGNOSIS — I10 ESSENTIAL HYPERTENSION: ICD-10-CM

## 2018-09-25 DIAGNOSIS — R60.0 BILATERAL LEG EDEMA: ICD-10-CM

## 2018-09-25 DIAGNOSIS — R06.02 SHORTNESS OF BREATH ON EXERTION: Primary | ICD-10-CM

## 2018-09-25 DIAGNOSIS — I34.0 NONRHEUMATIC MITRAL VALVE REGURGITATION: ICD-10-CM

## 2018-09-25 DIAGNOSIS — I36.1 NONRHEUMATIC TRICUSPID VALVE REGURGITATION: ICD-10-CM

## 2018-09-25 DIAGNOSIS — I63.512 CEREBROVASCULAR ACCIDENT (CVA) DUE TO OCCLUSION OF LEFT MIDDLE CEREBRAL ARTERY (HCC): ICD-10-CM

## 2018-09-25 DIAGNOSIS — I35.1 NONRHEUMATIC AORTIC VALVE INSUFFICIENCY: ICD-10-CM

## 2018-09-25 DIAGNOSIS — I35.0 NON-RHEUMATIC AORTIC STENOSIS: ICD-10-CM

## 2018-09-25 DIAGNOSIS — I51.89 DIASTOLIC DYSFUNCTION: ICD-10-CM

## 2018-09-25 DIAGNOSIS — E78.2 MIXED HYPERLIPIDEMIA: ICD-10-CM

## 2018-09-25 DIAGNOSIS — I11.9 HYPERTENSIVE HEART DISEASE WITHOUT HEART FAILURE: ICD-10-CM

## 2018-09-25 DIAGNOSIS — R42 DIZZINESS: ICD-10-CM

## 2018-09-25 PROCEDURE — 99214 OFFICE O/P EST MOD 30 MIN: CPT | Performed by: INTERNAL MEDICINE

## 2018-09-25 RX ORDER — POTASSIUM CHLORIDE 1500 MG/1
1 TABLET, FILM COATED, EXTENDED RELEASE ORAL DAILY
Qty: 30 TABLET | Refills: 0 | Status: SHIPPED | OUTPATIENT
Start: 2018-09-25 | End: 2019-08-01 | Stop reason: SDUPTHER

## 2018-09-25 RX ORDER — FUROSEMIDE 20 MG/1
40 TABLET ORAL DAILY
Qty: 90 TABLET | Refills: 0 | Status: SHIPPED | OUTPATIENT
Start: 2018-09-25 | End: 2019-08-01 | Stop reason: SDUPTHER

## 2018-09-25 RX ORDER — MECLIZINE HCL 12.5 MG/1
12.5 TABLET ORAL EVERY 8 HOURS SCHEDULED
Qty: 90 TABLET | Refills: 3 | Status: SHIPPED | OUTPATIENT
Start: 2018-09-25 | End: 2019-09-19

## 2018-09-25 RX ORDER — SULFAMETHOXAZOLE AND TRIMETHOPRIM 800; 160 MG/1; MG/1
1 TABLET ORAL EVERY 12 HOURS SCHEDULED
Qty: 14 TABLET | Refills: 0 | Status: SHIPPED | OUTPATIENT
Start: 2018-09-25 | End: 2018-10-02

## 2018-09-25 NOTE — PROGRESS NOTES
2014 [facility]  [facility] [address]  Cardiology Consultation     Bárbara Norris  [id number]  2/8/1921      1  Shortness of breath on exertion     2  Bilateral leg edema     3  Essential hypertension     4  Mixed hyperlipidemia     5  Hypertensive heart disease without heart failure     6  Cerebrovascular accident (CVA) due to occlusion of left middle cerebral artery [facility])         Chief Complaint   Patient presents with    Follow-up     a lot of dizzyness        HPI:  Patient with history of recent CVA, HTN, hypertensive heart disease, HLD presents with worsening SOB with minimal exertion, bilateral lower extremity edema, and dizziness / vertigo  States that she has COPD but SOB has been worsening, on minimal exertion  She has worsening lower extremity edema since her diuretic was stopped  She also notes dizziness described as "room spinning"  HTN, HHD, DD -  Has been hypertensive for many years  Taking medications regularly  Denies headache, medication side effects        HLP -  Has had hyperlipidemia for many years  Taking statin regularly along with diet control  Denies myalgia    PCP closely monitoring the blood work      AS, AR, MR, TR - stable, mild but together might be contributing to her SOB      Patient Active Problem List   Diagnosis    Shortness of breath on exertion    Bilateral leg edema    Essential hypertension    Hypertensive heart disease without heart failure    Diastolic dysfunction    Mixed hyperlipidemia    Nonrheumatic aortic valve insufficiency    Nonrheumatic mitral valve regurgitation    Non-rheumatic aortic stenosis    Nonrheumatic tricuspid valve regurgitation    Cerebrovascular accident (CVA) due to occlusion of left middle cerebral artery ([address] )    Hiatal hernia    Hypercholesterolemia     Past Medical History:   Diagnosis Date    Aortic stenosis     Arthritis     Asthma     Chest pain     Chronic lower back pain     COPD (chronic obstructive pulmonary disease) (HCC)     Esophageal reflux     Hyperlipidemia     Hypertension     Varicose veins of leg with edema, unspecified laterality      Social History     Social History    Marital status:      Spouse name: N/A    Number of children: N/A    Years of education: N/A     Occupational History    Not on file       Social History Main Topics    Smoking status: Never Smoker    Smokeless tobacco: Never Used    Alcohol use No    Drug use: No    Sexual activity: No     Other Topics Concern    Not on file     Social History Narrative    No narrative on file      Family History   Problem Relation Age of Onset    Tuberculosis Mother     Heart attack Father     Hypertension Son     Hyperlipidemia Son     Hypertension Brother     Colon cancer Brother      Past Surgical History:   Procedure Laterality Date    APPENDECTOMY      CATARACT EXTRACTION      CHOLECYSTECTOMY         Current Outpatient Prescriptions:     aspirin 81 mg chewable tablet, Chew 1 tablet (81 mg total) daily, Disp: 30 tablet, Rfl: 0    atorvastatin (LIPITOR) 40 mg tablet, Take 1 tablet (40 mg total) by mouth every evening, Disp: 30 tablet, Rfl: 11    furosemide (LASIX) 20 mg tablet, Take 1 tablet (20 mg total) by mouth 3 (three) times a day Take 3 tabs daily to equal 60mg, Disp: 90 tablet, Rfl: 11    Multiple Vitamins-Minerals (CHOICEFUL MULTIVITAMIN) CAPS, Take 1 capsule by mouth daily, Disp: , Rfl:     Potassium Chloride ER 20 MEQ TBCR, Take 1 tablet (20 mEq total) by mouth daily, Disp: 30 tablet, Rfl: 11  Allergies   Allergen Reactions    Codeine      Reaction Date: 29Apr2011;      Vitals:    09/25/18 1351   BP: 120/82   BP Location: Left arm   Patient Position: Sitting   Cuff Size: Adult   Pulse: 65   SpO2: 97%   Weight: 59 1 kg (130 lb 6 4 oz)   Height: 5' (1 524 m)       Labs:  Admission on 08/19/2018, Discharged on 08/22/2018   Component Date Value    WBC 08/19/2018 7 40     RBC 08/19/2018 4 39     Hemoglobin 08/19/2018 13 1     Hematocrit 08/19/2018 39 9     MCV 08/19/2018 91     MCH 08/19/2018 29 8     MCHC 08/19/2018 32 8     RDW 08/19/2018 14 3     MPV 08/19/2018 12 2     Platelets 14/48/5527 174     nRBC 08/19/2018 0     Neutrophils Relative 08/19/2018 52     Immat GRANS % 08/19/2018 0     Lymphocytes Relative 08/19/2018 37     Monocytes Relative 08/19/2018 10     Eosinophils Relative 08/19/2018 1     Basophils Relative 08/19/2018 0     Neutrophils Absolute 08/19/2018 3 77     Immature Grans Absolute 08/19/2018 0 02     Lymphocytes Absolute 08/19/2018 2 74     Monocytes Absolute 08/19/2018 0 74     Eosinophils Absolute 08/19/2018 0 10     Basophils Absolute 08/19/2018 0 03     Sodium 08/19/2018 139     Potassium 08/19/2018 3 3*    Chloride 08/19/2018 99*    CO2 08/19/2018 31     ANION GAP 08/19/2018 9     BUN 08/19/2018 15     Creatinine 08/19/2018 0 86     Glucose 08/19/2018 121     Calcium 08/19/2018 9 6     eGFR 08/19/2018 57     Troponin I 08/19/2018 <0 02     Total Bilirubin 08/19/2018 0 40     Bilirubin, Direct 08/19/2018 0 11     Alkaline Phosphatase 08/19/2018 103     AST 08/19/2018 22     ALT 08/19/2018 22     Total Protein 08/19/2018 7 4     Albumin 08/19/2018 3 3*    NT-proBNP 08/19/2018 721*    Troponin I 08/19/2018 0 02     Ventricular Rate 08/19/2018 65     Atrial Rate 08/19/2018 65     MI Interval 08/19/2018 164     QRSD Interval 08/19/2018 98     QT Interval 08/19/2018 346     QTC Interval 08/19/2018 359     P Axis 08/19/2018 20     QRS Axis 08/19/2018 42     T Wave Axis 08/19/2018 33     Ventricular Rate 08/19/2018 63     Atrial Rate 08/19/2018 63     MI Interval 08/19/2018 148     QRSD Interval 08/19/2018 126     QT Interval 08/19/2018 438     QTC Interval 08/19/2018 448     P Axis 08/19/2018 27     QRS Axis 08/19/2018 46     T Wave Axis 08/19/2018 21     Ventricular Rate 08/19/2018 69     Atrial Rate 08/19/2018 69     MA Interval 08/19/2018 156     QRSD Interval 08/19/2018 102     QT Interval 08/19/2018 412     QTC Interval 08/19/2018 441     P Axis 08/19/2018 24     QRS Axis 08/19/2018 36     T Wave Axis 08/19/2018 -18     Cholesterol 08/20/2018 275*    Triglycerides 08/20/2018 98     HDL, Direct 08/20/2018 59     LDL Calculated 08/20/2018 196*    Hemoglobin A1C 08/20/2018 6 0     EAG 08/20/2018 126     Sodium 08/20/2018 140     Potassium 08/20/2018 3 5     Chloride 08/20/2018 103     CO2 08/20/2018 31     ANION GAP 08/20/2018 6     BUN 08/20/2018 13     Creatinine 08/20/2018 0 84     Glucose 08/20/2018 102     Calcium 08/20/2018 9 3     eGFR 08/20/2018 58     WBC 08/20/2018 7 56     RBC 08/20/2018 4 26     Hemoglobin 08/20/2018 12 8     Hematocrit 08/20/2018 38 8     MCV 08/20/2018 91     MCH 08/20/2018 30 0     MCHC 08/20/2018 33 0     RDW 08/20/2018 14 3     Platelets 33/88/8323 154     MPV 08/20/2018 12 1     Color, UA 08/20/2018 Yellow     Clarity, UA 08/20/2018 Clear     Specific Gravity, UA 08/20/2018 1 010     pH, UA 08/20/2018 7 0     Leukocytes, UA 08/20/2018 Trace*    Nitrite, UA 08/20/2018 Negative     Protein, UA 08/20/2018 Negative     Glucose, UA 08/20/2018 Negative     Ketones, UA 08/20/2018 Negative     Urobilinogen, UA 08/20/2018 0 2     Bilirubin, UA 08/20/2018 Negative     Blood, UA 08/20/2018 Negative     RBC, UA 08/20/2018 None Seen     WBC, UA 08/20/2018 0-1*    Epithelial Cells 08/20/2018 None Seen     Bacteria, UA 08/20/2018 None Seen     Ventricular Rate 08/21/2018 64     Atrial Rate 08/21/2018 64     MA Interval 08/21/2018 150     QRSD Interval 08/21/2018 100     QT Interval 08/21/2018 386     QTC Interval 08/21/2018 398     P Axis 08/21/2018 57     QRS [place] 08/21/2018 -17     T Wave Axis 08/21/2018 116     Ventricular Rate 08/19/2018 65     Atrial Rate 08/19/2018 61     QRSD Interval 08/19/2018 102     QT Interval 08/19/2018 412     QTC Interval 08/19/2018 428     QRS Axis 08/19/2018 51     T Wave Axis 08/19/2018 -6      Imaging: No results found  Review of Systems:  Review of Systems   Constitutional: Negative for diaphoresis, fatigue and fever  HENT: Negative for congestion, ear discharge, hearing loss, sinus pain and sore throat  Eyes: Negative for pain, redness and visual disturbance  Respiratory: Positive for shortness of breath  Negative for cough, chest tightness and wheezing  Cardiovascular: Positive for leg swelling  Negative for chest pain and palpitations  Gastrointestinal: Negative for abdominal distention, abdominal pain, constipation, diarrhea, nausea and vomiting  Endocrine: Negative for cold intolerance and heat intolerance  Genitourinary: Negative for difficulty urinating and hematuria  Musculoskeletal: Negative for arthralgias, back pain, gait problem, joint swelling, myalgias, neck pain and neck stiffness  Skin: Negative for color change, pallor, rash and wound  Neurological: Positive for dizziness and light-headedness  Negative for syncope, weakness, numbness and headaches  Hematological: Does not bruise/bleed easily  Psychiatric/Behavioral: Negative for agitation, behavioral problems and confusion  The patient is not nervous/anxious  /82 (BP Location: Left arm, Patient Position: Sitting, Cuff Size: Adult)   Pulse 65   Ht 5' (1 524 m)   Wt 59 1 kg (130 lb 6 4 oz)   SpO2 97%   BMI 25 47 kg/m²     Physical Exam:  Physical Exam   Constitutional: She is oriented to person, place, and time  She appears well-developed and well-nourished  HENT:   Head: Normocephalic and atraumatic  Eyes: Conjunctivae and EOM are normal  Pupils are equal, round, and reactive to light  Neck: Normal range of motion  Neck supple  Cardiovascular: Normal rate, regular rhythm and intact distal pulses  Exam reveals no gallop and no friction rub  Murmur heard    Pulmonary/Chest: Effort normal and breath sounds normal  No respiratory distress  She has no wheezes  She has no rales  She exhibits no tenderness  Abdominal: Soft  Bowel sounds are normal  She exhibits no distension  There is no rebound  Musculoskeletal: Normal range of motion  She exhibits edema  +2 edema   Neurological: She is alert and oriented to person, place, and time  She has normal reflexes  Skin: Skin is warm and dry  Psychiatric: She has a normal mood and affect  Her behavior is normal  Judgment and thought content normal        Discussion/Summary:  1  SOB on exertion:  -Likely multifactorial due to asthma, COPD, 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, multivalvular disease  -ECHO Aug 2018 shows EF 60%, no RWMA, estimated peak pressure at least 30 mmHg, mild MR, mild AS, mild AR, mild TR, trace SC   -Will start lasix 40 mg daily    2  Dizziness / Vertigo:  -ECHO Aug 2018 shows EF 60%, no RWMA, estimated peak pressure at least 30 mmHg, mild MR< mild AS< mild AR, mild TR, trace SC  -Will start meclizine 12 5 mg TID  3  HTN, hypertensive heart disease, diastolic dysfunction:  ECHO findings as above  BP stable  4  HLD: Continue statin    5  AR/MR/AS/TR:   ECHO Aug 2018 shows EF 60%, no RWMA, estimated peak pressure at least 30 mmHg, ild MR< mild AS< mild AR, mild TR, trace SC  Recommend aggressive risk factor modification and therapeutic lifestyle changes  Low salt, low calorie, low fat, low cholesterol diet  Discussed concepts of atherosclerosis, including signs and symptoms of cardiac disease  Previous studies were reviewed  Safety measures were reviewed  Questions were entertained and answered  Patient was advised to report any problem requiring medical attention  Follow up with appropriate specialists and lab work as discussed  Return for follow up visit as scheduled or earlier, if needed  Thank you for allowing me to participate in the care and evaluation of your patient   Should you have any questions, please feel free to contact me

## 2018-10-09 ENCOUNTER — TELEPHONE (OUTPATIENT)
Dept: FAMILY MEDICINE CLINIC | Facility: CLINIC | Age: 83
End: 2018-10-09

## 2018-10-09 NOTE — TELEPHONE ENCOUNTER
Brenda Marie called and said she was being discharged today as she met her goals  Will have nursing for another week or so  Can call back if need to

## 2018-10-19 ENCOUNTER — TELEPHONE (OUTPATIENT)
Dept: FAMILY MEDICINE CLINIC | Facility: CLINIC | Age: 83
End: 2018-10-19

## 2018-10-19 NOTE — TELEPHONE ENCOUNTER
[facility]:   Rohith Jacobs was discharged from home services - all goals have been met, any questions call her [phone]

## 2019-01-22 ENCOUNTER — OFFICE VISIT (OUTPATIENT)
Dept: CARDIOLOGY CLINIC | Facility: CLINIC | Age: 84
End: 2019-01-22
Payer: MEDICARE

## 2019-01-22 VITALS
OXYGEN SATURATION: 95 % | BODY MASS INDEX: 25.52 KG/M2 | DIASTOLIC BLOOD PRESSURE: 72 MMHG | HEART RATE: 80 BPM | WEIGHT: 130 LBS | HEIGHT: 60 IN | SYSTOLIC BLOOD PRESSURE: 118 MMHG

## 2019-01-22 DIAGNOSIS — I35.0 NON-RHEUMATIC AORTIC STENOSIS: ICD-10-CM

## 2019-01-22 DIAGNOSIS — I35.1 NONRHEUMATIC AORTIC VALVE INSUFFICIENCY: ICD-10-CM

## 2019-01-22 DIAGNOSIS — E78.2 MIXED HYPERLIPIDEMIA: ICD-10-CM

## 2019-01-22 DIAGNOSIS — I11.9 HYPERTENSIVE HEART DISEASE WITHOUT HEART FAILURE: ICD-10-CM

## 2019-01-22 DIAGNOSIS — R60.0 BILATERAL LEG EDEMA: ICD-10-CM

## 2019-01-22 DIAGNOSIS — I36.1 NONRHEUMATIC TRICUSPID VALVE REGURGITATION: ICD-10-CM

## 2019-01-22 DIAGNOSIS — I63.512 CEREBROVASCULAR ACCIDENT (CVA) DUE TO OCCLUSION OF LEFT MIDDLE CEREBRAL ARTERY (HCC): ICD-10-CM

## 2019-01-22 DIAGNOSIS — I10 ESSENTIAL HYPERTENSION: ICD-10-CM

## 2019-01-22 DIAGNOSIS — I51.89 DIASTOLIC DYSFUNCTION: ICD-10-CM

## 2019-01-22 DIAGNOSIS — I34.0 NONRHEUMATIC MITRAL VALVE REGURGITATION: ICD-10-CM

## 2019-01-22 DIAGNOSIS — R06.02 SHORTNESS OF BREATH ON EXERTION: Primary | ICD-10-CM

## 2019-01-22 PROCEDURE — 99214 OFFICE O/P EST MOD 30 MIN: CPT | Performed by: INTERNAL MEDICINE

## 2019-01-22 NOTE — PROGRESS NOTES
[facility]  [facility] [address]  [facility] [address]  Cardiology Consultation     Zoie De Santiago  [id number]  2/8/1921      1  Shortness of breath on exertion     2  Bilateral leg edema     3  Essential hypertension     4  Mixed hyperlipidemia     5  Hypertensive heart disease without heart failure     6  Diastolic dysfunction     7  Nonrheumatic aortic valve insufficiency     8  Nonrheumatic mitral valve regurgitation     9  Non-rheumatic aortic stenosis     10  Nonrheumatic tricuspid valve regurgitation     11  Cerebrovascular accident (CVA) due to occlusion of left middle cerebral artery [facility])         Chief Complaint   Patient presents with    Follow-up     5 month HHD/DD       HPI:  Patient with history of recent CVA, HTN, hypertensive heart disease, HLD presents for f/u  States that she has COPD and SOB from it  She has lower extremity edema which is stable  Denies chest pain, palpitations, lightheadedness, syncope, orthopnea, PND    HTN, HHD, DD -  Has been hypertensive for many years  Taking medications regularly  Denies headache, medication side effects        HLP -  Has had hyperlipidemia for many years  Taking statin regularly along with diet control  Denies myalgia    PCP closely monitoring the blood work      AS, AR, MR, TR - stable, mild but together might be contributing to her SOB      Patient Active Problem List   Diagnosis    Shortness of breath on exertion    Bilateral leg edema    Essential hypertension    Hypertensive heart disease without heart failure    Diastolic dysfunction    Mixed hyperlipidemia    Nonrheumatic aortic valve insufficiency    Nonrheumatic mitral valve regurgitation    Non-rheumatic aortic stenosis    Nonrheumatic tricuspid valve regurgitation    Cerebrovascular accident (CVA) due to occlusion of left middle cerebral artery ([address] )    Hiatal hernia    Hypercholesterolemia     Past Medical History:   Diagnosis Date    Aortic stenosis     Arthritis     Asthma     Chest pain     Chronic lower back pain     COPD (chronic obstructive pulmonary disease) (HCC)     Esophageal reflux     Hyperlipidemia     Hypertension     Varicose veins of leg with edema, unspecified laterality      Social History     Social History    Marital status:      Spouse name: N/A    Number of children: N/A    Years of education: N/A     Occupational History    Not on file  Social History Main Topics    Smoking status: Never Smoker    Smokeless tobacco: Never Used    Alcohol use No    Drug use: No    Sexual activity: No     Other Topics Concern    Not on file     Social History Narrative    No narrative on file      Family History   Problem Relation Age of Onset    Tuberculosis Mother     Heart attack Father     Hypertension Son     Hyperlipidemia Son     Hypertension Brother     Colon cancer Brother      Past Surgical History:   Procedure Laterality Date    APPENDECTOMY      CATARACT EXTRACTION      CHOLECYSTECTOMY         Current Outpatient Prescriptions:     furosemide (LASIX) 20 mg tablet, Take 2 tablets (40 mg total) by mouth daily, Disp: 90 tablet, Rfl: 0    meclizine (ANTIVERT) 12 5 MG tablet, Take 1 tablet (12 5 mg total) by mouth every 8 (eight) hours, Disp: 90 tablet, Rfl: 3    Multiple Vitamins-Minerals (CHOICEFUL MULTIVITAMIN) CAPS, Take 1 capsule by mouth daily, Disp: , Rfl:     Potassium Chloride ER 20 MEQ TBCR, Take 1 tablet (20 mEq total) by mouth daily, Disp: 30 tablet, Rfl: 0  Allergies   Allergen Reactions    Codeine      Reaction Date: [id number];      Vitals:    01/22/19 1330   BP: 118/72   Pulse: 80   SpO2: 95%   Weight: 59 kg (130 lb)   Height: 5' (1 524 m)       Labs:  No visits with results within 2 Month(s) from this visit     Latest known visit with results is:   Admission on 08/19/2018, Discharged on 08/22/2018   Component Date Value    WBC 08/19/2018 7 40     RBC 08/19/2018 4 39     Hemoglobin 08/19/2018 13 1     Hematocrit 08/19/2018 39 9     MCV 08/19/2018 91     MCH 08/19/2018 29 8     MCHC 08/19/2018 32 8     RDW 08/19/2018 14 3     MPV 08/19/2018 12 2     Platelets 33/89/4724 174     nRBC 08/19/2018 0     Neutrophils Relative 08/19/2018 52     Immat GRANS % 08/19/2018 0     Lymphocytes Relative 08/19/2018 37     Monocytes Relative 08/19/2018 10     Eosinophils Relative 08/19/2018 1     Basophils Relative 08/19/2018 0     Neutrophils Absolute 08/19/2018 3 77     Immature Grans Absolute 08/19/2018 0 02     Lymphocytes Absolute 08/19/2018 2 74     Monocytes Absolute 08/19/2018 0 74     Eosinophils Absolute 08/19/2018 0 10     Basophils Absolute 08/19/2018 0 03     Sodium 08/19/2018 139     Potassium 08/19/2018 3 3*    Chloride 08/19/2018 99*    CO2 08/19/2018 31     ANION GAP 08/19/2018 9     BUN 08/19/2018 15     Creatinine 08/19/2018 0 86     Glucose 08/19/2018 121     Calcium 08/19/2018 9 6     eGFR 08/19/2018 57     Troponin I 08/19/2018 <0 02     Total Bilirubin 08/19/2018 0 40     Bilirubin, Direct 08/19/2018 0 11     Alkaline Phosphatase 08/19/2018 103     AST 08/19/2018 22     ALT 08/19/2018 22     Total Protein 08/19/2018 7 4     Albumin 08/19/2018 3 3*    NT-proBNP 08/19/2018 721*    Troponin I 08/19/2018 0 02     Ventricular Rate 08/19/2018 65     Atrial Rate 08/19/2018 65     VT Interval 08/19/2018 164     QRSD Interval 08/19/2018 98     QT Interval 08/19/2018 346     QTC Interval 08/19/2018 359     P Axis 08/19/2018 20     QRS Axis 08/19/2018 42     T Wave Axis 08/19/2018 33     Ventricular Rate 08/19/2018 63     Atrial Rate 08/19/2018 63     VT Interval 08/19/2018 148     QRSD Interval 08/19/2018 126     QT Interval 08/19/2018 438     QTC Interval 08/19/2018 448     P Axis 08/19/2018 27     QRS Axis 08/19/2018 46     T Wave Axis 08/19/2018 21     Ventricular Rate 08/19/2018 69     Atrial Rate 08/19/2018 69     VT Interval 08/19/2018 156  QRSD Interval 08/19/2018 102     QT Interval 08/19/2018 412     QTC Interval 08/19/2018 441     P Axis 08/19/2018 24     QRS Axis 08/19/2018 36     T Wave Axis 08/19/2018 -18     Cholesterol 08/20/2018 275*    Triglycerides 08/20/2018 98     HDL, Direct 08/20/2018 59     LDL Calculated 08/20/2018 196*    Hemoglobin A1C 08/20/2018 6 0     EAG 08/20/2018 126     Sodium 08/20/2018 140     Potassium 08/20/2018 3 5     Chloride 08/20/2018 103     CO2 08/20/2018 31     ANION GAP 08/20/2018 6     BUN 08/20/2018 13     Creatinine 08/20/2018 0 84     Glucose 08/20/2018 102     Calcium 08/20/2018 9 3     eGFR 08/20/2018 58     WBC 08/20/2018 7 56     RBC 08/20/2018 4 26     Hemoglobin 08/20/2018 12 8     Hematocrit 08/20/2018 38 8     MCV 08/20/2018 91     MCH 08/20/2018 30 0     MCHC 08/20/2018 33 0     RDW 08/20/2018 14 3     Platelets 60/66/3061 154     MPV 08/20/2018 12 1     Color, UA 08/20/2018 Yellow     Clarity, UA 08/20/2018 Clear     Specific Gravity, UA 08/20/2018 1 010     pH, UA 08/20/2018 7 0     Leukocytes, UA 08/20/2018 Trace*    Nitrite, UA 08/20/2018 Negative     Protein, UA 08/20/2018 Negative     Glucose, UA 08/20/2018 Negative     Ketones, UA 08/20/2018 Negative     Urobilinogen, UA 08/20/2018 0 2     Bilirubin, UA 08/20/2018 Negative     Blood, UA 08/20/2018 Negative     RBC, UA 08/20/2018 None Seen     WBC, UA 08/20/2018 0-1*    Epithelial Cells 08/20/2018 None Seen     Bacteria, UA 08/20/2018 None Seen     Ventricular Rate 08/21/2018 64     Atrial Rate 08/21/2018 64     FL Interval 08/21/2018 150     QRSD Interval 08/21/2018 100     QT Interval 08/21/2018 386     QTC Interval 08/21/2018 398     P Axis 08/21/2018 57     QRS [place] 08/21/2018 -17     T Wave Axis 08/21/2018 116     Ventricular Rate 08/19/2018 65     Atrial Rate 08/19/2018 61     QRSD Interval 08/19/2018 102     QT Interval 08/19/2018 412     QTC Interval 08/19/2018 428     QRS Axis 08/19/2018 51     T Wave Axis 08/19/2018 -6      Imaging: No results found  Review of Systems:  Review of Systems   Constitutional: Negative for diaphoresis, fatigue and fever  HENT: Negative for congestion, ear discharge, hearing loss, sinus pain and sore throat  Eyes: Negative for pain, redness and visual disturbance  Respiratory: Positive for shortness of breath  Negative for cough, chest tightness and wheezing  Cardiovascular: Positive for leg swelling  Negative for chest pain and palpitations  Gastrointestinal: Negative for abdominal distention, abdominal pain, constipation, diarrhea, nausea and vomiting  Endocrine: Negative for cold intolerance and heat intolerance  Genitourinary: Negative for difficulty urinating and hematuria  Musculoskeletal: Negative for arthralgias, back pain, gait problem, joint swelling, myalgias, neck pain and neck stiffness  Skin: Negative for color change, pallor, rash and wound  Neurological: Negative for syncope, weakness, numbness and headaches  Hematological: Does not bruise/bleed easily  Psychiatric/Behavioral: Negative for agitation, behavioral problems and confusion  The patient is not nervous/anxious  /72   Pulse 80   Ht 5' (1 524 m)   Wt 59 kg (130 lb)   SpO2 95%   BMI 25 39 kg/m²     Physical Exam:  Physical Exam   Constitutional: She is oriented to person, place, and time  She appears well-developed and well-nourished  HENT:   Head: Normocephalic and atraumatic  Eyes: Pupils are equal, round, and reactive to light  Conjunctivae and EOM are normal    Neck: Normal range of motion  Neck supple  Cardiovascular: Normal rate, regular rhythm and intact distal pulses  Exam reveals no gallop and no friction rub  Murmur heard  Pulmonary/Chest: Effort normal and breath sounds normal  No respiratory distress  She has no wheezes  She has no rales  She exhibits no tenderness  Abdominal: Soft   Bowel sounds are normal  She exhibits no distension  There is no rebound  Musculoskeletal: Normal range of motion  She exhibits edema  +2 edema   Neurological: She is alert and oriented to person, place, and time  She has normal reflexes  Skin: Skin is warm and dry  Psychiatric: She has a normal mood and affect  Her behavior is normal  Judgment and thought content normal        Discussion/Summary:  1  SOB on exertion:  -Likely multifactorial due to asthma, COPD, 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, multivalvular disease  -ECHO Aug 2018 shows EF 60%, no RWMA, estimated peak pressure at least 30 mmHg, mild MR, mild AS, mild AR, mild TR, trace IN     2  BL LE edema  Continue furosemide and potassium  Keep legs elevated at night  Low-salt diet    3  HTN, hypertensive heart disease, diastolic dysfunction:  ECHO findings as above  BP stable  Continue current antihypertensive medications    4  HLD:  Continue diet control  Statin avoided at this age    11  AR/MR/AS/TR:   ECHO Aug 2018 shows EF 60%, no RWMA, estimated peak pressure at least 30 mmHg, ild MR< mild AS< mild AR, mild TR, trace IN  Recommend aggressive risk factor modification and therapeutic lifestyle changes  Low salt, low calorie, low fat, low cholesterol diet  Discussed concepts of atherosclerosis, including signs and symptoms of cardiac disease  Previous studies were reviewed  Safety measures were reviewed  Questions were entertained and answered  Patient was advised to report any problem requiring medical attention  Follow up with appropriate specialists and lab work as discussed  Return for follow up visit as scheduled or earlier, if needed  Thank you for allowing me to participate in the care and evaluation of your patient  Should you have any questions, please feel free to contact me

## 2019-01-30 ENCOUNTER — TELEPHONE (OUTPATIENT)
Dept: CARDIOLOGY CLINIC | Facility: CLINIC | Age: 84
End: 2019-01-30

## 2019-01-30 NOTE — TELEPHONE ENCOUNTER
S/w [place] and informed her on instructions  Daughter stated that pt does not see PCP and PCP does not really know pt  Daughter also stated that during last visit she had concerns about ulcers and thinks bruises are due to water retention   Daughter stated that she would like to use hydrocortisone cream

## 2019-01-30 NOTE — TELEPHONE ENCOUNTER
Pt's daughter in law Beth Caceresinder called and stated that she notice on pt's legs some bruising  Beth Champagne would like a call back to discuss if its ok to put anything on pt's legs  Please call [phone] cell or try house # that's on file

## 2019-06-27 ENCOUNTER — OFFICE VISIT (OUTPATIENT)
Dept: CARDIOLOGY CLINIC | Facility: CLINIC | Age: 84
End: 2019-06-27
Payer: MEDICARE

## 2019-06-27 VITALS
HEART RATE: 70 BPM | OXYGEN SATURATION: 92 % | WEIGHT: 127 LBS | BODY MASS INDEX: 24.94 KG/M2 | DIASTOLIC BLOOD PRESSURE: 71 MMHG | HEIGHT: 60 IN | SYSTOLIC BLOOD PRESSURE: 127 MMHG

## 2019-06-27 DIAGNOSIS — I34.0 NONRHEUMATIC MITRAL VALVE REGURGITATION: ICD-10-CM

## 2019-06-27 DIAGNOSIS — I10 ESSENTIAL HYPERTENSION: ICD-10-CM

## 2019-06-27 DIAGNOSIS — I35.0 NON-RHEUMATIC AORTIC STENOSIS: ICD-10-CM

## 2019-06-27 DIAGNOSIS — R60.0 BILATERAL LEG EDEMA: Primary | ICD-10-CM

## 2019-06-27 DIAGNOSIS — I36.1 NONRHEUMATIC TRICUSPID VALVE REGURGITATION: ICD-10-CM

## 2019-06-27 DIAGNOSIS — I11.9 HYPERTENSIVE HEART DISEASE WITHOUT HEART FAILURE: ICD-10-CM

## 2019-06-27 DIAGNOSIS — E78.2 MIXED HYPERLIPIDEMIA: ICD-10-CM

## 2019-06-27 DIAGNOSIS — I35.1 NONRHEUMATIC AORTIC VALVE INSUFFICIENCY: ICD-10-CM

## 2019-06-27 PROCEDURE — 99214 OFFICE O/P EST MOD 30 MIN: CPT | Performed by: INTERNAL MEDICINE

## 2019-07-31 ENCOUNTER — TELEPHONE (OUTPATIENT)
Dept: CARDIOLOGY CLINIC | Facility: CLINIC | Age: 84
End: 2019-07-31

## 2019-07-31 NOTE — TELEPHONE ENCOUNTER
Spoke with [employer]       Please call [employer] to make appt for Tanya Diaz at    pt wants to go to [facility]note 3[facility] dr Nando lombardi

## 2019-07-31 NOTE — TELEPHONE ENCOUNTER
OK to increase lasix and potassium both  Legs need to be evaluated by PCP or us to rule out cellulitis

## 2019-07-31 NOTE — TELEPHONE ENCOUNTER
S/w Cristóbal Heaton, states that she has noticed that pt's legs have been weeping  Pt is on Lasix 40mg daily, pt is always SOB and has chronic leg edema  States that she always keeps pt's legs elevated but it is not helping  Cristóbal Heaton states that it has been a struggle with increasing diuretics because the pt's BP always goes down, she did not have any current BP readings or weights   Please advise  -[phone]

## 2019-07-31 NOTE — TELEPHONE ENCOUNTER
ASIF PT DAUGHTER IN LAW CALLED AND SAID PT HAS HAD SPOTS OF WATER ON HER LOWER RIGHT LEG FOR THE PAST COUPLE OF DAYS  PT LEGS ARE VERY SWOLLEN   TRANSFERRED CALL TO Kiel Mccormick

## 2019-07-31 NOTE — TELEPHONE ENCOUNTER
Spoke with the pt daughter pt BP is as follows  134/78, 128/98, 126/74, 127/81 taken today before meds increase  She would also like to know Should patients potassium be increased as well? Pt has  About 5 red sores on one leg, they would like to know if they can put neosporin on the wounds? They stated it looks like ulcers from the swelling  Pt leggings are also becoming saturated by the weeping water from her legs

## 2019-08-01 ENCOUNTER — OFFICE VISIT (OUTPATIENT)
Dept: CARDIOLOGY CLINIC | Facility: CLINIC | Age: 84
End: 2019-08-01
Payer: MEDICARE

## 2019-08-01 VITALS
HEART RATE: 83 BPM | OXYGEN SATURATION: 94 % | WEIGHT: 127 LBS | DIASTOLIC BLOOD PRESSURE: 64 MMHG | HEIGHT: 60 IN | BODY MASS INDEX: 24.94 KG/M2 | SYSTOLIC BLOOD PRESSURE: 118 MMHG

## 2019-08-01 DIAGNOSIS — E78.2 MIXED HYPERLIPIDEMIA: ICD-10-CM

## 2019-08-01 DIAGNOSIS — I35.1 NONRHEUMATIC AORTIC VALVE INSUFFICIENCY: ICD-10-CM

## 2019-08-01 DIAGNOSIS — I11.9 HYPERTENSIVE HEART DISEASE WITHOUT HEART FAILURE: ICD-10-CM

## 2019-08-01 DIAGNOSIS — I10 ESSENTIAL HYPERTENSION: ICD-10-CM

## 2019-08-01 DIAGNOSIS — R60.0 BILATERAL LEG EDEMA: Primary | ICD-10-CM

## 2019-08-01 PROCEDURE — 99214 OFFICE O/P EST MOD 30 MIN: CPT | Performed by: INTERNAL MEDICINE

## 2019-08-01 RX ORDER — FUROSEMIDE 20 MG/1
TABLET ORAL
Qty: 120 TABLET | Refills: 3 | Status: SHIPPED | OUTPATIENT
Start: 2019-08-01 | End: 2020-01-20

## 2019-08-01 RX ORDER — POTASSIUM CHLORIDE 1500 MG/1
TABLET, FILM COATED, EXTENDED RELEASE ORAL
Qty: 60 TABLET | Refills: 4 | Status: SHIPPED | OUTPATIENT
Start: 2019-08-01 | End: 2020-01-20

## 2019-08-01 NOTE — PROGRESS NOTES
[facility] [place] [id number] [facility] [place] [address]  Cardiology Follow Up    Bárbara Norris  2/8/1921  [id number]      1  Bilateral leg edema     2  Essential hypertension     3  Nonrheumatic aortic valve insufficiency         Chief Complaint   Patient presents with    Leg Swelling     right side w/drainage       Interval History: This is a patient of Dr Eliazar Conway who is brought by family with symptoms of lower extremity edema and blisters with weeping wound  Patient does have history of lower extremity edema and has been on diuretics  Patient denies any history of fever chills and rigors  Patient is a difficult historian secondary to advanced age and hearing difficulty  No history of chest pain  Patient does have some shortness of breath  Family states that patient has been following salt restriction as well as compliance with medications  There is no redness in the blisters noted in the right lower extremity  Patient Active Problem List   Diagnosis    Shortness of breath on exertion    Bilateral leg edema    Essential hypertension    Hypertensive heart disease without heart failure    Diastolic dysfunction    Mixed hyperlipidemia    Nonrheumatic aortic valve insufficiency    Nonrheumatic mitral valve regurgitation    Non-rheumatic aortic stenosis    Nonrheumatic tricuspid valve regurgitation    Cerebrovascular accident (CVA) due to occlusion of left middle cerebral artery ([address] )    Hiatal hernia    Hypercholesterolemia     Past Medical History:   Diagnosis Date    Aortic stenosis     Arthritis     Asthma     Chest pain     Chronic lower back pain     COPD (chronic obstructive pulmonary disease) (HCC)     Esophageal reflux     Hyperlipidemia     Hypertension     Varicose veins of leg with edema, unspecified laterality      Social History     Socioeconomic History    Marital status:       Spouse name: Not on file    Number of children: Not on file    Years of education: Not on file    Highest education level: Not on file   Occupational History    Not on file   Social Needs    Financial resource strain: Not on file    Food insecurity:     Worry: Not on file     Inability: Not on file    Transportation needs:     Medical: Not on file     Non-medical: Not on file   Tobacco Use    Smoking status: Never Smoker    Smokeless tobacco: Never Used   Substance and Sexual Activity    Alcohol use: No    Drug use: No    Sexual activity: Never   Lifestyle    Physical activity:     Days per week: Not on file     Minutes per session: Not on file    Stress: Not on file   Relationships    Social connections:     Talks on phone: Not on file     Gets together: Not on file     Attends Restoration service: Not on file     Active member of club or organization: Not on file     Attends meetings of clubs or organizations: Not on file     Relationship status: Not on file    Intimate partner violence:     Fear of current or ex partner: Not on file     Emotionally abused: Not on file     Physically abused: Not on file     Forced sexual activity: Not on file   Other Topics Concern    Not on file   Social History Narrative    Not on file      Family History   Problem Relation Age of Onset    Tuberculosis Mother     Heart attack Father     Hypertension Son     Hyperlipidemia Son     Hypertension Brother     Colon cancer Brother      Past Surgical History:   Procedure Laterality Date    APPENDECTOMY      CATARACT EXTRACTION      CHOLECYSTECTOMY         Current Outpatient Medications:     furosemide (LASIX) 20 mg tablet, Take 2 tablets (40 mg total) by mouth daily, Disp: 90 tablet, Rfl: 0    meclizine (ANTIVERT) 12 5 MG tablet, Take 1 tablet (12 5 mg total) by mouth every 8 (eight) hours, Disp: 90 tablet, Rfl: 3    Multiple Vitamins-Minerals (CHOICEFUL MULTIVITAMIN) CAPS, Take 1 capsule by mouth daily, Disp: , Rfl:     Potassium Chloride ER 20 MEQ TBCR, Take 1 tablet (20 mEq total) by mouth daily, Disp: 30 tablet, Rfl: 0  Allergies   Allergen Reactions    Codeine      Reaction Date: [id number];        Labs:  No visits with results within 2 Month(s) from this visit     Latest known visit with results is:   Admission on 08/19/2018, Discharged on 08/22/2018   Component Date Value    WBC 08/19/2018 7 40     RBC 08/19/2018 4 39     Hemoglobin 08/19/2018 13 1     Hematocrit 08/19/2018 39 9     MCV 08/19/2018 91     MCH 08/19/2018 29 8     MCHC 08/19/2018 32 8     RDW 08/19/2018 14 3     MPV 08/19/2018 12 2     Platelets 50/94/6218 174     nRBC 08/19/2018 0     Neutrophils Relative 08/19/2018 52     Immat GRANS % 08/19/2018 0     Lymphocytes Relative 08/19/2018 37     Monocytes Relative 08/19/2018 10     Eosinophils Relative 08/19/2018 1     Basophils Relative 08/19/2018 0     Neutrophils Absolute 08/19/2018 3 77     Immature Grans Absolute 08/19/2018 0 02     Lymphocytes Absolute 08/19/2018 2 74     Monocytes Absolute 08/19/2018 0 74     Eosinophils Absolute 08/19/2018 0 10     Basophils Absolute 08/19/2018 0 03     Sodium 08/19/2018 139     Potassium 08/19/2018 3 3*    Chloride 08/19/2018 99*    CO2 08/19/2018 31     ANION GAP 08/19/2018 9     BUN 08/19/2018 15     Creatinine 08/19/2018 0 86     Glucose 08/19/2018 121     Calcium 08/19/2018 9 6     eGFR 08/19/2018 57     Troponin I 08/19/2018 <0 02     Total Bilirubin 08/19/2018 0 40     Bilirubin, Direct 08/19/2018 0 11     Alkaline Phosphatase 08/19/2018 103     AST 08/19/2018 22     ALT 08/19/2018 22     Total Protein 08/19/2018 7 4     Albumin 08/19/2018 3 3*    NT-proBNP 08/19/2018 721*    Troponin I 08/19/2018 0 02     Ventricular Rate 08/19/2018 65     Atrial Rate 08/19/2018 65     SD Interval 08/19/2018 164     QRSD Interval 08/19/2018 98     QT Interval 08/19/2018 346     QTC Interval 08/19/2018 359     P Axis 08/19/2018 20     QRS Axis 08/19/2018 42     T Wave Axis 08/19/2018 33  Ventricular Rate 08/19/2018 63     Atrial Rate 08/19/2018 63     OK Interval 08/19/2018 148     QRSD Interval 08/19/2018 126     QT Interval 08/19/2018 438     QTC Interval 08/19/2018 448     P Axis 08/19/2018 27     QRS Axis 08/19/2018 46     T Wave Axis 08/19/2018 21     Ventricular Rate 08/19/2018 69     Atrial Rate 08/19/2018 69     OK Interval 08/19/2018 156     QRSD Interval 08/19/2018 102     QT Interval 08/19/2018 412     QTC Interval 08/19/2018 441     P Axis 08/19/2018 24     QRS Axis 08/19/2018 36     T Wave Axis 08/19/2018 -18     Cholesterol 08/20/2018 275*    Triglycerides 08/20/2018 98     HDL, Direct 08/20/2018 59     LDL Calculated 08/20/2018 196*    Hemoglobin A1C 08/20/2018 6 0     EAG 08/20/2018 126     Sodium 08/20/2018 140     Potassium 08/20/2018 3 5     Chloride 08/20/2018 103     CO2 08/20/2018 31     ANION GAP 08/20/2018 6     BUN 08/20/2018 13     Creatinine 08/20/2018 0 84     Glucose 08/20/2018 102     Calcium 08/20/2018 9 3     eGFR 08/20/2018 58     WBC 08/20/2018 7 56     RBC 08/20/2018 4 26     Hemoglobin 08/20/2018 12 8     Hematocrit 08/20/2018 38 8     MCV 08/20/2018 91     MCH 08/20/2018 30 0     MCHC 08/20/2018 33 0     RDW 08/20/2018 14 3     Platelets 54/76/0832 154     MPV 08/20/2018 12 1     Color, UA 08/20/2018 Yellow     Clarity, UA 08/20/2018 Clear     Specific Gravity, UA 08/20/2018 1 010     pH, UA 08/20/2018 7 0     Leukocytes, UA 08/20/2018 Trace*    Nitrite, UA 08/20/2018 Negative     Protein, UA 08/20/2018 Negative     Glucose, UA 08/20/2018 Negative     Ketones, UA 08/20/2018 Negative     Urobilinogen, UA 08/20/2018 0 2     Bilirubin, UA 08/20/2018 Negative     Blood, UA 08/20/2018 Negative     RBC, UA 08/20/2018 None Seen     WBC, UA 08/20/2018 0-1*    Epithelial Cells 08/20/2018 None Seen     Bacteria, UA 08/20/2018 None Seen     Ventricular Rate 08/21/2018 64     Atrial Rate 08/21/2018 64     OK Interval 08/21/2018 150     QRSD Interval 08/21/2018 100     QT Interval 08/21/2018 386     QTC Interval 08/21/2018 398     P Axis 08/21/2018 57     QRS [place] 08/21/2018 -17     T Wave Axis 08/21/2018 116     Ventricular Rate 08/19/2018 65     Atrial Rate 08/19/2018 61     QRSD Interval 08/19/2018 102     QT Interval 08/19/2018 412     QTC Interval 08/19/2018 428     QRS Axis 08/19/2018 51     T Wave Axis 08/19/2018 -6      Imaging: No results found  Review of Systems:  Review of Systems     REVIEW OF SYSTEMS:  Constitutional:  Denies fever or chills   Eyes:  Denies change in visual acuity   HENT:    Hearing difficulty  Respiratory:   shortness of breath   Cardiovascular:  edema  GI:  Denies abdominal pain, nausea, vomiting, bloody stools or diarrhea   :  Denies dysuria, frequency, difficulty in micturition and nocturia  Musculoskeletal:  Denies back pain or joint pain   Neurologic:  Denies headache, focal weakness or sensory changes   Endocrine:  Denies polyuria or polydipsia   Lymphatic:  Denies swollen glands   Psychiatric:  Denies depression or anxiety     Physical Exam:    /64   Pulse 83   Ht 5' (1 524 m)   Wt 57 6 kg (127 lb)   SpO2 94%   BMI 24 80 kg/m²     Physical Exam    PHYSICAL EXAM:  General:  Patient is not in acute distress   Head: Normocephalic, Atraumatic  HEENT:  Both pupils normal-size atraumatic, normocephalic, nonicteric  Neck:  JVP not raised  Trachea central  No carotid bruit  Respiratory:   Decreased breath sounds  Cardiovascular:  Regular rate and rhythm no S3   2/6 systolic ejection murmur in the right sternal border  GI:  Abdomen soft nontender  No organomegaly  Lymphatic:  No cervical or inguinal lymphadenopathy  Neurologic:  Patient is awake alert, oriented   Grossly nonfocal   extremities reveal 2+ edema  There are blisters in the right lower extremity with clear watery discharge  There is no evidence of redness or tenderness suggestive of cellulitis  There is also no evidence of yellowish or greenish discharge  Discussion/Summary:   Patient with shortness of breath and lower extremity edema  Patient's family has been instructed to increase diuretic dosage to 20 mg 2 tablets twice a day  However family is hesitant to do that  In view of this, patient will increase Lasix to 2 tablets of 20 mg a m  And 1 tablet in p m  For the next few days and if he is able to tolerate increase it further  Follow-up blood work for renal parameters and potassium  Also increase potassium supplementation  Patient also has history of valvular heart disease which has been managed conservatively given her advanced age  Continue salt restriction  Follow-up with Dr Haresh Lopez  Patient's family had a lot of questions which were answered  Time 30 min  50% of the time was spent in counseling and coordination of care

## 2019-09-12 ENCOUNTER — TELEPHONE (OUTPATIENT)
Dept: CARDIOLOGY CLINIC | Facility: CLINIC | Age: 84
End: 2019-09-12

## 2019-09-12 NOTE — TELEPHONE ENCOUNTER
Pt daughter in law would like a home health aide to help with pt basic needs  John Peters wants to know if Dr Shan Bryan will write a script for this for Medicare  Pt doesn't really see a PCP  John Peters is aware that Dr Shan Bryan is out of the office until 09/23/19   Thank you

## 2019-09-17 NOTE — TELEPHONE ENCOUNTER
Spoke with patient's daughter at length regarding the need to be seen by PCP to evaluate for home health care/ or nursing  The patient's daughter expressed her mother is having difficulty walking and doing her daily activities  Would Dr Ramona Camilo be able to see patient it looks like she hasn't been seen be him since 2017

## 2019-09-18 NOTE — PROGRESS NOTES
Assessment/Plan:       Diagnoses and all orders for this visit:    Encounter to establish care    Bilateral leg edema  -     Pneumatic compression pumps    Mixed hyperlipidemia    History of TIA (transient ischemic attack)  -     Ambulatory Referral to Home Health; Future    Numbness of left hand  -     Ambulatory Referral to Home Health; Future    Frequent falls  -     Ambulatory Referral to Home Health; Future    Shortness of breath on exertion    Hiatal hernia    Long term current use of diuretic    Diuretic-induced hypokalemia    Gait instability  -     Ambulatory Referral to Home Health; Future    Arthritis  -     Ambulatory Referral to Home Health; Future    Weakness of left side of body  -     Ambulatory Referral to Home Health; Future    Other orders  -     ciprofloxacin (CIPRO) 250 mg tablet        No problem-specific Assessment & Plan notes found for this encounter  Subjective:      Patient ID: Isabel Scott is a 80 y o  female  Patient is here to re-establish care  Daughter in law states she hasn't been here in two years  Daughter in law states she wants to get home health for in the home  Patient also follows with Dr Angelika Bernard (Cardiology)  They state that Dr Angelika Bernard has been managing her care lately  She has chronic bilateral lower extremity edema  Patient takes Lasix BID  Bilateral lower extremities weap and has multiple scattered scabs on bilateral legs  Fluid appears clear to light yellow in color  This has been ongoing for several years  Has tried Thom stockings before  Last Echo 8/2018 -    LEFT VENTRICLE:  Ejection fraction was estimated to be 60 %  There were no regional wall motion abnormalities  Concentric hypertrophy was present  Had a TIA 8/2018 - She does have numbness in bilateral hands  Uses a cane or a walker at home  She has 14 stairs in the home  She had become incoherent at the time of the TIA and passed out    Daughter in law states she did have another stroke several years ago and her left side was affected at that time  Hyperlipidemia - Status unknown  No recent labs  Numbness - Left hand intermittently  She has trouble eating with holding the silverware  She does have numbness on right hand first two fingers and left hand thumb and first finger  Shortness of breath - During rest and activity  Hiatal Hernia - Patient has frequent coughing everyday throughout the day  Does not bring up any sputum  Patient states that she does get dizzy  Patient had a fall at home today  She states this does not happen a lot  Daughter in law states that it happens maybe once a month  Finishing treatment for left ear infection;  Daughter in law states that it is still draining but no odor present anymore  The following portions of the patient's history were reviewed and updated as appropriate:   She has a past medical history of Aortic stenosis, Arthritis, Asthma, Chest pain, Chronic lower back pain, COPD (chronic obstructive pulmonary disease) ([address] ), Esophageal reflux, Hyperlipidemia, Hypertension, and Varicose veins of leg with edema, unspecified laterality  ,  does not have any pertinent problems on file  ,   has a past surgical history that includes Appendectomy; Cholecystectomy; and Cataract extraction  ,  family history includes Colon cancer in her brother; Heart attack in her father; Hyperlipidemia in her son; Hypertension in her brother and son; Tuberculosis in her mother  ,   reports that she has never smoked  She has never used smokeless tobacco  She reports that she does not drink alcohol or use drugs  ,  is allergic to codeine and pollen extract     Current Outpatient Medications   Medication Sig Dispense Refill    ciprofloxacin (CIPRO) 250 mg tablet       furosemide (LASIX) 20 mg tablet 2 tab twice a day 120 tablet 3    Multiple Vitamins-Minerals (CHOICEFUL MULTIVITAMIN) CAPS Take 1 capsule by mouth daily      Potassium Chloride ER 20 MEQ TBCR 1 tab twice a day 60 tablet 4     No current facility-administered medications for this visit  Review of Systems   Constitutional: Negative  Negative for fatigue and fever  HENT: Negative  Negative for congestion  Eyes: Negative  Negative for visual disturbance  Respiratory: Positive for shortness of breath (at rest and with activity)  Negative for cough, chest tightness and wheezing  Cardiovascular: Positive for leg swelling  Chronic pitting edema to bilateral lower extremities     Gastrointestinal: Negative  Negative for abdominal pain, blood in stool, diarrhea and nausea  Endocrine: Negative for polydipsia, polyphagia and polyuria  Genitourinary: Negative for difficulty urinating and flank pain  Musculoskeletal: Positive for gait problem (uses cane/walker)  Negative for arthralgias, back pain and myalgias  Bilateral leg pain, R>L  Skin: Positive for wound (multiple scabs to bilateral lower extremities)  Negative for color change, pallor and rash  Bilateral lower extremity skin shiny  Legs weap at times, color of fluid is clear to light yellow  Allergic/Immunologic: Negative for immunocompromised state  Neurological: Positive for dizziness and numbness (Numbness first two fingers R hand; Numbess Left hand thumb and first finger)  Negative for weakness, light-headedness and headaches  Hematological: Negative for adenopathy  Psychiatric/Behavioral: Negative  Negative for confusion, decreased concentration and sleep disturbance  All other systems reviewed and are negative  Objective:  Vitals:    09/19/19 1413   BP: 126/82   BP Location: Left arm   Patient Position: Sitting   Pulse: 62   Resp: 18   SpO2: 95%   Weight: 57 6 kg (127 lb)   Height: 5' (1 524 m)     Body mass index is 24 8 kg/m²  Physical Exam   Constitutional: Vital signs are normal  She appears well-developed and well-nourished  No distress  HENT:   Head: Normocephalic and atraumatic     Right Ear: Tympanic membrane, external ear and ear canal normal    Left Ear: External ear normal  There is drainage  Tympanic membrane is erythematous  Nose: Nose normal    Mouth/Throat: No oropharyngeal exudate  Bilateral hearing aides     Eyes: Pupils are equal, round, and reactive to light  Conjunctivae are normal  No scleral icterus  Neck: Normal range of motion  Neck supple  No JVD present  Cardiovascular: Normal rate, regular rhythm and intact distal pulses  Exam reveals no gallop and no friction rub  Murmur heard  Pulmonary/Chest: Effort normal and breath sounds normal  No respiratory distress  Abdominal: Soft  Bowel sounds are normal  She exhibits no distension  There is no tenderness  Musculoskeletal: Normal range of motion  She exhibits edema (Bilateral lower extremity pitting edema)  Lymphadenopathy:     She has no cervical adenopathy  Neurological: She is alert  She is disoriented (Patient unable to tell us date - year, day of week)  Coordination normal    Skin: Skin is warm and dry  Capillary refill takes less than 2 seconds  No rash noted  She is not diaphoretic  Multiple scabs to bilateral lower extremities  Scant light yellow fluid noted to bilateral lower extremities  Psychiatric: She has a normal mood and affect  Her behavior is normal  Judgment and thought content normal    Nursing note and vitals reviewed  BMI Counseling: Body mass index is 24 8 kg/m²  The BMI is within normal limits  Continue eating a healthy diet with 4-5 servings of fruits and veggies a day

## 2019-09-19 ENCOUNTER — OFFICE VISIT (OUTPATIENT)
Dept: FAMILY MEDICINE CLINIC | Facility: CLINIC | Age: 84
End: 2019-09-19
Payer: MEDICARE

## 2019-09-19 VITALS
RESPIRATION RATE: 18 BRPM | DIASTOLIC BLOOD PRESSURE: 82 MMHG | WEIGHT: 127 LBS | HEART RATE: 62 BPM | OXYGEN SATURATION: 95 % | BODY MASS INDEX: 24.94 KG/M2 | HEIGHT: 60 IN | SYSTOLIC BLOOD PRESSURE: 126 MMHG

## 2019-09-19 DIAGNOSIS — Z86.73 HISTORY OF TIA (TRANSIENT ISCHEMIC ATTACK): ICD-10-CM

## 2019-09-19 DIAGNOSIS — M19.90 ARTHRITIS: ICD-10-CM

## 2019-09-19 DIAGNOSIS — R20.0 NUMBNESS OF LEFT HAND: ICD-10-CM

## 2019-09-19 DIAGNOSIS — R06.02 SHORTNESS OF BREATH ON EXERTION: ICD-10-CM

## 2019-09-19 DIAGNOSIS — T50.2X5A DIURETIC-INDUCED HYPOKALEMIA: ICD-10-CM

## 2019-09-19 DIAGNOSIS — E87.6 DIURETIC-INDUCED HYPOKALEMIA: ICD-10-CM

## 2019-09-19 DIAGNOSIS — R53.1 WEAKNESS OF LEFT SIDE OF BODY: ICD-10-CM

## 2019-09-19 DIAGNOSIS — Z79.899 LONG TERM CURRENT USE OF DIURETIC: ICD-10-CM

## 2019-09-19 DIAGNOSIS — E78.2 MIXED HYPERLIPIDEMIA: ICD-10-CM

## 2019-09-19 DIAGNOSIS — K44.9 HIATAL HERNIA: ICD-10-CM

## 2019-09-19 DIAGNOSIS — R26.81 GAIT INSTABILITY: ICD-10-CM

## 2019-09-19 DIAGNOSIS — R29.6 FREQUENT FALLS: ICD-10-CM

## 2019-09-19 DIAGNOSIS — R60.0 BILATERAL LEG EDEMA: ICD-10-CM

## 2019-09-19 DIAGNOSIS — Z76.89 ENCOUNTER TO ESTABLISH CARE: Primary | ICD-10-CM

## 2019-09-19 PROBLEM — I10 ESSENTIAL HYPERTENSION: Status: RESOLVED | Noted: 2018-06-05 | Resolved: 2019-09-19

## 2019-09-19 PROCEDURE — G0439 PPPS, SUBSEQ VISIT: HCPCS | Performed by: NURSE PRACTITIONER

## 2019-09-19 PROCEDURE — 99214 OFFICE O/P EST MOD 30 MIN: CPT | Performed by: NURSE PRACTITIONER

## 2019-09-19 RX ORDER — CIPROFLOXACIN 250 MG/1
TABLET, FILM COATED ORAL
COMMUNITY
Start: 2019-09-04 | End: 2020-01-20

## 2019-09-19 NOTE — PATIENT INSTRUCTIONS
Bilateral Leg Swelling - Elevate legs when possible  Continue taking the Lasix  Obtain labs to evaluate kidney function  Consider increasing diuretic  Obtain Pneumatic Compression Pumps and try [employer]  History of TIA - Stable;  Referral to Home Health/PT/OT  Numbness of Left Hand and Right Hand first two fingers; Difficulty grasping objects - Referral to Home Health/PT/OT  Hyperlipidemia - Status unknown  No recent labs  Shortness of breath -   Hiatal Hernia - Stable symptoms  Frequent Falls/Gait Instability/Arthritis/Left Side Weakness - Referral to Home Health/PT/OT   Chronic use of diuretic/Diuretic induced hypokalemia - Obtain labs  Our office will call you with the results  Medical supply store: [facility] [address] ; [address], [place], [address]    Follow up in 4 months in the office  Medicare Preventive Visit Patient Instructions  Thank you for completing your Welcome to Medicare Visit or Medicare Annual Wellness Visit today  Your next wellness visit will be due in one year (9/19/2020)  The screening/preventive services that you may require over the next 5-10 years are detailed below  Some tests may not apply to you based off risk factors and/or age  Screening tests ordered at today's visit but not completed yet may show as past due  Also, please note that scanned in results may not display below  Preventive Screenings:  Service Recommendations Previous Testing/Comments   Colorectal Cancer Screening  * Colonoscopy    * Fecal Occult Blood Test (FOBT)/Fecal Immunochemical Test (FIT)  * Fecal DNA/Cologuard Test  * Flexible Sigmoidoscopy Age: 54-65 years old   Colonoscopy: every 10 years (may be performed more frequently if at higher risk)  OR  FOBT/FIT: every 1 year  OR  Cologuard: every 3 years  OR  Sigmoidoscopy: every 5 years  Screening may be recommended earlier than age 48 if at higher risk for colorectal cancer   Also, an individualized decision between you and your healthcare provider will decide whether screening between the ages of 74-80 would be appropriate  Colonoscopy: Not on file  FOBT/FIT: Not on file  Cologuard: Not on file  Sigmoidoscopy: Not on file    Screening Not Indicated     Breast Cancer Screening Age: 36 years old  Frequency: every 1-2 years  Not required if history of left and right mastectomy Mammogram: Not on file       Cervical Cancer Screening Between the ages of 21-29, pap smear recommended once every 3 years  Between the ages of 33-67, can perform pap smear with HPV co-testing every 5 years  Recommendations may differ for women with a history of total hysterectomy, cervical cancer, or abnormal pap smears in past  Pap Smear: Not on file    Screening Not Indicated   Hepatitis C Screening Once for adults born between 1945 and 1965  More frequently in patients at high risk for Hepatitis C Hep C Antibody: Not on file       Diabetes Screening 1-2 times per year if you're at risk for diabetes or have pre-diabetes Fasting glucose: 100 mg/dL   A1C: 6 0 %       Cholesterol Screening Once every 5 years if you don't have a lipid disorder  May order more often based on risk factors  Lipid panel: 08/20/2018    Screening Not Indicated  History Lipid Disorder     Other Preventive Screenings Covered by Medicare:  1  Abdominal Aortic Aneurysm (AAA) Screening: covered once if your at risk  You're considered to be at risk if you have a family history of AAA  2  Lung Cancer Screening: covers low dose CT scan once per year if you meet all of the following conditions: (1) Age 50-69; (2) No signs or symptoms of lung cancer; (3) Current smoker or have quit smoking within the last 15 years; (4) You have a tobacco smoking history of at least 30 pack years (packs per day multiplied by number of years you smoked); (5) You get a written order from a healthcare provider    3  Glaucoma Screening: covered annually if you're considered high risk: (1) You have diabetes OR (2) Family history of glaucoma OR (3)  aged 48 and older OR (4)  American aged 72 and older  3  Osteoporosis Screening: covered every 2 years if you meet one of the following conditions: (1) You're estrogen deficient and at risk for osteoporosis based off medical history and other findings; (2) Have a vertebral abnormality; (3) On glucocorticoid therapy for more than 3 months; (4) Have primary hyperparathyroidism; (5) On osteoporosis medications and need to assess response to drug therapy  · Last bone density test (DXA Scan): Not on file  5  HIV Screening: covered annually if you're between the age of 12-76  Also covered annually if you are younger than 13 and older than 72 with risk factors for HIV infection  For pregnant patients, it is covered up to 3 times per pregnancy  Immunizations:  Immunization Recommendations   Influenza Vaccine Annual influenza vaccination during flu season is recommended for all persons aged >= 6 months who do not have contraindications   Pneumococcal Vaccine (Prevnar and Pneumovax)  * Prevnar = PCV13  * Pneumovax = PPSV23   Adults 25-60 years old: 1-3 doses may be recommended based on certain risk factors  Adults 72 years old: Prevnar (PCV13) vaccine recommended followed by Pneumovax (PPSV23) vaccine  If already received PPSV23 since turning 65, then PCV13 recommended at least one year after PPSV23 dose  Hepatitis B Vaccine 3 dose series if at intermediate or high risk (ex: diabetes, end stage renal disease, liver disease)   Tetanus (Td) Vaccine - COST NOT COVERED BY MEDICARE PART B Following completion of primary series, a booster dose should be given every 10 years to maintain immunity against tetanus  Td may also be given as tetanus wound prophylaxis  Tdap Vaccine - COST NOT COVERED BY MEDICARE PART B Recommended at least once for all adults  For pregnant patients, recommended with each pregnancy     Shingles Vaccine (Shingrix) - COST NOT COVERED BY MEDICARE PART B  2 shot series recommended in those aged 48 and above     Health Maintenance Due:  There are no preventive care reminders to display for this patient  Immunizations Due:      Topic Date Due    Pneumococcal Vaccine: 65+ Years (1 of 2 - PCV13) 02[birth year]/1986     Advance Directives   What are advance directives? Advance directives are legal documents that state your wishes and plans for medical care  These plans are made ahead of time in case you lose your ability to make decisions for yourself  Advance directives can apply to any medical decision, such as the treatments you want, and if you want to donate organs  What are the types of advance directives? There are many types of advance directives, and each state has rules about how to use them  You may choose a combination of any of the following:  · Living will: This is a written record of the treatment you want  You can also choose which treatments you do not want, which to limit, and which to stop at a certain time  This includes surgery, medicine, IV fluid, and tube feedings  · Durable power of  for healthcare [facility]): This is a written record that states who you want to make healthcare choices for you when you are unable to make them for yourself  This person, called a proxy, is usually a family member or a friend  You may choose more than 1 proxy  · Do not resuscitate (DNR) order:  A DNR order is used in case your heart stops beating or you stop breathing  It is a request not to have certain forms of treatment, such as CPR  A DNR order may be included in other types of advance directives  · Medical directive: This covers the care that you want if you are in a coma, near death, or unable to make decisions for yourself  You can list the treatments you want for each condition  Treatment may include pain medicine, surgery, blood transfusions, dialysis, IV or tube feedings, and a ventilator (breathing machine)    · Values history: This document has questions about your views, beliefs, and how you feel and think about life  This information can help others choose the care that you would choose  Why are advance directives important? An advance directive helps you control your care  Although spoken wishes may be used, it is better to have your wishes written down  Spoken wishes can be misunderstood, or not followed  Treatments may be given even if you do not want them  An advance directive may make it easier for your family to make difficult choices about your care  Fall Prevention    Fall prevention  includes ways to make your home and other areas safer  It also includes ways you can move more carefully to prevent a fall  Health conditions that cause changes in your blood pressure, vision, or muscle strength and coordination may increase your risk for falls  Medicines may also increase your risk for falls if they make you dizzy, weak, or sleepy  Fall prevention tips:   · Stand or sit up slowly  · Use assistive devices as directed  · Wear shoes that fit well and have soles that   · Wear a personal alarm  · Stay active  · Manage your medical conditions  Home Safety Tips:  · Add items to prevent falls in the bathroom  · Keep paths clear  · Install bright lights in your home  · Keep items you use often on shelves within reach  · Paint or place reflective tape on the edges of your stairs  © Copyright [employer] 2018 Information is for End User's use only and may not be sold, redistributed or otherwise used for commercial purposes  All illustrations and images included in CareNotes® are the copyrighted property of [employer]  or [facility] Prevention   AMBULATORY CARE:   Fall prevention  includes ways to make your home and other areas safer  It also includes ways you can move more carefully to prevent a fall   Health conditions that cause changes in your blood pressure, vision, or muscle strength and coordination may increase your risk for falls  Medicines may also increase your risk for falls if they make you dizzy, weak, or sleepy  Call 911 or have someone else call if:   · You have fallen and are unconscious  · You have fallen and cannot move part of your body  Contact your healthcare provider if:   · You have fallen and have pain or a headache  · You have questions or concerns about your condition or care  Fall prevention tips:   · Stand or sit up slowly  This may help you keep your balance and prevent falls  · Use assistive devices as directed  Your healthcare provider may suggest that you use a cane or walker to help you keep your balance  You may need to have grab bars put in your bathroom near the toilet or in the shower  · Wear shoes that fit well and have soles that   Wear shoes both inside and outside  Use slippers with good   Do not wear shoes with high heels  · Wear a personal alarm  This is a device that allows you to call 911 if you fall and need help  Ask your healthcare provider for more information  · Stay active  Exercise can help strengthen your muscles and improve your balance  Your healthcare provider may recommend water aerobics or walking  He or she may also recommend physical therapy to improve your coordination  Never start an exercise program without talking to your healthcare provider first      · Manage your medical conditions  Keep all appointments with your healthcare providers  Visit your eye doctor as directed  Home safety tips:   · Add items to prevent falls in the bathroom  Put nonslip strips on your bath or shower floor to prevent you from slipping  Use a bath mat if you do not have carpet in the bathroom  This will prevent you from falling when you step out of the bath or shower  Use a shower seat so you do not need to stand while you shower   Sit on the toilet or a chair in your bathroom to dry yourself and put on clothing  This will prevent you from losing your balance from drying or dressing yourself while you are standing  · Keep paths clear  Remove books, shoes, and other objects from walkways and stairs  Place cords for telephones and lamps out of the way so that you do not need to walk over them  Tape them down if you cannot move them  Remove small rugs  If you cannot remove a rug, secure it with double-sided tape  This will prevent you from tripping  · Install bright lights in your home  Use night lights to help light paths to the bathroom or kitchen  Always turn on the light before you start walking  · Keep items you use often on shelves within reach  Do not use a step stool to help you reach an item  · Paint or place reflective tape on the edges of your stairs  This will help you see the stairs better  Follow up with your healthcare provider as directed:  Write down your questions so you remember to ask them during your visits  © 2017 [address]  Information is for End User's use only and may not be sold, redistributed or otherwise used for commercial purposes  All illustrations and images included in CareNotes® are the copyrighted property of [employer]  or [employer]  The above information is an  only  It is not intended as medical advice for individual conditions or treatments  Talk to your doctor, nurse or pharmacist before following any medical regimen to see if it is safe and effective for you

## 2019-09-19 NOTE — PROGRESS NOTES
Assessment and Plan:     Problem List Items Addressed This Visit        Nervous and Auditory    Weakness of left side of body    Relevant Orders    Ambulatory Referral to [facility] Michael Donnell Iversonalfredautumn       Musculoskeletal and Integument    Arthritis    Relevant Orders    Ambulatory Referral to [address] [place] [address] Michael Donnell Weldon       Other    Shortness of breath on exertion    Bilateral leg edema    Relevant Orders    Pneumatic compression pumps    Compression Stocking    Comprehensive metabolic panel    Mixed hyperlipidemia    Hiatal hernia    History of TIA (transient ischemic attack)    Relevant Orders    Ambulatory Referral to [facility] Michael Donnell Weldon    Numbness of left hand    Relevant Orders    Ambulatory Referral to [address] [place] [address] Michael De Garenard    Frequent falls    Relevant Orders    Ambulatory Referral to [facility] [address] term current use of diuretic    Relevant Orders    Comprehensive metabolic panel    CBC and differential    Diuretic-induced hypokalemia    Relevant Orders    Comprehensive metabolic panel    CBC and differential    Gait instability    Relevant Orders    Ambulatory Referral to [address] [place] [address] Michael De Estebanautumn    Encounter to establish care - Primary          Falls Plan of Care: referral to physical therapy  Recommended assistive device to help with gait and balance  Medications that increase falls were reviewed  Assessed feet and footwear  Home safety evaluation by OT recommended  Preventive health issues were discussed with patient, and age appropriate screening tests were ordered as noted in patient's After Visit Summary  Personalized health advice and appropriate referrals for health education or preventive services given if needed, as noted in patient's After Visit Summary       History of Present Illness:     Patient presents for Medicare Annual Wellness visit    Patient Care Team:  MD Antonina Benjamin MD Esaw Liming, MD Lindajean Kansky, MD     Problem List:     Patient Active Problem List   Diagnosis    Shortness of breath on exertion    Bilateral leg edema    Hypertensive heart disease without heart failure    Diastolic dysfunction    Mixed hyperlipidemia    Nonrheumatic aortic valve insufficiency    Nonrheumatic mitral valve regurgitation    Non-rheumatic aortic stenosis    Nonrheumatic tricuspid valve regurgitation    Cerebrovascular accident (CVA) due to occlusion of left middle cerebral artery (HCC)    Hiatal hernia    Hypercholesterolemia    History of TIA (transient ischemic attack)    Numbness of left hand    Frequent falls    Long term current use of diuretic    Diuretic-induced hypokalemia    Gait instability    Arthritis    Weakness of left side of body    Encounter to establish care      Past Medical and Surgical History:     Past Medical History:   Diagnosis Date    Aortic stenosis     Arthritis     Asthma     Chest pain     Chronic lower back pain     COPD (chronic obstructive pulmonary disease) (HCC)     Esophageal reflux     Hyperlipidemia     Hypertension     Varicose veins of leg with edema, unspecified laterality      Past Surgical History:   Procedure Laterality Date    APPENDECTOMY      CATARACT EXTRACTION      CHOLECYSTECTOMY        Family History:     Family History   Problem Relation Age of Onset    Tuberculosis Mother     Heart attack Father     Hypertension Son     Hyperlipidemia Son     Hypertension Brother     Colon cancer Brother       Social History:     Social History     Socioeconomic History    Marital status:      Spouse name: None    Number of children: None    Years of education: None    Highest education level: None   Occupational History    Occupation: Retired    Social Needs    Financial resource strain: None    Food insecurity:     Worry: None     Inability: None    Transportation needs:     Medical: None     Non-medical: None   Tobacco Use    Smoking status: Never Smoker    Smokeless tobacco: Never Used   Substance and Sexual Activity    Alcohol use:  No  Drug use: No    Sexual activity: Never   Lifestyle    Physical activity:     Days per week: None     Minutes per session: None    Stress: None   Relationships    Social connections:     Talks on phone: None     Gets together: None     Attends Gnosticist service: None     Active member of club or organization: None     Attends meetings of clubs or organizations: None     Relationship status: None    Intimate partner violence:     Fear of current or ex partner: None     Emotionally abused: None     Physically abused: None     Forced sexual activity: None   Other Topics Concern    None   Social History Narrative    Lives with adult child; daughter, Liliane Dan        Medications and Allergies:     Current Outpatient Medications   Medication Sig Dispense Refill    ciprofloxacin (CIPRO) 250 mg tablet       furosemide (LASIX) 20 mg tablet 2 tab twice a day 120 tablet 3    Multiple Vitamins-Minerals (CHOICEFUL MULTIVITAMIN) CAPS Take 1 capsule by mouth daily      Potassium Chloride ER 20 MEQ TBCR 1 tab twice a day 60 tablet 4     No current facility-administered medications for this visit  Allergies   Allergen Reactions    Codeine      Reaction Date: [id number];     Pollen Extract Other (See Comments)      Immunizations: There is no immunization history for the selected administration types on file for this patient  Health Maintenance: There are no preventive care reminders to display for this patient  Topic Date Due    Pneumococcal Vaccine: 65+ Years (1 of 2 - PCV13) 02[birth year]/1986      Medicare Health Risk Assessment:     /82 (BP Location: Left arm, Patient Position: Sitting)   Pulse 62   Resp 18   Ht 5' (1 524 m)   Wt 57 6 kg (127 lb)   SpO2 95%   BMI 24 80 kg/m²      Annette Delaney is here for her Subsequent Wellness visit  Health Risk Assessment:   Patient rates overall health as fair  Patient feels that their physical health rating is slightly worse   Eyesight was rated as slightly worse  Hearing was rated as slightly worse  Patient feels that their emotional and mental health rating is slightly worse  Pain experienced in the last 7 days has been some  Patient's pain rating has been 5/10  Patient states that she has experienced no weight loss or gain in last 6 months  Depression Screening:   PHQ-2 Score: 0      Fall Risk Screening: In the past year, patient has experienced: history of falling in past year    Number of falls: 2 or more  Injured during fall?: No    Feels unsteady when standing or walking?: Yes    Worried about falling?: Yes      Urinary Incontinence Screening:   Patient has not leaked urine accidently in the last six months  Home Safety:  Patient has trouble with stairs inside or outside of their home  Patient has working smoke alarms and has working carbon monoxide detector  Home safety hazards include: none  Nutrition:   Current diet is Regular  Medications:   Patient is not currently taking any over-the-counter supplements  Patient is not able to manage medications  Activities of Daily Living (ADLs)/Instrumental Activities of Daily Living (IADLs):   Walk and transfer into and out of bed and chair?: No  Dress and groom yourself?: No    Bathe or shower yourself?: No    Feed yourself?  Yes  Do your laundry/housekeeping?: No  Manage your money, pay your bills and track your expenses?: No  Make your own meals?: No    Do your own shopping?: No    Previous Hospitalizations:   Any hospitalizations or ED visits within the last 12 months?: No      Advance Care Planning:   Living will: No    Durable POA for healthcare: No    Advanced directive counseling given: Yes    Five wishes given: Yes    End of Life Decisions reviewed with patient: No      Cognitive Screening:   Provider or family/friend/caregiver concerned regarding cognition?: No    PREVENTIVE SCREENINGS      Cardiovascular Screening:    General: Screening Not Indicated and History Lipid Disorder Diabetes Screening:     General: Screening Not Indicated      Colorectal Cancer Screening:     General: Screening Not Indicated      Breast Cancer Screening:     General: Screening Not Indicated      Cervical Cancer Screening:    General: Screening Not Indicated      Osteoporosis Screening:    General: Screening Not Indicated      Abdominal Aortic Aneurysm (AAA) Screening:        General: Screening Not Indicated      Lung Cancer Screening:     General: Screening Not Indicated      Hepatitis C Screening:    General: Screening Not Indicated    Other Counseling Topics:   Car/seat belt/driving safety, skin self-exam and regular weightbearing exercise  ADEOLA Muller     Falls Plan of Care: Balance, strength, and gait training instructions were provided  Patient referred to physical therapy

## 2019-09-23 ENCOUNTER — TELEPHONE (OUTPATIENT)
Dept: PALLIATIVE MEDICINE | Facility: CLINIC | Age: 84
End: 2019-09-23

## 2019-09-23 ENCOUNTER — TELEPHONE (OUTPATIENT)
Dept: FAMILY MEDICINE CLINIC | Facility: CLINIC | Age: 84
End: 2019-09-23

## 2019-09-23 NOTE — TELEPHONE ENCOUNTER
Home health calls stating that she is on board for OT, PT, and a [address]  Also she had 2 falls 1 - Wednesday no injury and 1 this morning, she has an abrasion left upper arm - they are treating with bactracin

## 2019-09-23 NOTE — TELEPHONE ENCOUNTER
Patients Daughter Nhi Pascual called this office asking about the Palliative Care at home program  This patient does not currently have a life-limiting disease such as heart failure, Severe copd, severe kidney disease or Cancer  She was referred to  [facility] by family md on 9/19/19 and this nurse explained home health services to daughter Nhi Pascual as well as other community resources  She was appreciative   Hola Castillosin

## 2019-09-26 ENCOUNTER — TELEPHONE (OUTPATIENT)
Dept: FAMILY MEDICINE CLINIC | Facility: CLINIC | Age: 84
End: 2019-09-26

## 2019-09-26 NOTE — TELEPHONE ENCOUNTER
Also Josefina called and Benji Solomon fell out of bed  The family found her laying on the floor  At this time there is no bruising, however the pain level is 8/10 and the worse the family has seen  They were wondering if an ibprofin or pain medication could be called in     Josefina will start her Physical therapy next week twice a week for 4/5 weeks

## 2019-09-27 NOTE — TELEPHONE ENCOUNTER
She cannot go into a rehab or snf unless she goes in from the hospital  That is my concern  Maybe the [facility] has a  that can help?

## 2019-09-27 NOTE — TELEPHONE ENCOUNTER
Family notified  They said she seems to a little better today  But they were talking and wondered about a rehab facility for short term but not sure how to go about that

## 2019-09-27 NOTE — TELEPHONE ENCOUNTER
If Amelie Healy fell out of bed, she needs to be evaluated in ER or in the office  I also have bog concerns because I have only seen her one time and there have been calls from [facility] about her falling almost daily  Jimenez being at home is not best for her?

## 2019-09-27 NOTE — TELEPHONE ENCOUNTER
Notified patients family and they stated she seems better today they were concerned if she would fall in the wrong place  [facility] does have [facility] that comes to the house and they are watching her closely  Advised the ER if anything becomes concerning and to speak with [facility] as well  Call if any changes

## 2019-10-01 ENCOUNTER — TELEPHONE (OUTPATIENT)
Dept: FAMILY MEDICINE CLINIC | Facility: CLINIC | Age: 84
End: 2019-10-01

## 2019-10-01 NOTE — TELEPHONE ENCOUNTER
Family is requesting an upright walker, a rollator  The standard walker is not appropriate  Upon looking on the web he found a [employer] folding rollator walker  If this could be ordered and the Rx sent to the family he will get it from them and try to get it covered      Any questions give him a call

## 2019-10-07 DIAGNOSIS — R29.6 FREQUENT FALLS: Primary | ICD-10-CM

## 2019-10-07 DIAGNOSIS — R26.81 GAIT INSTABILITY: ICD-10-CM

## 2019-10-09 ENCOUNTER — TELEPHONE (OUTPATIENT)
Dept: FAMILY MEDICINE CLINIC | Facility: CLINIC | Age: 84
End: 2019-10-09

## 2019-10-09 NOTE — TELEPHONE ENCOUNTER
Earnestine Grewal from [facility] called      She saw her today, was wondering id she could get something stronger for her back pain and arthritis  Right now she is alternating  asaprin and ibuprofen and this is not helping    Call Earnestine Grewal at [phone]

## 2019-10-14 ENCOUNTER — TELEPHONE (OUTPATIENT)
Dept: FAMILY MEDICINE CLINIC | Facility: CLINIC | Age: 84
End: 2019-10-14

## 2019-10-14 NOTE — TELEPHONE ENCOUNTER
Please call daughter in law Renard Seip [phone] in regards to [facility]   She only wants to speak with you

## 2019-10-16 NOTE — TELEPHONE ENCOUNTER
I spoke with [facility]  Home health is coming in to bathe patient and this is working out well  Casey Still has bonded with her  She comes in once a week to the home  Casey Still is also having home PT and OT  Daughter in law is concerned about how long insurance will pay for in home therapy  There has not been much progress with OT and this will be coming to the end  I advised that this is insurance regulated and the VNA agency will be best to answer those questions

## 2019-10-18 ENCOUNTER — TELEPHONE (OUTPATIENT)
Dept: FAMILY MEDICINE CLINIC | Facility: CLINIC | Age: 84
End: 2019-10-18

## 2019-10-18 NOTE — TELEPHONE ENCOUNTER
Patricia an occupational therapist left a message at 1:36pm   She was wondering if you could but in an order for a  to come out to the patient's home to discuss resources       For further information, Curtis Mireles can be reached at [phone]

## 2019-10-19 DIAGNOSIS — R29.6 FREQUENT FALLS: Primary | ICD-10-CM

## 2019-10-19 DIAGNOSIS — R53.1 WEAKNESS OF LEFT SIDE OF BODY: ICD-10-CM

## 2019-10-19 DIAGNOSIS — R26.81 GAIT INSTABILITY: ICD-10-CM

## 2019-10-19 DIAGNOSIS — R60.0 BILATERAL LEG EDEMA: ICD-10-CM

## 2019-10-19 DIAGNOSIS — R20.0 NUMBNESS OF LEFT HAND: ICD-10-CM

## 2019-10-19 DIAGNOSIS — Z86.73 HISTORY OF TIA (TRANSIENT ISCHEMIC ATTACK): ICD-10-CM

## 2019-10-19 DIAGNOSIS — R06.02 SHORTNESS OF BREATH ON EXERTION: ICD-10-CM

## 2019-10-23 ENCOUNTER — TELEPHONE (OUTPATIENT)
Dept: FAMILY MEDICINE CLINIC | Facility: CLINIC | Age: 84
End: 2019-10-23

## 2019-10-23 DIAGNOSIS — G89.29 CHRONIC BILATERAL LOW BACK PAIN WITHOUT SCIATICA: Primary | ICD-10-CM

## 2019-10-23 DIAGNOSIS — M54.50 CHRONIC BILATERAL LOW BACK PAIN WITHOUT SCIATICA: Primary | ICD-10-CM

## 2019-10-23 RX ORDER — LIDOCAINE 50 MG/G
1 PATCH TOPICAL DAILY
Qty: 30 PATCH | Refills: 3 | Status: SHIPPED | OUTPATIENT
Start: 2019-10-23 | End: 2020-01-20

## 2019-10-23 NOTE — TELEPHONE ENCOUNTER
Miriam Ware has been complaining of back pain and her daughter in law has been giving her ibprophen and wondered if there is anything stronger that you can prescribe her   Call her daughter in law Fisher at [phone]

## 2019-10-24 ENCOUNTER — TELEPHONE (OUTPATIENT)
Dept: FAMILY MEDICINE CLINIC | Facility: CLINIC | Age: 84
End: 2019-10-24

## 2019-10-24 DIAGNOSIS — G89.29 CHRONIC BILATERAL LOW BACK PAIN WITHOUT SCIATICA: Primary | ICD-10-CM

## 2019-10-24 DIAGNOSIS — M54.50 CHRONIC BILATERAL LOW BACK PAIN WITHOUT SCIATICA: Primary | ICD-10-CM

## 2019-10-24 RX ORDER — MELOXICAM 7.5 MG/1
7.5 TABLET ORAL DAILY
Qty: 90 TABLET | Refills: 3 | Status: SHIPPED | OUTPATIENT
Start: 2019-10-24 | End: 2020-01-20

## 2019-10-24 NOTE — TELEPHONE ENCOUNTER
Spoke with Daughter in law  Lidocaine  5% patches are not covered  Advised to try Lidocaine 4% OTC  She said she would try them but asks for something orally   Tylenol and advil don't always help

## 2019-10-24 NOTE — TELEPHONE ENCOUNTER
I sent script for meloxicam which is to be taken twice a day every day  She cannot have narcotic due to her age and history of falls   Other than lidocaine and Meloxicam, there are really no other options

## 2019-11-05 ENCOUNTER — TELEPHONE (OUTPATIENT)
Dept: FAMILY MEDICINE CLINIC | Facility: CLINIC | Age: 84
End: 2019-11-05

## 2019-11-05 NOTE — TELEPHONE ENCOUNTER
Wife of POA called and stated that they would like for only the POA and herself be talked to from here on out abut the patients medical chart  I did ask if there was a copy of POA and possible list of people to not be discussed with and she will provide that for us and Katelyn Carpio will take care of the rest on our end    TM

## 2020-01-13 ENCOUNTER — TELEPHONE (OUTPATIENT)
Dept: FAMILY MEDICINE CLINIC | Facility: CLINIC | Age: 85
End: 2020-01-13

## 2020-01-13 DIAGNOSIS — I51.89 DIASTOLIC DYSFUNCTION: ICD-10-CM

## 2020-01-13 DIAGNOSIS — R29.6 FREQUENT FALLS: ICD-10-CM

## 2020-01-13 DIAGNOSIS — R06.02 SHORTNESS OF BREATH ON EXERTION: Primary | ICD-10-CM

## 2020-01-13 DIAGNOSIS — R26.2 AMBULATORY DYSFUNCTION: Primary | ICD-10-CM

## 2020-01-13 DIAGNOSIS — R26.81 GAIT INSTABILITY: ICD-10-CM

## 2020-01-13 DIAGNOSIS — I34.0 NONRHEUMATIC MITRAL VALVE REGURGITATION: ICD-10-CM

## 2020-01-13 DIAGNOSIS — I63.512 CEREBROVASCULAR ACCIDENT (CVA) DUE TO OCCLUSION OF LEFT MIDDLE CEREBRAL ARTERY (HCC): ICD-10-CM

## 2020-01-13 DIAGNOSIS — I35.1 NONRHEUMATIC AORTIC VALVE INSUFFICIENCY: ICD-10-CM

## 2020-01-13 DIAGNOSIS — I11.9 HYPERTENSIVE HEART DISEASE WITHOUT HEART FAILURE: ICD-10-CM

## 2020-01-13 DIAGNOSIS — R53.1 WEAKNESS OF LEFT SIDE OF BODY: ICD-10-CM

## 2020-01-13 DIAGNOSIS — Z86.73 HISTORY OF TIA (TRANSIENT ISCHEMIC ATTACK): ICD-10-CM

## 2020-01-13 NOTE — TELEPHONE ENCOUNTER
Daughter called- said residential didn't have anything on file  I refaxed and told her about ER and daughter didn't want to take b/c she is 80years old

## 2020-01-13 NOTE — TELEPHONE ENCOUNTER
Home health order in chart   Advise to monitor only if any symptoms she needs an appt or go to the ER if severe

## 2020-01-13 NOTE — TELEPHONE ENCOUNTER
Saritha from [facility] called and patient fell this morning  She is able to walk and go up and jacqueline stairs  However, She does have a tear on her left arm 8 x6 5cm and the physical therapist would like an order for home nurse care  She also wanted to know what she should do right now until they can get a home nurse out there  Please advise

## 2020-01-13 NOTE — PROGRESS NOTES
Please complete the attached order for skilled nursing  I was unsure what you wanted listed as far as what the nurses could do  This is the referral that is needed and needs to be faxed to [phone]  Also the Physical Therapist is asking if they should dress the wound that she got from her fall today?   It is open and the daughter will not take her to the ER

## 2020-01-14 ENCOUNTER — TELEPHONE (OUTPATIENT)
Dept: FAMILY MEDICINE CLINIC | Facility: CLINIC | Age: 85
End: 2020-01-14

## 2020-01-14 NOTE — TELEPHONE ENCOUNTER
Melyssa Cardona calls stating patient has a tear on  LUE  They are calling to get verbal orders approved - vaseline gauze 4x4s to be applied to skintear til healed,  Per Dr Governor Pizano - yes that is ok to do   Call if anything else arises

## 2020-01-16 NOTE — PROGRESS NOTES
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 heart failure  -     Comprehensive metabolic panel; Future  -     Lipid panel; Future    Cerebrovascular accident (CVA) due to occlusion of left middle cerebral artery (HCC)  -     Comprehensive metabolic panel; Future  -     Lipid panel; Future    Arthritis  -     Comprehensive metabolic panel; Future  -     Lipid panel; Future    Bilateral leg edema  -     Comprehensive metabolic panel; Future  -     Lipid panel; Future    Diastolic dysfunction  -     Comprehensive metabolic panel; Future  -     Lipid panel; Future    Mixed hyperlipidemia  -     Comprehensive metabolic panel; Future  -     Lipid panel; Future    Hypercholesterolemia  -     Comprehensive metabolic panel; Future  -     Lipid panel; Future    History of TIA (transient ischemic attack)  -     Comprehensive metabolic panel; Future  -     Lipid panel; Future    Frequent falls  -     Comprehensive metabolic panel; Future  -     Lipid panel; Future    Gait instability    Left arm pain  -     XR shoulder 2+ vw left; Future  -     XR humerus left; Future    Fall in home, initial encounter  -     XR shoulder 2+ vw left; Future  -     XR humerus left; Future        No problem-specific Assessment & Plan notes found for this encounter  Subjective:      Patient ID: Cesar Smith is a 80 y o  female  Patient is here for follow up  She is accompanied by her daughter  She lives with her daughter  Physical therapy was coming to the house  PT ended in October  Patient had decline immediately  Daughter reports that she "suddenly stopped walking"  She was taken to the hospital and admitted for a few days  She then went to [email or web address] for [facility]hab  She seemed to progress in the standpoint that she was walking  She left [place] December 17 and returned to her daughters home  She has been receiving physical therapy twice a week  She fell last week on Tuesday  She fell out of bed  She has " a nasty skin tear on her left arm"  The patient is moaning and holding her arm today  Daughter states the pain "has been there since day 1"  Daughter called this office at time of injury and she was advised to go to the ER  Daughter states "that was not an option because were not going to wait for hours in the Er"  Daughter reports that a visiting nurse was sent to the house who ordered dressing changes  She has vaseline gauze and dry wrap  She does still have her bedroom on the second floor  Daughter helps her up the stairs  During the day, she is on the first floor and uses her walker  Declines influenza vaccination  Daughter wants to check with her  who is POA before having Pneumococcal vacciantion    Labs done 12/3 at [facility] were normal         Diast CHF-asymptomatic  Hypertensive heart disease-stable BP  Followed by Dr Chetna Grande-? status  Frequent falls  Recent stay at [address]  Back at home  Having physical therapy twice a week  Edema legs- lasix stopped at [address]  Edema imprved with pneumatic compression device  She is also wearing TEDS          The following portions of the patient's history were reviewed and updated as appropriate:   She has a past medical history of Aortic stenosis, Arthritis, Asthma, Chest pain, Chronic lower back pain, COPD (chronic obstructive pulmonary disease) ([facility] [address] ), Esophageal reflux, Hyperlipidemia, Hypertension, and Varicose veins of leg with edema, unspecified laterality  ,  does not have any pertinent problems on file  ,   has a past surgical history that includes Appendectomy; Cholecystectomy; and Cataract extraction  ,  family history includes Colon cancer in her brother; Heart attack in her father; Hyperlipidemia in her son; Hypertension in her brother and son; Tuberculosis in her mother  ,   reports that she has never smoked  She has never used smokeless tobacco  She reports that she does not drink alcohol or use drugs  ,  is allergic to codeine and pollen extract     No current outpatient medications on file  No current facility-administered medications for this visit  Review of Systems   Constitutional: Negative  Negative for fatigue and fever  HENT: Negative  Negative for congestion  Eyes: Negative  Negative for visual disturbance  Respiratory: Negative for cough, chest tightness, shortness of breath and wheezing  Cardiovascular: Positive for leg swelling  Negative for chest pain and palpitations  Gastrointestinal: Negative  Negative for abdominal pain, blood in stool, diarrhea and nausea  Endocrine: Negative for polydipsia, polyphagia and polyuria  Genitourinary: Negative for difficulty urinating and flank pain  Musculoskeletal: Positive for arthralgias and gait problem  Negative for back pain and myalgias  Acute pain of left arm   Skin: Positive for wound  Negative for color change, pallor and rash  Left upper arm skin tear   Allergic/Immunologic: Negative for immunocompromised state  Neurological: Positive for weakness  Negative for dizziness, light-headedness, numbness and headaches  Hematological: Negative for adenopathy  Bruises/bleeds easily  Psychiatric/Behavioral: Negative  Negative for confusion, decreased concentration and sleep disturbance  All other systems reviewed and are negative  Objective:  Vitals:    01/20/20 1222   BP: 116/82   Pulse: 70   Temp: 98 7 °F (37 1 °C)   SpO2: 94%   Weight: 57 6 kg (127 lb)   Height: 5' (1 524 m)     Body mass index is 24 8 kg/m²  Physical Exam   Constitutional: She is oriented to person, place, and time  She appears well-developed and well-nourished  No distress  HENT:   Head: Normocephalic and atraumatic  Nose: Nose normal    Mouth/Throat: No oropharyngeal exudate  Eyes: Pupils are equal, round, and reactive to light  Conjunctivae are normal  No scleral icterus  Neck: Normal range of motion  Neck supple   No JVD present  No thyromegaly present  Cardiovascular: Normal rate, regular rhythm and intact distal pulses  Exam reveals no gallop and no friction rub  Murmur heard  Pulmonary/Chest: Effort normal and breath sounds normal  No respiratory distress  She exhibits no tenderness  Abdominal: Soft  Bowel sounds are normal  She exhibits no distension  There is no tenderness  Musculoskeletal: She exhibits edema, tenderness and deformity  Edema improved ble  She is wearing compression hose and using pneumatic compression device  Holding left upper arm and moaning  Possibly upper arm deformity--daughter reports this is normal shape of her arm  Lymphadenopathy:     She has no cervical adenopathy  Neurological: She is alert and oriented to person, place, and time  Coordination normal    Skin: Skin is warm and dry  Capillary refill takes less than 2 seconds  No rash noted  She is not diaphoretic  There is erythema  Large skin tear left upper arm  Abnormal shaped skin tear left upper arm measuring 3 inches x 2 inches  Open to air  I applied nonadhering dressing  Psychiatric: She has a normal mood and affect  Her behavior is normal  Judgment and thought content normal    Nursing note and vitals reviewed

## 2020-01-20 ENCOUNTER — APPOINTMENT (OUTPATIENT)
Dept: RADIOLOGY | Facility: CLINIC | Age: 85
End: 2020-01-20
Payer: MEDICARE

## 2020-01-20 ENCOUNTER — OFFICE VISIT (OUTPATIENT)
Dept: FAMILY MEDICINE CLINIC | Facility: CLINIC | Age: 85
End: 2020-01-20
Payer: MEDICARE

## 2020-01-20 VITALS
HEART RATE: 70 BPM | WEIGHT: 127 LBS | BODY MASS INDEX: 24.94 KG/M2 | OXYGEN SATURATION: 94 % | HEIGHT: 60 IN | TEMPERATURE: 98.7 F | DIASTOLIC BLOOD PRESSURE: 82 MMHG | SYSTOLIC BLOOD PRESSURE: 116 MMHG
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
## 2020-01-20 DIAGNOSIS — I51.89 DIASTOLIC DYSFUNCTION: ICD-10-CM

## 2020-01-20 DIAGNOSIS — M79.602 LEFT ARM PAIN: ICD-10-CM

## 2020-01-20 DIAGNOSIS — Z86.73 HISTORY OF TIA (TRANSIENT ISCHEMIC ATTACK): ICD-10-CM

## 2020-01-20 DIAGNOSIS — I11.9 HYPERTENSIVE HEART DISEASE WITHOUT HEART FAILURE: Primary | ICD-10-CM

## 2020-01-20 DIAGNOSIS — R60.0 BILATERAL LEG EDEMA: ICD-10-CM

## 2020-01-20 DIAGNOSIS — M19.90 ARTHRITIS: ICD-10-CM

## 2020-01-20 DIAGNOSIS — E78.00 HYPERCHOLESTEROLEMIA: ICD-10-CM

## 2020-01-20 DIAGNOSIS — R26.81 GAIT INSTABILITY: ICD-10-CM

## 2020-01-20 DIAGNOSIS — E78.2 MIXED HYPERLIPIDEMIA: ICD-10-CM

## 2020-01-20 DIAGNOSIS — I63.512 CEREBROVASCULAR ACCIDENT (CVA) DUE TO OCCLUSION OF LEFT MIDDLE CEREBRAL ARTERY (HCC): ICD-10-CM
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
## 2020-01-20 DIAGNOSIS — R29.6 FREQUENT FALLS: ICD-10-CM

## 2020-01-20 PROCEDURE — 99214 OFFICE O/P EST MOD 30 MIN: CPT | Performed by: NURSE PRACTITIONER

## 2020-01-20 PROCEDURE — 73030 X-RAY EXAM OF SHOULDER: CPT

## 2020-01-20 PROCEDURE — 73060 X-RAY EXAM OF HUMERUS: CPT

## 2020-01-20 NOTE — PATIENT INSTRUCTIONS
Follow up visit  Diast CHF-asymptomatic  Hypertensive heart disease-stable BP  Followed by Dr Heather Whitman-? status  Frequent falls  Recent stay at [address]  Back at home  Having physical therapy twice a week  Left upper arm pain- recent fall at home  Obtain xray of the humerus   Tylenol for pain  Our office will call with xray results  Follow up in office in 4 months  Deferred Influzenza vaccination and Pneumonia vaccination

## 2020-02-07 ENCOUNTER — OFFICE VISIT (OUTPATIENT)
Dept: FAMILY MEDICINE CLINIC | Facility: CLINIC | Age: 85
End: 2020-02-07
Payer: MEDICARE

## 2020-02-07 VITALS
HEIGHT: 60 IN | OXYGEN SATURATION: 93 % | HEART RATE: 67 BPM | TEMPERATURE: 99.5 F | SYSTOLIC BLOOD PRESSURE: 122 MMHG | DIASTOLIC BLOOD PRESSURE: 64 MMHG | BODY MASS INDEX: 24.8 KG/M2

## 2020-02-07 DIAGNOSIS — H02.89 IRRITATION OF EYELID: Primary | ICD-10-CM

## 2020-02-07 PROCEDURE — 1160F RVW MEDS BY RX/DR IN RCRD: CPT | Performed by: FAMILY MEDICINE

## 2020-02-07 PROCEDURE — 1036F TOBACCO NON-USER: CPT | Performed by: FAMILY MEDICINE

## 2020-02-07 PROCEDURE — 3074F SYST BP LT 130 MM HG: CPT | Performed by: FAMILY MEDICINE

## 2020-02-07 PROCEDURE — 3078F DIAST BP <80 MM HG: CPT | Performed by: FAMILY MEDICINE

## 2020-02-07 PROCEDURE — 99213 OFFICE O/P EST LOW 20 MIN: CPT | Performed by: FAMILY MEDICINE

## 2020-02-07 NOTE — PROGRESS NOTES
Jagdish Marie 2/8/1921 female MRN: [id number]      ASSESSMENT/PLAN  Problem List Items Addressed This Visit        Other    Irritation of eyelid - Primary        No drainage, conjunctival erythema to suggest pink eye  No stye noted  Possible allergic reaction vs periorbital cellulitis  Called [facility] and pt scheduled for appointment at 3:30 this afternoon  Pt can trial OTC Zyrtec or similar for manage itching symptoms  Future Appointments   Date Time Provider Germania Vu   2/11/2020 11:20 AM MD [facility]   6/8/2020 11:00 AM Ames Lundborg, CRNP [facility]          SUBJECTIVE  CC: Conjunctivitis (pt has concerns with both eyes, causes burning irration and has been itchy x 2 days)      HPI:  Jagdish Marie is a 80 y o  female who presents with her son for an acute visit due to concern for conjunctivitis  2 days:   B/L eye itching   No drainage, pain, photosensitivity, vision changes   Tried allergy drops without relief       Review of Systems   Constitutional: Negative for fever  Eyes: Positive for redness (eyelid only) and itching  Negative for photophobia, pain, discharge and visual disturbance         Historical Information   The patient history was reviewed and updated as follows:    Past Medical History:   Diagnosis Date    Aortic stenosis     Arthritis     Asthma     Chest pain     Chronic lower back pain     COPD (chronic obstructive pulmonary disease) (HCC)     Esophageal reflux     Hyperlipidemia     Hypertension     Varicose veins of leg with edema, unspecified laterality      Past Surgical History:   Procedure Laterality Date    APPENDECTOMY      CATARACT EXTRACTION      CHOLECYSTECTOMY       Family History   Problem Relation Age of Onset    Tuberculosis Mother     Heart attack Father     Hypertension Son     Hyperlipidemia Son     Hypertension Brother     Colon cancer Brother       Social History   Social History     Substance and Sexual Activity   Alcohol Use No     Social History     Substance and Sexual Activity   Drug Use No     Social History     Tobacco Use   Smoking Status Never Smoker   Smokeless Tobacco Never Used       Medications:   No current outpatient medications on file  Allergies   Allergen Reactions    Acetazolamide     Codeine      Reaction Date: [id number];     Pollen Extract Other (See Comments)       OBJECTIVE    Vitals:   Vitals:    02/07/20 1048   BP: 122/64   Pulse: 67   Temp: 99 5 °F (37 5 °C)   SpO2: 93%   Height: 5' (1 524 m)           Physical Exam   Constitutional: She appears well-developed and well-nourished  No distress  HENT:   Head: Normocephalic and atraumatic  Eyes: EOM are normal  Right eye exhibits no hordeolum  Right conjunctiva is not injected  Left conjunctiva is not injected  R pupil small, less reactive than L -- per pt's family, this is due to a past stroke and has been present for many years    Pulmonary/Chest: Effort normal    Neurological: She is alert  Psychiatric: She has a normal mood and affect  Vitals reviewed                   DO Willy [facility]   2/7/2020  11:13 AM

## 2020-02-10 RX ORDER — BISACODYL 10 MG
SUPPOSITORY, RECTAL RECTAL DAILY
COMMUNITY
End: 2021-06-30

## 2020-02-10 RX ORDER — SODIUM PHOSPHATE, DIBASIC AND SODIUM PHOSPHATE, MONOBASIC 7; 19 G/133ML; G/133ML
ENEMA RECTAL
COMMUNITY
End: 2021-06-30

## 2020-02-10 RX ORDER — LANOLIN ALCOHOL/MO/W.PET/CERES
CREAM (GRAM) TOPICAL DAILY
COMMUNITY

## 2020-02-10 RX ORDER — BRIMONIDINE TARTRATE 2 MG/ML
SOLUTION/ DROPS OPHTHALMIC
COMMUNITY

## 2020-02-10 RX ORDER — ACETAMINOPHEN 325 MG/1
TABLET ORAL EVERY 4 HOURS
COMMUNITY

## 2020-02-11 ENCOUNTER — OFFICE VISIT (OUTPATIENT)
Dept: CARDIOLOGY CLINIC | Facility: CLINIC | Age: 85
End: 2020-02-11
Payer: MEDICARE

## 2020-02-11 VITALS
DIASTOLIC BLOOD PRESSURE: 60 MMHG | HEIGHT: 60 IN | SYSTOLIC BLOOD PRESSURE: 128 MMHG | WEIGHT: 127 LBS | BODY MASS INDEX: 24.94 KG/M2 | HEART RATE: 65 BPM | OXYGEN SATURATION: 96 %

## 2020-02-11 DIAGNOSIS — I35.0 NON-RHEUMATIC AORTIC STENOSIS: ICD-10-CM

## 2020-02-11 DIAGNOSIS — E78.2 MIXED HYPERLIPIDEMIA: ICD-10-CM

## 2020-02-11 DIAGNOSIS — I11.9 HYPERTENSIVE HEART DISEASE WITHOUT HEART FAILURE: ICD-10-CM

## 2020-02-11 DIAGNOSIS — I36.1 NONRHEUMATIC TRICUSPID VALVE REGURGITATION: ICD-10-CM

## 2020-02-11 DIAGNOSIS — I35.1 NONRHEUMATIC AORTIC VALVE INSUFFICIENCY: ICD-10-CM

## 2020-02-11 DIAGNOSIS — I34.0 NONRHEUMATIC MITRAL VALVE REGURGITATION: ICD-10-CM

## 2020-02-11 DIAGNOSIS — I10 ESSENTIAL HYPERTENSION: ICD-10-CM

## 2020-02-11 DIAGNOSIS — R60.0 BILATERAL LEG EDEMA: Primary | ICD-10-CM

## 2020-02-11 DIAGNOSIS — I51.89 DIASTOLIC DYSFUNCTION: ICD-10-CM

## 2020-02-11 PROCEDURE — 3008F BODY MASS INDEX DOCD: CPT | Performed by: INTERNAL MEDICINE

## 2020-02-11 PROCEDURE — 1160F RVW MEDS BY RX/DR IN RCRD: CPT | Performed by: INTERNAL MEDICINE

## 2020-02-11 PROCEDURE — 1036F TOBACCO NON-USER: CPT | Performed by: INTERNAL MEDICINE

## 2020-02-11 PROCEDURE — 3074F SYST BP LT 130 MM HG: CPT | Performed by: INTERNAL MEDICINE

## 2020-02-11 PROCEDURE — 99214 OFFICE O/P EST MOD 30 MIN: CPT | Performed by: INTERNAL MEDICINE

## 2020-02-11 PROCEDURE — 3078F DIAST BP <80 MM HG: CPT | Performed by: INTERNAL MEDICINE

## 2020-02-11 NOTE — PROGRESS NOTES
[facility] [place] [id number] [facility] [place] [address]  Cardiology Consultation     Roni Shaffer  [id number]  2/8/1921      No diagnosis found  Chief Complaint   Patient presents with    Follow-up       HPI:  49-year-old female who comes for follow-up  Daughter states that patient is doing reasonably well recently  Walks with the help of a walker and does not complain of chest pain or shortness of breath, palpitations, lightheadedness, syncope, orthopnea, PND  Her leg swelling is currently controlled with compression stockings without need of any diuretic medication  HTN, HHD -  Has been hypertensive for many years  Currently BP is stable without need of any antihypertensive medication  Denies headache, medication side effects        HLP -  Has had hyperlipidemia for many years  Currently on diet control  PCP closely monitoring the blood work      AS, AR, MR, TR - stable, mild but together might be contributing to her SOB    Bilateral leg edema - on furosemide prn    Using venous compression stockings currently      Patient Active Problem List   Diagnosis    Shortness of breath on exertion    Bilateral leg edema    Hypertensive heart disease without heart failure    Diastolic dysfunction    Mixed hyperlipidemia    Nonrheumatic aortic valve insufficiency    Nonrheumatic mitral valve regurgitation    Non-rheumatic aortic stenosis    Nonrheumatic tricuspid valve regurgitation    Cerebrovascular accident (CVA) due to occlusion of left middle cerebral artery (HCC)    Hiatal hernia    Hypercholesterolemia    History of TIA (transient ischemic attack)    Numbness of left hand    Frequent falls    Long term current use of diuretic    Diuretic-induced hypokalemia    Gait instability    Arthritis    Weakness of left side of body    Encounter to establish care    Left arm pain    Fall at home    Irritation of eyelid     Past Medical History:   Diagnosis Date    Aortic stenosis     Arthritis     Asthma     Chest pain     Chronic lower back pain     COPD (chronic obstructive pulmonary disease) (HCC)     Esophageal reflux     Hyperlipidemia     Hypertension     Varicose veins of leg with edema, unspecified laterality      Social History     Socioeconomic History    Marital status:       Spouse name: Not on file    Number of children: Not on file    Years of education: Not on file    Highest education level: Not on file   Occupational History    Occupation: Retired    Social Needs    Financial resource strain: Not on file    Food insecurity:     Worry: Not on file     Inability: Not on file   [employer] needs:     Medical: Not on file     Non-medical: Not on file   Tobacco Use    Smoking status: Never Smoker    Smokeless tobacco: Never Used   Substance and Sexual Activity    Alcohol use: No    Drug use: No    Sexual activity: Never   Lifestyle    Physical activity:     Days per week: Not on file     Minutes per session: Not on file    Stress: Not on file   Relationships    Social connections:     Talks on phone: Not on file     Gets together: Not on file     Attends Evangelical service: Not on file     Active member of club or organization: Not on file     Attends meetings of clubs or organizations: Not on file     Relationship status: Not on file    Intimate partner violence:     Fear of current or ex partner: Not on file     Emotionally abused: Not on file     Physically abused: Not on file     Forced sexual activity: Not on file   Other Topics Concern    Not on file   Social History Narrative    Lives with adult child; daughterOmi       Family History   Problem Relation Age of Onset    Tuberculosis Mother     Heart attack Father     Hypertension Son     Hyperlipidemia Son     Hypertension Brother     Colon cancer Brother      Past Surgical History:   Procedure Laterality Date    APPENDECTOMY      CATARACT EXTRACTION      CHOLECYSTECTOMY         Current Outpatient Medications:     acetaminophen (TYLENOL) 325 mg tablet, every 4 (four) hours, Disp: , Rfl:     brimonidine tartrate 0 2 % ophthalmic solution, brimonidine 0 2 % eye drops  INSTILL 1 DROP INTO AFFECTED EYE(S) BY OPHTHALMIC ROUTE DAILY, Disp: , Rfl:     Multiple Vitamins-Minerals (MULTIVITAMIN ADULT PO), multivitamin  1 po daily, Disp: , Rfl:     bisacodyl (DULCOLAX) 10 mg suppository, Daily, Disp: , Rfl:     magnesium hydroxide (MILK OF MAGNESIA) 400 mg/5 mL oral suspension, 30 mL Daily, Disp: , Rfl:     melatonin 3 mg, Daily, Disp: , Rfl:     sodium phosphate-biphosphate (FLEET) 7-19 g 118 mL enema, Fleet Enema  1 pr daily prn costipation, Disp: , Rfl:   Allergies   Allergen Reactions    Acetazolamide     Codeine      Reaction Date: 29Apr2011;     Pollen Extract Other (See Comments)     Vitals:    02/11/20 1116   BP: 128/60   BP Location: Right arm   Patient Position: Sitting   Cuff Size: Adult   Pulse: 65   SpO2: 96%   Weight: 57 6 kg (127 lb)   Height: 5' (1 524 m)       Labs:  No visits with results within 2 Month(s) from this visit     Latest known visit with results is:   Admission on 08/19/2018, Discharged on 08/22/2018   Component Date Value    WBC 08/19/2018 7 40     RBC 08/19/2018 4 39     Hemoglobin 08/19/2018 13 1     Hematocrit 08/19/2018 39 9     MCV 08/19/2018 91     MCH 08/19/2018 29 8     MCHC 08/19/2018 32 8     RDW 08/19/2018 14 3     MPV 08/19/2018 12 2     Platelets 91/70/1234 174     nRBC 08/19/2018 0     Neutrophils Relative 08/19/2018 52     Immat GRANS % 08/19/2018 0     Lymphocytes Relative 08/19/2018 37     Monocytes Relative 08/19/2018 10     Eosinophils Relative 08/19/2018 1     Basophils Relative 08/19/2018 0     Neutrophils Absolute 08/19/2018 3 77     Immature Grans Absolute 08/19/2018 0 02     Lymphocytes Absolute 08/19/2018 2 74     Monocytes Absolute 08/19/2018 0 74     Eosinophils Absolute 08/19/2018 0 10  Basophils Absolute 08/19/2018 0 03     Sodium 08/19/2018 139     Potassium 08/19/2018 3 3*    Chloride 08/19/2018 99*    CO2 08/19/2018 31     ANION GAP 08/19/2018 9     BUN 08/19/2018 15     Creatinine 08/19/2018 0 86     Glucose 08/19/2018 121     Calcium 08/19/2018 9 6     eGFR 08/19/2018 57     Troponin I 08/19/2018 <0 02     Total Bilirubin 08/19/2018 0 40     Bilirubin, Direct 08/19/2018 0 11     Alkaline Phosphatase 08/19/2018 103     AST 08/19/2018 22     ALT 08/19/2018 22     Total Protein 08/19/2018 7 4     Albumin 08/19/2018 3 3*    NT-proBNP 08/19/2018 721*    Troponin I 08/19/2018 0 02     Ventricular Rate 08/19/2018 65     Atrial Rate 08/19/2018 65     SD Interval 08/19/2018 164     QRSD Interval 08/19/2018 98     QT Interval 08/19/2018 346     QTC Interval 08/19/2018 359     P Axis 08/19/2018 20     QRS Axis 08/19/2018 42     T Wave Axis 08/19/2018 33     Ventricular Rate 08/19/2018 63     Atrial Rate 08/19/2018 63     SD Interval 08/19/2018 148     QRSD Interval 08/19/2018 126     QT Interval 08/19/2018 438     QTC Interval 08/19/2018 448     P Axis 08/19/2018 27     QRS Axis 08/19/2018 46     T Wave Axis 08/19/2018 21     Ventricular Rate 08/19/2018 69     Atrial Rate 08/19/2018 69     SD Interval 08/19/2018 156     QRSD Interval 08/19/2018 102     QT Interval 08/19/2018 412     QTC Interval 08/19/2018 441     P Axis 08/19/2018 24     QRS Axis 08/19/2018 36     T Wave Axis 08/19/2018 -18     Cholesterol 08/20/2018 275*    Triglycerides 08/20/2018 98     HDL, Direct 08/20/2018 59     LDL Calculated 08/20/2018 196*    Hemoglobin A1C 08/20/2018 6 0     EAG 08/20/2018 126     Sodium 08/20/2018 140     Potassium 08/20/2018 3 5     Chloride 08/20/2018 103     CO2 08/20/2018 31     ANION GAP 08/20/2018 6     BUN 08/20/2018 13     Creatinine 08/20/2018 0 84     Glucose 08/20/2018 102     Calcium 08/20/2018 9 3     eGFR 08/20/2018 58     WBC 08/20/2018 7 56     RBC 08/20/2018 4 26     Hemoglobin 08/20/2018 12 8     Hematocrit 08/20/2018 38 8     MCV 08/20/2018 91     MCH 08/20/2018 30 0     MCHC 08/20/2018 33 0     RDW 08/20/2018 14 3     Platelets 47/56/7813 154     MPV 08/20/2018 12 1     Color, UA 08/20/2018 Yellow     Clarity, UA 08/20/2018 Clear     Specific Gravity, UA 08/20/2018 1 010     pH, UA 08/20/2018 7 0     Leukocytes, UA 08/20/2018 Trace*    Nitrite, UA 08/20/2018 Negative     Protein, UA 08/20/2018 Negative     Glucose, UA 08/20/2018 Negative     Ketones, UA 08/20/2018 Negative     Urobilinogen, UA 08/20/2018 0 2     Bilirubin, UA 08/20/2018 Negative     Blood, UA 08/20/2018 Negative     RBC, UA 08/20/2018 None Seen     WBC, UA 08/20/2018 0-1*    Epithelial Cells 08/20/2018 None Seen     Bacteria, UA 08/20/2018 None Seen     Ventricular Rate 08/21/2018 64     Atrial Rate 08/21/2018 64     WA Interval 08/21/2018 150     QRSD Interval 08/21/2018 100     QT Interval 08/21/2018 386     QTC Interval 08/21/2018 398     P Axis 08/21/2018 57     QRS [place] 08/21/2018 -17     T Wave Axis 08/21/2018 116     Ventricular Rate 08/19/2018 65     Atrial Rate 08/19/2018 61     QRSD Interval 08/19/2018 102     QT Interval 08/19/2018 412     QTC Interval 08/19/2018 428     QRS Axis 08/19/2018 51     T Wave Axis 08/19/2018 -6      Imaging: No results found  Review of Systems:  Review of Systems   Constitutional: Negative for chills and fever  HENT: Negative for ear discharge, sinus pain and sore throat  Eyes: Negative for pain and redness  Respiratory: Positive for shortness of breath  Negative for cough, chest tightness and wheezing  Cardiovascular: Positive for leg swelling  Negative for chest pain and palpitations  Gastrointestinal: Negative for abdominal distention, abdominal pain, diarrhea, nausea and vomiting  Endocrine: Negative for cold intolerance and heat intolerance     Genitourinary: Negative for difficulty urinating and hematuria  Musculoskeletal: Negative for joint swelling, myalgias, neck pain and neck stiffness  Skin: Negative for rash and wound  Neurological: Negative for syncope and headaches  Hematological: Does not bruise/bleed easily  Psychiatric/Behavioral: Negative for agitation and behavioral problems  The patient is not nervous/anxious  /60 (BP Location: Right arm, Patient Position: Sitting, Cuff Size: Adult)   Pulse 65   Ht 5' (1 524 m)   Wt 57 6 kg (127 lb)   SpO2 96%   BMI 24 80 kg/m²     Physical Exam:  Physical Exam   Constitutional: She appears well-developed and well-nourished  No distress  HENT:   Head: Normocephalic and atraumatic  Mouth/Throat: No oropharyngeal exudate  Eyes: Conjunctivae and EOM are normal  Scleral icterus is present  Neck: Normal range of motion  No thyromegaly present  Cardiovascular: Normal rate, regular rhythm and intact distal pulses  Exam reveals no gallop and no friction rub  Murmur heard  Pulmonary/Chest: Effort normal and breath sounds normal  No respiratory distress  She has no wheezes  She has no rales  Abdominal: Soft  Bowel sounds are normal  She exhibits no distension  Musculoskeletal: Normal range of motion  She exhibits edema  Neurological: She is alert  She has normal reflexes  Skin: Skin is warm and dry  No rash noted  She is not diaphoretic  Psychiatric: She has a normal mood and affect  Her behavior is normal        Discussion/Summary:  1  BL LE edema  Continue furosemide and potassium as needed  Keep legs elevated at night  Low-salt diet  Use  venous compression stockings during daytimr    3  HTN, hypertensive heart disease, diastolic dysfunction:  BP stable without need for anti-HTN meds    4  HLD:  Continue diet control  Statin avoided at this age    11   AR/MR/AS/TR:   ECHO Aug 2018 shows EF 60%, no RWMA, estimated peak pressure at least 30 mmHg, mild MR, mild AS, mild AR, mild TR, trace DE  Recommend aggressive risk factor modification and therapeutic lifestyle changes  Low salt, low calorie, low fat, low cholesterol diet  Discussed concepts of atherosclerosis, including signs and symptoms of cardiac disease  Previous studies were reviewed  Safety measures were reviewed  Questions were entertained and answered  Patient was advised to report any problem requiring medical attention  Follow up with appropriate specialists and lab work as discussed  Return for follow up visit as scheduled or earlier, if needed  Thank you for allowing me to participate in the care and evaluation of your patient  Should you have any questions, please feel free to contact me

## 2020-02-19 ENCOUNTER — TELEPHONE (OUTPATIENT)
Dept: FAMILY MEDICINE CLINIC | Facility: CLINIC | Age: 85
End: 2020-02-19

## 2020-02-19 DIAGNOSIS — R60.0 BILATERAL LEG EDEMA: ICD-10-CM

## 2020-02-19 DIAGNOSIS — I63.512 CEREBROVASCULAR ACCIDENT (CVA) DUE TO OCCLUSION OF LEFT MIDDLE CEREBRAL ARTERY (HCC): ICD-10-CM

## 2020-02-19 DIAGNOSIS — R26.81 GAIT INSTABILITY: Primary | ICD-10-CM

## 2020-02-19 DIAGNOSIS — R29.6 FREQUENT FALLS: ICD-10-CM

## 2020-02-19 DIAGNOSIS — R53.1 WEAKNESS OF LEFT SIDE OF BODY: ICD-10-CM

## 2020-02-19 DIAGNOSIS — R06.02 SHORTNESS OF BREATH ON EXERTION: ICD-10-CM

## 2020-02-19 NOTE — TELEPHONE ENCOUNTER
Home care is insurance covered  There is nothing I can do to get insurance to cover a longer period of time     However, if family wishes to pay out of pocket, they can certainly do that and I can write the order to continue PT

## 2020-02-19 NOTE — TELEPHONE ENCOUNTER
Shailesh Pryor called in and wanted to now if you might be able to give her a call  They are concerned about the home care as it is coming to an end and the family believes she needs to have it longer b/c it is hard to get her out      They can be reached at [phone]

## 2020-02-20 ENCOUNTER — TELEPHONE (OUTPATIENT)
Dept: FAMILY MEDICINE CLINIC | Facility: CLINIC | Age: 85
End: 2020-02-20

## 2020-02-20 NOTE — TELEPHONE ENCOUNTER
Can we find out who was providing physical therapy  We already advised family that if insurance has said she has exceeded for 2020, then there is nothing more we can do, however, they have requested we still try and put another referral in for PT /OT for balance issues     Please place order with her current providing agency and I will approve

## 2020-02-21 NOTE — TELEPHONE ENCOUNTER
Family notified of the message    Who do they get to do they home visit   What is the next step  She was told by them that they just needed a referral placed

## 2020-02-21 NOTE — TELEPHONE ENCOUNTER
Please notify the family of the below information   My hands are tied, I cannot send another referral until they conduct a home visit br

## 2020-02-21 NOTE — TELEPHONE ENCOUNTER
[facility] called and said that they discharged Monique Tamayo from their services on 2/14/2020  She also stated that Monique Tamayo will require another face face prior to another order being sent

## 2020-02-21 NOTE — TELEPHONE ENCOUNTER
I received a call from someone else at [facility], Jose Manuel Cole  She is faxing us a form to complete and would like the note from 1/20/2020 faxed along with this form once completed and they should be able to restart her PT based off of this  She is faxing this now  Once received I will place the paperwork in your folder

## 2020-02-25 NOTE — TELEPHONE ENCOUNTER
Sheldon Kellogg called back    I had the form emailed to me and have to compete  Once competed, we need ov notes from 1/20/20 faxed back  If we need to contact her   Phone # is [phone]

## 2020-02-27 NOTE — TELEPHONE ENCOUNTER
Talked to [facility]   The last office visit date of 1/20/20 will not work for the new order    She does need a new face to face to reinstate the home health order       I scheduled her 12:20 on Monday

## 2020-02-27 NOTE — TELEPHONE ENCOUNTER
I think Angel Goins now has the form  The company sent a letter stating that I should not place referral until they have evaluated her  They said they will not accept the referral until they re-eval Lyndsay Mahoney and they will then contact us if they will agree to provide services

## 2020-02-27 NOTE — TELEPHONE ENCOUNTER
Patient's daughter left a message wanted to schedule an appt for Margie Tinsley with you so they can reinstate home health services  I wasn't sure where we stood with the form from [employer] that was placed in your in basket on your desk  Please advise if we should go forward with an appt or not

## 2020-02-29 NOTE — PROGRESS NOTES
Assessment/Plan:       Diagnoses and all orders for this visit:    Arthritis    Bilateral leg edema    Cerebrovascular accident (CVA) due to occlusion of left middle cerebral artery (HCC)    Frequent falls    Numbness of left hand    Gait instability    Shortness of breath on exertion    Diastolic dysfunction        No problem-specific Assessment & Plan notes found for this encounter  Subjective:      Patient ID: Nohelia Holman is a 80 y o  female  Patient is here for re-evalutaion for continued physical therapy at home  She is here with her daughter  She recently had physical therapy in her home  Her last fall was in January 2020  She developed a wound  Nursing came to the house to treat this  She will need physical therapy and home health   She uses a cane and she uses a walker  She walks in the home from the bathroom to the living room  Her bedroom is upstairs  She ambulates up the stairs at bedtime and comes down in the morning and stays on the first floor during the day  Patient has chronic leg swelling  Some forgetfulness  Daughter noticed an improvement with prior physical therapy  She has a shower chair  Handicap placard paperwork was also completed today  Walked into office today using a cane  Did not use a wheelchair  Was very short of breath upon approaching the exam room  The following portions of the patient's history were reviewed and updated as appropriate:   She has a past medical history of Aortic stenosis, Arthritis, Asthma, Chest pain, Chronic lower back pain, COPD (chronic obstructive pulmonary disease) ([address] ), Esophageal reflux, Hyperlipidemia, Hypertension, and Varicose veins of leg with edema, unspecified laterality  ,  does not have any pertinent problems on file  ,   has a past surgical history that includes Appendectomy; Cholecystectomy; and Cataract extraction  ,  family history includes Colon cancer in her brother; Heart attack in her father; Hyperlipidemia in her son; Hypertension in her brother and son; Tuberculosis in her mother  ,   reports that she has never smoked  She has never used smokeless tobacco  She reports that she does not drink alcohol or use drugs  ,  is allergic to acetazolamide; codeine; and pollen extract     Current Outpatient Medications   Medication Sig Dispense Refill    Multiple Vitamins-Minerals (MULTIVITAMIN ADULT PO) multivitamin   1 po daily      acetaminophen (TYLENOL) 325 mg tablet every 4 (four) hours      bisacodyl (DULCOLAX) 10 mg suppository Daily      brimonidine tartrate 0 2 % ophthalmic solution brimonidine 0 2 % eye drops   INSTILL 1 DROP INTO AFFECTED EYE(S) BY OPHTHALMIC ROUTE DAILY      magnesium hydroxide (MILK OF MAGNESIA) 400 mg/5 mL oral suspension 30 mL Daily      melatonin 3 mg Daily      sodium phosphate-biphosphate (FLEET) 7-19 g 118 mL enema Fleet Enema   1 pr daily prn costipation       No current facility-administered medications for this visit  Review of Systems   Constitutional: Negative  Negative for fatigue and fever  HENT: Negative  Negative for congestion  Eyes: Negative  Negative for visual disturbance  Respiratory: Negative for cough, chest tightness, shortness of breath and wheezing  Cardiovascular: Positive for leg swelling  Negative for chest pain and palpitations  Gastrointestinal: Negative  Negative for abdominal pain, blood in stool, diarrhea and nausea  Endocrine: Negative for polydipsia, polyphagia and polyuria  Genitourinary: Negative for difficulty urinating and flank pain  Musculoskeletal: Positive for gait problem  Negative for arthralgias, back pain and myalgias  Skin: Negative  Negative for color change, pallor and rash  Allergic/Immunologic: Negative for immunocompromised state  Neurological: Positive for dizziness, weakness and light-headedness  Negative for numbness and headaches  Hematological: Negative for adenopathy     Psychiatric/Behavioral: Positive for confusion  Negative for decreased concentration and sleep disturbance  All other systems reviewed and are negative  Objective:  Vitals:    03/02/20 1223   BP: 134/68   BP Location: Left arm   Patient Position: Sitting   Pulse: 70   Resp: 18   Temp: 99 7 °F (37 6 °C)   SpO2: 96%   Weight: 57 2 kg (126 lb)   Height: 5' (1 524 m)     Body mass index is 24 61 kg/m²  Physical Exam   Constitutional: She is oriented to person, place, and time  She appears well-developed and well-nourished  No distress  HENT:   Head: Normocephalic and atraumatic  Right Ear: External ear normal    Left Ear: External ear normal    Nose: Nose normal    Mouth/Throat: Oropharynx is clear and moist  No oropharyngeal exudate  Eyes: Pupils are equal, round, and reactive to light  Conjunctivae are normal  No scleral icterus  Neck: Normal range of motion  Neck supple  No JVD present  No thyromegaly present  Cardiovascular: Normal rate, regular rhythm and intact distal pulses  Exam reveals no gallop and no friction rub  Murmur heard  Pulmonary/Chest: Breath sounds normal  No respiratory distress  She exhibits no tenderness  shortness of breath with exertion   Abdominal: Soft  Bowel sounds are normal  She exhibits no distension  There is no tenderness  Musculoskeletal: She exhibits edema  She exhibits no tenderness or deformity  Lymphadenopathy:     She has no cervical adenopathy  Neurological: She is alert and oriented to person, place, and time  Coordination normal    Skin: Skin is warm and dry  No rash noted  She is not diaphoretic  Psychiatric: She has a normal mood and affect  Her behavior is normal  Judgment and thought content normal    Nursing note and vitals reviewed

## 2020-03-02 ENCOUNTER — OFFICE VISIT (OUTPATIENT)
Dept: FAMILY MEDICINE CLINIC | Facility: CLINIC | Age: 85
End: 2020-03-02
Payer: MEDICARE

## 2020-03-02 VITALS
OXYGEN SATURATION: 96 % | TEMPERATURE: 99.7 F | BODY MASS INDEX: 24.74 KG/M2 | DIASTOLIC BLOOD PRESSURE: 68 MMHG | HEART RATE: 70 BPM | HEIGHT: 60 IN | RESPIRATION RATE: 18 BRPM | SYSTOLIC BLOOD PRESSURE: 134 MMHG | WEIGHT: 126 LBS

## 2020-03-02 DIAGNOSIS — M19.90 ARTHRITIS: Primary | ICD-10-CM

## 2020-03-02 DIAGNOSIS — R29.6 FREQUENT FALLS: ICD-10-CM

## 2020-03-02 DIAGNOSIS — R06.02 SHORTNESS OF BREATH ON EXERTION: ICD-10-CM

## 2020-03-02 DIAGNOSIS — R26.81 GAIT INSTABILITY: ICD-10-CM

## 2020-03-02 DIAGNOSIS — R60.0 BILATERAL LEG EDEMA: ICD-10-CM

## 2020-03-02 DIAGNOSIS — R20.0 NUMBNESS OF LEFT HAND: ICD-10-CM

## 2020-03-02 DIAGNOSIS — I51.89 DIASTOLIC DYSFUNCTION: ICD-10-CM

## 2020-03-02 DIAGNOSIS — I63.512 CEREBROVASCULAR ACCIDENT (CVA) DUE TO OCCLUSION OF LEFT MIDDLE CEREBRAL ARTERY (HCC): ICD-10-CM

## 2020-03-02 PROCEDURE — 3075F SYST BP GE 130 - 139MM HG: CPT | Performed by: NURSE PRACTITIONER

## 2020-03-02 PROCEDURE — 3008F BODY MASS INDEX DOCD: CPT | Performed by: NURSE PRACTITIONER

## 2020-03-02 PROCEDURE — 3078F DIAST BP <80 MM HG: CPT | Performed by: NURSE PRACTITIONER

## 2020-03-02 PROCEDURE — 99214 OFFICE O/P EST MOD 30 MIN: CPT | Performed by: NURSE PRACTITIONER

## 2020-03-02 PROCEDURE — 1160F RVW MEDS BY RX/DR IN RCRD: CPT | Performed by: NURSE PRACTITIONER

## 2020-03-02 PROCEDURE — 1036F TOBACCO NON-USER: CPT | Performed by: NURSE PRACTITIONER

## 2020-03-02 NOTE — PATIENT INSTRUCTIONS
Re-Eval for physical therapy at home  Patient with h/o CVA and left sides weakness and numbness  She completed physical therapy and family found this to be beneficial    She is short of breath with exertion  Gait is unstable and shuffling  She is using a cane in the office today  I would recommend continued physical therapy to prevent deconditioning    She will require assistance with ADLS, showering, meal prep etc

## 2020-04-06 ENCOUNTER — TELEPHONE (OUTPATIENT)
Dept: FAMILY MEDICINE CLINIC | Facility: CLINIC | Age: 85
End: 2020-04-06

## 2020-04-06 DIAGNOSIS — R21 RASH: Primary | ICD-10-CM

## 2020-04-06 RX ORDER — NYSTATIN 100000 U/G
OINTMENT TOPICAL 2 TIMES DAILY
Qty: 30 G | Refills: 0 | Status: SHIPPED | OUTPATIENT
Start: 2020-04-06 | End: 2021-06-30

## 2020-06-13 ENCOUNTER — NURSE TRIAGE (OUTPATIENT)
Dept: OTHER | Facility: OTHER | Age: 85
End: 2020-06-13

## 2020-06-15 DIAGNOSIS — R53.81 PHYSICAL DECONDITIONING: Primary | ICD-10-CM

## 2020-06-16 DIAGNOSIS — R07.81 RIB PAIN ON LEFT SIDE: Primary | ICD-10-CM
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
## 2020-06-18 ENCOUNTER — TELEPHONE (OUTPATIENT)
Dept: FAMILY MEDICINE CLINIC | Facility: CLINIC | Age: 85
End: 2020-06-18

## 2020-06-19 ENCOUNTER — TELEPHONE (OUTPATIENT)
Dept: FAMILY MEDICINE CLINIC | Facility: CLINIC | Age: 85
End: 2020-06-19

## 2020-06-19 NOTE — TELEPHONE ENCOUNTER
Praful from the [facility] needs to talk to someone about the report that was sent in yesterday by cecy

## 2020-06-21 ENCOUNTER — APPOINTMENT (EMERGENCY)
Dept: RADIOLOGY | Facility: HOSPITAL | Age: 85
End: 2020-06-21
Payer: MEDICARE

## 2020-06-21 ENCOUNTER — APPOINTMENT (EMERGENCY)
Dept: CT IMAGING | Facility: HOSPITAL | Age: 85
End: 2020-06-21
Payer: MEDICARE

## 2020-06-21 ENCOUNTER — HOSPITAL ENCOUNTER (EMERGENCY)
Facility: HOSPITAL | Age: 85
Discharge: HOME | End: 2020-06-21
Attending: EMERGENCY MEDICINE | Admitting: EMERGENCY MEDICINE
Payer: MEDICARE

## 2020-06-21 VITALS
WEIGHT: 127.87 LBS | RESPIRATION RATE: 16 BRPM | DIASTOLIC BLOOD PRESSURE: 64 MMHG | TEMPERATURE: 98.1 F | SYSTOLIC BLOOD PRESSURE: 123 MMHG | OXYGEN SATURATION: 95 % | BODY MASS INDEX: 24.97 KG/M2 | HEART RATE: 57 BPM

## 2020-06-21 DIAGNOSIS — S22.32XA CLOSED FRACTURE OF ONE RIB OF LEFT SIDE, INITIAL ENCOUNTER: Primary | ICD-10-CM
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
## 2020-06-21 LAB
ALBUMIN SERPL BCP-MCNC: 3.2 G/DL (ref 3.5–5)
ALP SERPL-CCNC: 103 U/L (ref 46–116)
ALT SERPL W P-5'-P-CCNC: 19 U/L (ref 12–78)
ANION GAP SERPL CALCULATED.3IONS-SCNC: 6 MMOL/L (ref 4–13)
AST SERPL W P-5'-P-CCNC: 25 U/L (ref 5–45)
ATRIAL RATE: 63 BPM
BACTERIA UR QL AUTO: ABNORMAL /HPF
BASOPHILS # BLD AUTO: 0.02 THOUSANDS/ΜL (ref 0–0.1)
BASOPHILS NFR BLD AUTO: 0 % (ref 0–1)
BILIRUB SERPL-MCNC: 0.4 MG/DL (ref 0.2–1)
BILIRUB UR QL STRIP: NEGATIVE
BUN SERPL-MCNC: 14 MG/DL (ref 5–25)
CALCIUM SERPL-MCNC: 9.1 MG/DL (ref 8.3–10.1)
CHLORIDE SERPL-SCNC: 103 MMOL/L (ref 100–108)
CLARITY UR: CLEAR
CO2 SERPL-SCNC: 29 MMOL/L (ref 21–32)
COLOR UR: YELLOW
CREAT SERPL-MCNC: 0.86 MG/DL (ref 0.6–1.3)
EOSINOPHIL # BLD AUTO: 0.07 THOUSAND/ΜL (ref 0–0.61)
EOSINOPHIL NFR BLD AUTO: 1 % (ref 0–6)
ERYTHROCYTE [DISTWIDTH] IN BLOOD BY AUTOMATED COUNT: 14.1 % (ref 11.6–15.1)
GFR SERPL CREATININE-BSD FRML MDRD: 56 ML/MIN/1.73SQ M
GLUCOSE SERPL-MCNC: 104 MG/DL (ref 65–140)
GLUCOSE UR STRIP-MCNC: NEGATIVE MG/DL
HCT VFR BLD AUTO: 37.7 % (ref 34.8–46.1)
HGB BLD-MCNC: 12.2 G/DL (ref 11.5–15.4)
HGB UR QL STRIP.AUTO: NEGATIVE
IMM GRANULOCYTES # BLD AUTO: 0.01 THOUSAND/UL (ref 0–0.2)
IMM GRANULOCYTES NFR BLD AUTO: 0 % (ref 0–2)
KETONES UR STRIP-MCNC: NEGATIVE MG/DL
LEUKOCYTE ESTERASE UR QL STRIP: ABNORMAL
LYMPHOCYTES # BLD AUTO: 1.72 THOUSANDS/ΜL (ref 0.6–4.47)
LYMPHOCYTES NFR BLD AUTO: 25 % (ref 14–44)
MCH RBC QN AUTO: 29.9 PG (ref 26.8–34.3)
MCHC RBC AUTO-ENTMCNC: 32.4 G/DL (ref 31.4–37.4)
MCV RBC AUTO: 92 FL (ref 82–98)
MONOCYTES # BLD AUTO: 0.55 THOUSAND/ΜL (ref 0.17–1.22)
MONOCYTES NFR BLD AUTO: 8 % (ref 4–12)
NEUTROPHILS # BLD AUTO: 4.45 THOUSANDS/ΜL (ref 1.85–7.62)
NEUTS SEG NFR BLD AUTO: 66 % (ref 43–75)
NITRITE UR QL STRIP: NEGATIVE
NON-SQ EPI CELLS URNS QL MICRO: ABNORMAL /HPF
NRBC BLD AUTO-RTO: 0 /100 WBCS
P AXIS: 43 DEGREES
PH UR STRIP.AUTO: 7 [PH]
PLATELET # BLD AUTO: 173 THOUSANDS/UL (ref 149–390)
PMV BLD AUTO: 12.2 FL (ref 8.9–12.7)
POTASSIUM SERPL-SCNC: 3.7 MMOL/L (ref 3.5–5.3)
PR INTERVAL: 130 MS
PROT SERPL-MCNC: 7.1 G/DL (ref 6.4–8.2)
PROT UR STRIP-MCNC: NEGATIVE MG/DL
QRS AXIS: -5 DEGREES
QRSD INTERVAL: 96 MS
QT INTERVAL: 412 MS
QTC INTERVAL: 421 MS
RBC # BLD AUTO: 4.08 MILLION/UL (ref 3.81–5.12)
RBC #/AREA URNS AUTO: ABNORMAL /HPF
SODIUM SERPL-SCNC: 138 MMOL/L (ref 136–145)
SP GR UR STRIP.AUTO: <=1.005 (ref 1–1.03)
T WAVE AXIS: 68 DEGREES
TROPONIN I SERPL-MCNC: <0.02 NG/ML
UROBILINOGEN UR QL STRIP.AUTO: 0.2 E.U./DL
VENTRICULAR RATE: 63 BPM
WBC # BLD AUTO: 6.82 THOUSAND/UL (ref 4.31–10.16)
WBC #/AREA URNS AUTO: ABNORMAL /HPF

## 2020-06-21 PROCEDURE — 73060 X-RAY EXAM OF HUMERUS: CPT

## 2020-06-21 PROCEDURE — 93010 ELECTROCARDIOGRAM REPORT: CPT | Performed by: INTERNAL MEDICINE

## 2020-06-21 PROCEDURE — 81001 URINALYSIS AUTO W/SCOPE: CPT | Performed by: EMERGENCY MEDICINE

## 2020-06-21 PROCEDURE — 85025 COMPLETE CBC W/AUTO DIFF WBC: CPT | Performed by: EMERGENCY MEDICINE

## 2020-06-21 PROCEDURE — 84484 ASSAY OF TROPONIN QUANT: CPT | Performed by: EMERGENCY MEDICINE

## 2020-06-21 PROCEDURE — 71045 X-RAY EXAM CHEST 1 VIEW: CPT

## 2020-06-21 PROCEDURE — 80053 COMPREHEN METABOLIC PANEL: CPT | Performed by: EMERGENCY MEDICINE

## 2020-06-21 PROCEDURE — 99284 EMERGENCY DEPT VISIT MOD MDM: CPT

## 2020-06-21 PROCEDURE — 71250 CT THORAX DX C-: CPT

## 2020-06-21 PROCEDURE — 93005 ELECTROCARDIOGRAM TRACING: CPT

## 2020-06-21 PROCEDURE — 36415 COLL VENOUS BLD VENIPUNCTURE: CPT | Performed by: EMERGENCY MEDICINE

## 2020-06-21 PROCEDURE — 99285 EMERGENCY DEPT VISIT HI MDM: CPT | Performed by: EMERGENCY MEDICINE

## 2020-06-21 RX ORDER — ACETAMINOPHEN 325 MG/1
650 TABLET ORAL EVERY 6 HOURS PRN
Qty: 40 TABLET | Refills: 0 | Status: SHIPPED | OUTPATIENT
Start: 2020-06-21

## 2020-06-21 RX ORDER — LIDOCAINE 50 MG/G
1 PATCH TOPICAL DAILY
Qty: 30 PATCH | Refills: 0 | Status: SHIPPED | OUTPATIENT
Start: 2020-06-21

## 2020-06-21 RX ORDER — LIDOCAINE 50 MG/G
1 PATCH TOPICAL ONCE
Status: DISCONTINUED | OUTPATIENT
Start: 2020-06-21 | End: 2020-06-21 | Stop reason: HOSPADM

## 2020-06-21 RX ADMIN — LIDOCAINE 1 PATCH: 50 PATCH TOPICAL at 15:16

## 2020-06-23 NOTE — TELEPHONE ENCOUNTER
I tried calling the number [phone] and there was no answer  Is there another number to reach someone?

## 2020-07-06 ENCOUNTER — TELEPHONE (OUTPATIENT)
Dept: OTHER | Facility: OTHER | Age: 85
End: 2020-07-06

## 2020-07-06 NOTE — TELEPHONE ENCOUNTER
Pts nurse was recerted for more care   Did Fx left wrist last week, but is moving around well and only has mild swelling

## 2020-07-06 NOTE — TELEPHONE ENCOUNTER
Kimmy Murrayf, can you call [facility]   AGAIN  I NEED to talk to them when I get back   This poor woman has been injured too many times this year

## 2020-07-07 ENCOUNTER — PATIENT OUTREACH (OUTPATIENT)
Dept: CASE MANAGEMENT | Facility: OTHER | Age: 85
End: 2020-07-07

## 2020-07-07 DIAGNOSIS — Z78.9 NEED FOR FOLLOW-UP BY SOCIAL WORKER: Primary | ICD-10-CM

## 2020-07-07 NOTE — PROGRESS NOTES
OPCM,  is responding to consult  Patient is 80years old and has a history of falls at home  Patient resides with family and there are concerns about patient's care  Telephone call placed to patient's home and spoke to daughter in law, Anasandra German Pascual German was cooperative and receptive to my call  Samara Porter informed me that patient is residing with her and patient's son in their home  Samara Porter is her primary care giver and reports providing 24 hr care and supervision for patient  Patient ambulates with a walker and she requires assistance with her ADL's  Samara Porter assists patient with her ADL's, prepares meals, transports patient to medical appointments, etc   Patient also receives home health services from [facility]  I provided supportive counseling and information on community resources  Long term planning discussed and family wishes to continue caring for patient at home and are not interested in long term placement  I discussed the Aging Waiver but Samara Porter reports that patient is over the income guidelines  Patient and  spouse do not have any [place]'s status  Samara Porter reports that patient is managing with her current supports in place  Samara Porter asked about transportation services and I discussed the judo  Telephone call placed to [address] [place] [address] and message left for [facility] requesting that an application for seniors be mailed to patient's home  Telephone call placed to [facility] and spoke to Pacheco Guerrero [facility] as patient's nurse was unavailable  Cesar informed me that patient has been consistently receiving OT and home health aid services  The family has refused a few visits but she did not feel that this was a pattern or reason for concern  Cesar took my number and informed me that she will have patient's nurse contact me with any concerns    I also requested that a home health  visit with patient and family to offer additional support  Azam Song agreed and will put in an order for a  to follow patient  Patient's daughter in law, Malka Mosher denies having any other needs at this time  I provided her with my contact information and advised her to contact me if patient/family is in need of any further assistance or support

## 2020-07-15 ENCOUNTER — TELEPHONE (OUTPATIENT)
Dept: FAMILY MEDICINE CLINIC | Facility: CLINIC | Age: 85
End: 2020-07-15

## 2020-07-28 ENCOUNTER — TELEPHONE (OUTPATIENT)
Dept: FAMILY MEDICINE CLINIC | Facility: CLINIC | Age: 85
End: 2020-07-28

## 2020-07-28 DIAGNOSIS — R35.0 URINARY FREQUENCY: Primary | ICD-10-CM

## 2020-07-28 DIAGNOSIS — R35.89 POLYURIA: ICD-10-CM

## 2020-07-28 NOTE — TELEPHONE ENCOUNTER
Beth Roth would like to speak with you regarding her mother in law  RE: she fell broke bone between wrist/forearm using a  has brace, has homecare  Now has frequency with urination  Needs advicce from you       Please call

## 2020-07-28 NOTE — TELEPHONE ENCOUNTER
I spoke with Armen Fitzgerald, patient daughter in law  Since her fall a month ago, There are concerns that patient is urinating 20-25 times a day  Urine is clear yellow  There is no pain  She is not drinking a lot of fluids, per daughter     I discussed with daughter that I would like to obtain urine testing and labwork

## 2020-08-04 ENCOUNTER — APPOINTMENT (OUTPATIENT)
Dept: LAB | Facility: CLINIC | Age: 85
End: 2020-08-04
Payer: MEDICARE

## 2020-08-04 ENCOUNTER — LAB (OUTPATIENT)
Dept: LAB | Facility: CLINIC | Age: 85
End: 2020-08-04
Payer: MEDICARE

## 2020-08-04 DIAGNOSIS — M19.90 ARTHRITIS: ICD-10-CM

## 2020-08-04 DIAGNOSIS — R35.0 URINARY FREQUENCY: ICD-10-CM

## 2020-08-04 DIAGNOSIS — T50.2X5A DIURETIC-INDUCED HYPOKALEMIA: ICD-10-CM

## 2020-08-04 DIAGNOSIS — I63.512 CEREBROVASCULAR ACCIDENT (CVA) DUE TO OCCLUSION OF LEFT MIDDLE CEREBRAL ARTERY (HCC): ICD-10-CM

## 2020-08-04 DIAGNOSIS — E78.00 HYPERCHOLESTEROLEMIA: ICD-10-CM

## 2020-08-04 DIAGNOSIS — I11.9 HYPERTENSIVE HEART DISEASE WITHOUT HEART FAILURE: ICD-10-CM

## 2020-08-04 DIAGNOSIS — R35.89 POLYURIA: ICD-10-CM

## 2020-08-04 DIAGNOSIS — E78.2 MIXED HYPERLIPIDEMIA: ICD-10-CM

## 2020-08-04 DIAGNOSIS — Z79.899 LONG TERM CURRENT USE OF DIURETIC: ICD-10-CM

## 2020-08-04 DIAGNOSIS — R29.6 FREQUENT FALLS: ICD-10-CM

## 2020-08-04 DIAGNOSIS — R60.0 BILATERAL LEG EDEMA: ICD-10-CM

## 2020-08-04 DIAGNOSIS — Z86.73 HISTORY OF TIA (TRANSIENT ISCHEMIC ATTACK): ICD-10-CM

## 2020-08-04 DIAGNOSIS — I51.89 DIASTOLIC DYSFUNCTION: ICD-10-CM

## 2020-08-04 DIAGNOSIS — E87.6 DIURETIC-INDUCED HYPOKALEMIA: ICD-10-CM

## 2020-08-04 LAB
ALBUMIN SERPL BCP-MCNC: 3.4 G/DL (ref 3.5–5)
ALP SERPL-CCNC: 136 U/L (ref 46–116)
ALT SERPL W P-5'-P-CCNC: 17 U/L (ref 12–78)
ANION GAP SERPL CALCULATED.3IONS-SCNC: 7 MMOL/L (ref 4–13)
AST SERPL W P-5'-P-CCNC: 18 U/L (ref 5–45)
BILIRUB SERPL-MCNC: 0.51 MG/DL (ref 0.2–1)
BUN SERPL-MCNC: 11 MG/DL (ref 5–25)
CALCIUM SERPL-MCNC: 9.6 MG/DL (ref 8.3–10.1)
CHLORIDE SERPL-SCNC: 102 MMOL/L (ref 100–108)
CO2 SERPL-SCNC: 28 MMOL/L (ref 21–32)
CREAT SERPL-MCNC: 0.63 MG/DL (ref 0.6–1.3)
GFR SERPL CREATININE-BSD FRML MDRD: 74 ML/MIN/1.73SQ M
GLUCOSE SERPL-MCNC: 90 MG/DL (ref 65–140)
POTASSIUM SERPL-SCNC: 4.2 MMOL/L (ref 3.5–5.3)
PROT SERPL-MCNC: 7 G/DL (ref 6.4–8.2)
SODIUM 24H UR-SCNC: 54 MOL/L
SODIUM SERPL-SCNC: 137 MMOL/L (ref 136–145)

## 2020-08-04 PROCEDURE — 84300 ASSAY OF URINE SODIUM: CPT | Performed by: NURSE PRACTITIONER

## 2020-08-04 PROCEDURE — 80053 COMPREHEN METABOLIC PANEL: CPT

## 2020-08-04 PROCEDURE — 87086 URINE CULTURE/COLONY COUNT: CPT

## 2020-08-04 PROCEDURE — 36415 COLL VENOUS BLD VENIPUNCTURE: CPT

## 2020-08-05 DIAGNOSIS — N30.00 ACUTE CYSTITIS WITHOUT HEMATURIA: Primary | ICD-10-CM

## 2020-08-05 LAB — BACTERIA UR CULT: NORMAL

## 2020-08-05 RX ORDER — NITROFURANTOIN 25; 75 MG/1; MG/1
100 CAPSULE ORAL 2 TIMES DAILY
Qty: 10 CAPSULE | Refills: 0 | Status: SHIPPED | OUTPATIENT
Start: 2020-08-05 | End: 2020-08-10

## 2020-09-02 ENCOUNTER — TELEPHONE (OUTPATIENT)
Dept: FAMILY MEDICINE CLINIC | Facility: CLINIC | Age: 85
End: 2020-09-02

## 2020-09-02 NOTE — TELEPHONE ENCOUNTER
Unique Jones a physical therapist from [facility] called and patient is doing well  She said she was discharged from Ortho yesterday for her left forearm and wrist   The family at this time feel she is doing well and don't need at home physical therapy services       They are faxing a form over to be sign for discharge from [facility]

## 2021-04-13 ENCOUNTER — IMMUNIZATIONS (OUTPATIENT)
Dept: FAMILY MEDICINE CLINIC | Facility: HOSPITAL | Age: 86
End: 2021-04-13

## 2021-04-13 DIAGNOSIS — Z23 ENCOUNTER FOR IMMUNIZATION: Primary | ICD-10-CM

## 2021-04-13 PROCEDURE — 91300 SARS-COV-2 / COVID-19 MRNA VACCINE (PFIZER-BIONTECH) 30 MCG: CPT | Performed by: INTERNAL MEDICINE

## 2021-04-13 PROCEDURE — 0001A SARS-COV-2 / COVID-19 MRNA VACCINE (PFIZER-BIONTECH) 30 MCG: CPT | Performed by: INTERNAL MEDICINE

## 2021-05-06 ENCOUNTER — IMMUNIZATIONS (OUTPATIENT)
Dept: FAMILY MEDICINE CLINIC | Facility: HOSPITAL | Age: 86
End: 2021-05-06

## 2021-05-06 DIAGNOSIS — Z23 ENCOUNTER FOR IMMUNIZATION: Primary | ICD-10-CM

## 2021-05-06 PROCEDURE — 0002A SARS-COV-2 / COVID-19 MRNA VACCINE (PFIZER-BIONTECH) 30 MCG: CPT

## 2021-05-06 PROCEDURE — 91300 SARS-COV-2 / COVID-19 MRNA VACCINE (PFIZER-BIONTECH) 30 MCG: CPT

## 2021-06-30 ENCOUNTER — TELEMEDICINE (OUTPATIENT)
Dept: CARDIOLOGY CLINIC | Facility: CLINIC | Age: 86
End: 2021-06-30
Payer: MEDICARE

## 2021-06-30 VITALS
SYSTOLIC BLOOD PRESSURE: 117 MMHG | WEIGHT: 124 LBS | HEIGHT: 58 IN | BODY MASS INDEX: 26.03 KG/M2 | DIASTOLIC BLOOD PRESSURE: 75 MMHG

## 2021-06-30 DIAGNOSIS — I35.1 NONRHEUMATIC AORTIC VALVE INSUFFICIENCY: ICD-10-CM

## 2021-06-30 DIAGNOSIS — I35.0 NON-RHEUMATIC AORTIC STENOSIS: ICD-10-CM

## 2021-06-30 DIAGNOSIS — I11.9 HYPERTENSIVE HEART DISEASE WITHOUT HEART FAILURE: ICD-10-CM

## 2021-06-30 DIAGNOSIS — I10 ESSENTIAL HYPERTENSION: ICD-10-CM

## 2021-06-30 DIAGNOSIS — E78.2 MIXED HYPERLIPIDEMIA: ICD-10-CM

## 2021-06-30 DIAGNOSIS — I36.1 NONRHEUMATIC TRICUSPID VALVE REGURGITATION: ICD-10-CM

## 2021-06-30 DIAGNOSIS — R60.0 BILATERAL LEG EDEMA: ICD-10-CM

## 2021-06-30 DIAGNOSIS — I51.89 DIASTOLIC DYSFUNCTION: ICD-10-CM

## 2021-06-30 DIAGNOSIS — I34.0 NONRHEUMATIC MITRAL VALVE REGURGITATION: Primary | ICD-10-CM

## 2021-06-30 PROCEDURE — 99443 PR PHYS/QHP TELEPHONE EVALUATION 21-30 MIN: CPT | Performed by: INTERNAL MEDICINE

## 2021-06-30 NOTE — PROGRESS NOTES
Virtual Brief Visit    Assessment/Plan:    Problem List Items Addressed This Visit        Cardiovascular and Mediastinum    Nonrheumatic mitral valve regurgitation - Primary    Non-rheumatic aortic stenosis    Nonrheumatic tricuspid valve regurgitation                Reason for visit is   Chief Complaint   Patient presents with    Follow-up     1 year follow up        Encounter provider Alia Bartlett MD    Provider located at [address]  [facility] [address] [facility] [place] [address]    Recent Visits  No visits were found meeting these conditions  Showing recent visits within past 7 days and meeting all other requirements  Today's Visits  Date Type Provider Dept   06/30/21 Telemedicine Alia Bartlett MD [facility] [address] today's visits and meeting all other requirements  Future Appointments  No visits were found meeting these conditions  Showing future appointments within next 150 days and meeting all other requirements       After connecting through telephone, the patient was identified by name and date of birth  Jina Balderas was informed that this is a telemedicine visit and that the visit is being conducted through telephone  My office door was closed  No one else was in the room  She acknowledged consent and understanding of privacy and security of the platform  The patient has agreed to participate and understands she can discontinue the visit at any time  Patient is aware this is a billable service  Subjective    Jnia Balderas is a 80 y o  female with known h/o hypertension (currently BP is normal without need of any medication), dyslipidemia (currently off statin due to her age), mitral regurgitation, aortic regurgitation, tricuspid regurgitation  She stays at home with family support  Walks with the help of a cane or walker  Denies chest pain, shortness of breath, palpitation, lightheadedness   Her chronic leg swelling is still present      Past Medical History:   Diagnosis Date    Aortic stenosis     Arthritis     Asthma     Chest pain     Chronic lower back pain     COPD (chronic obstructive pulmonary disease) (HCC)     Esophageal reflux     Hyperlipidemia     Hypertension     Varicose veins of leg with edema, unspecified laterality        Past Surgical History:   Procedure Laterality Date    APPENDECTOMY      CATARACT EXTRACTION      CHOLECYSTECTOMY         Current Outpatient Medications   Medication Sig Dispense Refill    acetaminophen (TYLENOL) 325 mg tablet every 4 (four) hours      acetaminophen (TYLENOL) 325 mg tablet Take 2 tablets (650 mg total) by mouth every 6 (six) hours as needed for mild pain 40 tablet 0    brimonidine tartrate 0 2 % ophthalmic solution brimonidine 0 2 % eye drops   INSTILL 1 DROP INTO AFFECTED EYE(S) BY OPHTHALMIC ROUTE DAILY      lidocaine (LIDODERM) 5 % Apply 1 patch topically daily Remove & Discard patch within 12 hours or as directed by MD 30 patch 0    melatonin 3 mg Daily      Multiple Vitamins-Minerals (MULTIVITAMIN ADULT PO) multivitamin   1 po daily       No current facility-administered medications for this visit          Allergies   Allergen Reactions    Acetazolamide     Codeine      Reaction Date: [id number];     Pollen Extract Other (See Comments)       Review of Systems:  Constitutional: Denies fever, chills  Eyes: Denies eye discharge  ENT: Denies sneezing, nasal discharge, sore throat   Respiratory: Denies cough, shortness of breath  Cardiovascular: Denies chest pain, palpitations, orthopnea, lower extremity swelling  Gastrointestinal: Denies abdominal pain, nausea, vomiting, diarrhea  Genito-Urinary: Denies urinary incontinence  Musculoskeletal: Denies muscle pain  Neurologic: Denies lightheadedness, syncope, headache, seizures  Endocrine: Denies polyuria  Allergy and Immunology: Denies hives  Hematological and Lymphatic: Denies bleeding problems  Psychological: Denies depression, anxiety  Dermatological: Denies rash      Vitals:    06/30/21 1502   BP: 117/75   BP Location: Left arm   Patient Position: Sitting   Cuff Size: Standard   Weight: 56 2 kg (124 lb)   Height: 4' 10" (1 473 m)       Discussion/Summary:  1  BL LE edema  Continue furosemide and potassium as needed  Keep legs elevated at night  Low-salt diet  Use venous compression stockings during daytime     3  HTN, hypertensive heart disease, diastolic dysfunction:  BP stable without need for anti-HTN meds     4  HLD:  Continue diet control  Statin avoided at this age     5  AR/MR/AS/TR:   ECHO Aug 2018 shows EF 60%, no RWMA, estimated peak pressure at least 30 mmHg, mild MR, mild AS, mild AR, mild TR, trace W[place]  Family wants no further testing to be done    Discussed concepts of atherosclerosis, including signs and symptoms of cardiac disease  Previous studies were reviewed  Safety measures were reviewed  Questions were entertained and answered  Patient was advised to report any problem requiring medical attention  Return for follow up visit, if needed  Thank you for allowing me to participate in the care and evaluation of your patient  Should you have any questions, please feel free to contact me        I spent 25 minutes with patient today in which greater than 50% of the time was spent in counseling/coordination of care regarding  no need for BP medications, no need for statin, not much benefit of echocardiogram at this age etc    VIRTUAL VISIT DISCLAIMER    Nicholos Merlin acknowledges that she has consented to an online visit or consultation  She understands that the online visit is based solely on information provided by her, and that, in the absence of a face-to-face physical evaluation by the physician, the diagnosis she receives is both limited and provisional in terms of accuracy and completeness  This is not intended to replace a full medical face-to-face evaluation by the physician   Nicholos Merlin understands and accepts these terms

## 2021-11-09 ENCOUNTER — TELEMEDICINE (OUTPATIENT)
Dept: FAMILY MEDICINE CLINIC | Facility: CLINIC | Age: 86
End: 2021-11-09
Payer: MEDICARE

## 2021-11-09 VITALS
HEIGHT: 58 IN | WEIGHT: 124 LBS | DIASTOLIC BLOOD PRESSURE: 75 MMHG | SYSTOLIC BLOOD PRESSURE: 120 MMHG | BODY MASS INDEX: 26.03 KG/M2

## 2021-11-09 DIAGNOSIS — R29.6 FREQUENT FALLS: ICD-10-CM
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
## 2021-11-09 DIAGNOSIS — Z86.73 HISTORY OF TIA (TRANSIENT ISCHEMIC ATTACK): ICD-10-CM

## 2021-11-09 DIAGNOSIS — R53.1 WEAKNESS OF LEFT SIDE OF BODY: ICD-10-CM

## 2021-11-09 DIAGNOSIS — I63.512 CEREBROVASCULAR ACCIDENT (CVA) DUE TO OCCLUSION OF LEFT MIDDLE CEREBRAL ARTERY (HCC): Primary | ICD-10-CM
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
## 2021-11-09 PROCEDURE — 99214 OFFICE O/P EST MOD 30 MIN: CPT | Performed by: FAMILY MEDICINE

## 2021-11-09 RX ORDER — OFLOXACIN 3 MG/ML
1 SOLUTION/ DROPS OPHTHALMIC 4 TIMES DAILY
COMMUNITY

## 2021-11-23 ENCOUNTER — TELEPHONE (OUTPATIENT)
Dept: FAMILY MEDICINE CLINIC | Facility: CLINIC | Age: 86
End: 2021-11-23

## 2021-11-23 DIAGNOSIS — I63.512 CEREBROVASCULAR ACCIDENT (CVA) DUE TO OCCLUSION OF LEFT MIDDLE CEREBRAL ARTERY (HCC): Primary | ICD-10-CM

## 2021-11-23 DIAGNOSIS — R29.6 FREQUENT FALLS: ICD-10-CM

## 2021-11-24 DIAGNOSIS — Z86.73 HISTORY OF TIA (TRANSIENT ISCHEMIC ATTACK): ICD-10-CM

## 2021-11-24 DIAGNOSIS — R53.81 PHYSICAL DECONDITIONING: ICD-10-CM

## 2021-11-24 DIAGNOSIS — R53.1 WEAKNESS OF LEFT SIDE OF BODY: ICD-10-CM

## 2021-11-24 DIAGNOSIS — I63.512 CEREBROVASCULAR ACCIDENT (CVA) DUE TO OCCLUSION OF LEFT MIDDLE CEREBRAL ARTERY (HCC): Primary | ICD-10-CM

## 2021-11-24 DIAGNOSIS — R29.6 FREQUENT FALLS: ICD-10-CM

## 2021-11-29 ENCOUNTER — TELEPHONE (OUTPATIENT)
Dept: CARDIOLOGY CLINIC | Facility: CLINIC | Age: 86
End: 2021-11-29

## 2021-11-29 NOTE — TELEPHONE ENCOUNTER
S/w daughter in law. Informed her that we can not see her while she is [facility]. Advised she make a hosp f/u appt with Dr Bell upon discharge. Daughter verbalized understanding and will call to make the appt when she is discharged.

## 2021-11-29 NOTE — TELEPHONE ENCOUNTER
Pt's daughter in law Arianne called & stated that pt is currently at [facility] for [facility] & she would like to know if Dr. Bell can look into pt's chart & check on pt.       Arianne can be contacted at [phone]

## 2022-11-10 NOTE — TELEPHONE ENCOUNTER
Patricia from [facility] called to let you now patient will no receive OT next week, they want to wait until Obdulia Tang returns from vacation  Declines